# Patient Record
Sex: FEMALE | Race: WHITE | NOT HISPANIC OR LATINO | Employment: OTHER | URBAN - METROPOLITAN AREA
[De-identification: names, ages, dates, MRNs, and addresses within clinical notes are randomized per-mention and may not be internally consistent; named-entity substitution may affect disease eponyms.]

---

## 2019-05-31 ENCOUNTER — LAB VISIT (OUTPATIENT)
Dept: LAB | Facility: OTHER | Age: 34
End: 2019-05-31
Payer: MEDICARE

## 2019-05-31 ENCOUNTER — OFFICE VISIT (OUTPATIENT)
Dept: NEUROLOGY | Facility: CLINIC | Age: 34
End: 2019-05-31
Payer: MEDICARE

## 2019-05-31 VITALS
HEIGHT: 65 IN | SYSTOLIC BLOOD PRESSURE: 99 MMHG | BODY MASS INDEX: 20.39 KG/M2 | WEIGHT: 122.38 LBS | DIASTOLIC BLOOD PRESSURE: 52 MMHG | HEART RATE: 52 BPM

## 2019-05-31 DIAGNOSIS — G40.109 TEMPORAL LOBE EPILEPSY: ICD-10-CM

## 2019-05-31 DIAGNOSIS — G40.109 TEMPORAL LOBE EPILEPSY: Primary | ICD-10-CM

## 2019-05-31 LAB
ALBUMIN SERPL BCP-MCNC: 4.4 G/DL (ref 3.5–5.2)
ALP SERPL-CCNC: 64 U/L (ref 55–135)
ALT SERPL W/O P-5'-P-CCNC: 14 U/L (ref 10–44)
ANION GAP SERPL CALC-SCNC: 7 MMOL/L (ref 8–16)
AST SERPL-CCNC: 13 U/L (ref 10–40)
BASOPHILS # BLD AUTO: 0.01 K/UL (ref 0–0.2)
BASOPHILS NFR BLD: 0.2 % (ref 0–1.9)
BILIRUB SERPL-MCNC: 0.5 MG/DL (ref 0.1–1)
BUN SERPL-MCNC: 6 MG/DL (ref 6–20)
CALCIUM SERPL-MCNC: 9 MG/DL (ref 8.7–10.5)
CHLORIDE SERPL-SCNC: 106 MMOL/L (ref 95–110)
CO2 SERPL-SCNC: 28 MMOL/L (ref 23–29)
CREAT SERPL-MCNC: 0.8 MG/DL (ref 0.5–1.4)
DIFFERENTIAL METHOD: ABNORMAL
EOSINOPHIL # BLD AUTO: 0 K/UL (ref 0–0.5)
EOSINOPHIL NFR BLD: 0.7 % (ref 0–8)
ERYTHROCYTE [DISTWIDTH] IN BLOOD BY AUTOMATED COUNT: 12.8 % (ref 11.5–14.5)
EST. GFR  (AFRICAN AMERICAN): >60 ML/MIN/1.73 M^2
EST. GFR  (NON AFRICAN AMERICAN): >60 ML/MIN/1.73 M^2
GLUCOSE SERPL-MCNC: 92 MG/DL (ref 70–110)
HCT VFR BLD AUTO: 39.7 % (ref 37–48.5)
HGB BLD-MCNC: 13.6 G/DL (ref 12–16)
LYMPHOCYTES # BLD AUTO: 1.6 K/UL (ref 1–4.8)
LYMPHOCYTES NFR BLD: 27.9 % (ref 18–48)
MCH RBC QN AUTO: 32.2 PG (ref 27–31)
MCHC RBC AUTO-ENTMCNC: 34.3 G/DL (ref 32–36)
MCV RBC AUTO: 94 FL (ref 82–98)
MONOCYTES # BLD AUTO: 0.3 K/UL (ref 0.3–1)
MONOCYTES NFR BLD: 5.4 % (ref 4–15)
NEUTROPHILS # BLD AUTO: 3.8 K/UL (ref 1.8–7.7)
NEUTROPHILS NFR BLD: 65.6 % (ref 38–73)
PLATELET # BLD AUTO: 227 K/UL (ref 150–350)
PMV BLD AUTO: 11.3 FL (ref 9.2–12.9)
POTASSIUM SERPL-SCNC: 3 MMOL/L (ref 3.5–5.1)
PROT SERPL-MCNC: 6.7 G/DL (ref 6–8.4)
RBC # BLD AUTO: 4.23 M/UL (ref 4–5.4)
SODIUM SERPL-SCNC: 141 MMOL/L (ref 136–145)
WBC # BLD AUTO: 5.78 K/UL (ref 3.9–12.7)

## 2019-05-31 PROCEDURE — 99999 PR PBB SHADOW E&M-NEW PATIENT-LVL III: ICD-10-PCS | Mod: PBBFAC,,, | Performed by: PSYCHIATRY & NEUROLOGY

## 2019-05-31 PROCEDURE — 36415 COLL VENOUS BLD VENIPUNCTURE: CPT

## 2019-05-31 PROCEDURE — 3008F BODY MASS INDEX DOCD: CPT | Mod: CPTII,S$GLB,, | Performed by: PSYCHIATRY & NEUROLOGY

## 2019-05-31 PROCEDURE — 85025 COMPLETE CBC W/AUTO DIFF WBC: CPT

## 2019-05-31 PROCEDURE — 3008F PR BODY MASS INDEX (BMI) DOCUMENTED: ICD-10-PCS | Mod: CPTII,S$GLB,, | Performed by: PSYCHIATRY & NEUROLOGY

## 2019-05-31 PROCEDURE — 99205 PR OFFICE/OUTPT VISIT, NEW, LEVL V, 60-74 MIN: ICD-10-PCS | Mod: S$GLB,,, | Performed by: PSYCHIATRY & NEUROLOGY

## 2019-05-31 PROCEDURE — 99999 PR PBB SHADOW E&M-NEW PATIENT-LVL III: CPT | Mod: PBBFAC,,, | Performed by: PSYCHIATRY & NEUROLOGY

## 2019-05-31 PROCEDURE — 99205 OFFICE O/P NEW HI 60 MIN: CPT | Mod: S$GLB,,, | Performed by: PSYCHIATRY & NEUROLOGY

## 2019-05-31 PROCEDURE — 80053 COMPREHEN METABOLIC PANEL: CPT

## 2019-05-31 PROCEDURE — 80177 DRUG SCRN QUAN LEVETIRACETAM: CPT

## 2019-05-31 RX ORDER — LEVETIRACETAM 250 MG/1
500 TABLET ORAL 2 TIMES DAILY
COMMUNITY
End: 2019-05-31 | Stop reason: DRUGHIGH

## 2019-05-31 RX ORDER — LEVETIRACETAM 750 MG/1
1500 TABLET ORAL 2 TIMES DAILY
Qty: 360 TABLET | Refills: 3 | Status: ON HOLD | OUTPATIENT
Start: 2019-05-31 | End: 2019-07-06 | Stop reason: HOSPADM

## 2019-05-31 NOTE — PATIENT INSTRUCTIONS
You came to epilepsy clinic because you are interested in options to treat your seizures including surgery. You have already had an MRI. Please help us get the disk with images so we can look at the images ourselves. You have had an ambultory EEG done recently. The next step is admission into our Epilepsy Monitoring Unit to capture seizures. This will involved stopping your medications. There risks including injury from seizures, seizures that are hard to control, and injury from falls etc. For the mean time, I would like you to take Keppra 1500mg BID to protect from breakthrough seizures while we wait for this admission. No standing water by yourself, no heights, no power tools, bike helmets. Multiple seizures in a row and do not return back to baseline, 911 needs to be called. We will call you to set the EMU admission.       - Copy of the MRI disk   - AMbultory already done

## 2019-05-31 NOTE — LETTER
Roya 3, 2019      Yrn Davey MD  [address] [facility] [place] [address]           [facility] [address]  [place] [address]  Phone: [phone]  Fax: [phone]          Patient: Mary Palma   MR Number: [id number]   YOB: 1985   Date of Visit: 5/31/2019       Dear Dr. Yrn Davey:    Thank you for referring Mary Palma to me for evaluation. Attached you will find relevant portions of my assessment and plan of care.    If you have questions, please do not hesitate to call me. I look forward to following Mary Palma along with you.    Sincerely,    Charmaine Hennessy MD PhD    Enclosure  CC:  No Recipients    If you would like to receive this communication electronically, please contact [email or web address] or [phone] to request more information on [email or web address] Link access.    For providers and/or their staff who would like to refer a patient to [facility], please contact us through our one-stop-shop provider referral line, [facility], at [phone].    If you feel you have received this communication in error or would no longer like to receive these types of communications, please e-mail [email or web address]
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
## 2019-06-03 LAB — LEVETIRACETAM SERPL-MCNC: 24.1 UG/ML (ref 3–60)

## 2019-06-06 ENCOUNTER — TELEPHONE (OUTPATIENT)
Dept: NEUROLOGY | Facility: CLINIC | Age: 34
End: 2019-06-06

## 2019-06-11 ENCOUNTER — TELEPHONE (OUTPATIENT)
Dept: NEUROLOGY | Facility: CLINIC | Age: 34
End: 2019-06-11

## 2019-06-26 ENCOUNTER — TELEPHONE (OUTPATIENT)
Dept: NEUROLOGY | Facility: CLINIC | Age: 34
End: 2019-06-26

## 2019-07-02 ENCOUNTER — PATIENT MESSAGE (OUTPATIENT)
Dept: NEUROLOGY | Facility: CLINIC | Age: 34
End: 2019-07-02

## 2019-07-03 ENCOUNTER — HOSPITAL ENCOUNTER (INPATIENT)
Facility: HOSPITAL | Age: 34
LOS: 3 days | Discharge: HOME | DRG: 101 | End: 2019-07-06
Attending: PSYCHIATRY & NEUROLOGY | Admitting: PSYCHIATRY & NEUROLOGY
Payer: MEDICARE

## 2019-07-03 DIAGNOSIS — G40.219 COMPLEX PARTIAL EPILEPSY WITH GENERALIZATION AND WITH INTRACTABLE EPILEPSY: ICD-10-CM

## 2019-07-03 DIAGNOSIS — G40.109 TEMPORAL LOBE EPILEPSY: ICD-10-CM

## 2019-07-03 DIAGNOSIS — R56.9 SEIZURES: Primary | ICD-10-CM

## 2019-07-03 LAB
ALBUMIN SERPL BCP-MCNC: 4.1 G/DL (ref 3.5–5.2)
ALP SERPL-CCNC: 65 U/L (ref 55–135)
ALT SERPL W/O P-5'-P-CCNC: 11 U/L (ref 10–44)
ANION GAP SERPL CALC-SCNC: 7 MMOL/L (ref 8–16)
AST SERPL-CCNC: 12 U/L (ref 10–40)
BASOPHILS # BLD AUTO: 0.03 K/UL (ref 0–0.2)
BASOPHILS NFR BLD: 0.5 % (ref 0–1.9)
BILIRUB SERPL-MCNC: 0.6 MG/DL (ref 0.1–1)
BUN SERPL-MCNC: 6 MG/DL (ref 6–20)
CALCIUM SERPL-MCNC: 9 MG/DL (ref 8.7–10.5)
CHLORIDE SERPL-SCNC: 105 MMOL/L (ref 95–110)
CO2 SERPL-SCNC: 27 MMOL/L (ref 23–29)
CREAT SERPL-MCNC: 0.8 MG/DL (ref 0.5–1.4)
DIFFERENTIAL METHOD: ABNORMAL
EOSINOPHIL # BLD AUTO: 0.1 K/UL (ref 0–0.5)
EOSINOPHIL NFR BLD: 1.7 % (ref 0–8)
ERYTHROCYTE [DISTWIDTH] IN BLOOD BY AUTOMATED COUNT: 12.2 % (ref 11.5–14.5)
EST. GFR  (AFRICAN AMERICAN): >60 ML/MIN/1.73 M^2
EST. GFR  (NON AFRICAN AMERICAN): >60 ML/MIN/1.73 M^2
GLUCOSE SERPL-MCNC: 142 MG/DL (ref 70–110)
HCT VFR BLD AUTO: 38.8 % (ref 37–48.5)
HGB BLD-MCNC: 13.5 G/DL (ref 12–16)
IMM GRANULOCYTES # BLD AUTO: 0.02 K/UL (ref 0–0.04)
IMM GRANULOCYTES NFR BLD AUTO: 0.3 % (ref 0–0.5)
LYMPHOCYTES # BLD AUTO: 2.7 K/UL (ref 1–4.8)
LYMPHOCYTES NFR BLD: 42.8 % (ref 18–48)
MCH RBC QN AUTO: 32.6 PG (ref 27–31)
MCHC RBC AUTO-ENTMCNC: 34.8 G/DL (ref 32–36)
MCV RBC AUTO: 94 FL (ref 82–98)
MONOCYTES # BLD AUTO: 0.4 K/UL (ref 0.3–1)
MONOCYTES NFR BLD: 6.6 % (ref 4–15)
NEUTROPHILS # BLD AUTO: 3.1 K/UL (ref 1.8–7.7)
NEUTROPHILS NFR BLD: 48.1 % (ref 38–73)
NRBC BLD-RTO: 0 /100 WBC
PLATELET # BLD AUTO: 171 K/UL (ref 150–350)
PMV BLD AUTO: 10.9 FL (ref 9.2–12.9)
POTASSIUM SERPL-SCNC: 3.1 MMOL/L (ref 3.5–5.1)
PROT SERPL-MCNC: 6.4 G/DL (ref 6–8.4)
RBC # BLD AUTO: 4.14 M/UL (ref 4–5.4)
SODIUM SERPL-SCNC: 139 MMOL/L (ref 136–145)
WBC # BLD AUTO: 6.36 K/UL (ref 3.9–12.7)

## 2019-07-03 PROCEDURE — 80177 DRUG SCRN QUAN LEVETIRACETAM: CPT

## 2019-07-03 PROCEDURE — 95951 HC EEG MONITORING/VIDEO RECORD: CPT

## 2019-07-03 PROCEDURE — 80299 QUANTITATIVE ASSAY DRUG: CPT

## 2019-07-03 PROCEDURE — 99223 1ST HOSP IP/OBS HIGH 75: CPT | Mod: AI,,, | Performed by: PSYCHIATRY & NEUROLOGY

## 2019-07-03 PROCEDURE — 95951 PR EEG MONITORING/VIDEORECORD: ICD-10-PCS | Mod: 26,,, | Performed by: PSYCHIATRY & NEUROLOGY

## 2019-07-03 PROCEDURE — 80053 COMPREHEN METABOLIC PANEL: CPT

## 2019-07-03 PROCEDURE — 95951 PR EEG MONITORING/VIDEORECORD: CPT | Mod: 26,,, | Performed by: PSYCHIATRY & NEUROLOGY

## 2019-07-03 PROCEDURE — 63600175 PHARM REV CODE 636 W HCPCS: Performed by: PSYCHIATRY & NEUROLOGY

## 2019-07-03 PROCEDURE — 99223 PR INITIAL HOSPITAL CARE,LEVL III: ICD-10-PCS | Mod: AI,,, | Performed by: PSYCHIATRY & NEUROLOGY

## 2019-07-03 PROCEDURE — 85025 COMPLETE CBC W/AUTO DIFF WBC: CPT

## 2019-07-03 PROCEDURE — 25000003 PHARM REV CODE 250: Performed by: PSYCHIATRY & NEUROLOGY

## 2019-07-03 PROCEDURE — 20600001 HC STEP DOWN PRIVATE ROOM

## 2019-07-03 RX ORDER — LEVETIRACETAM 750 MG/1
1500 TABLET ORAL 2 TIMES DAILY
Status: DISCONTINUED | OUTPATIENT
Start: 2019-07-03 | End: 2019-07-05

## 2019-07-03 RX ORDER — DOCUSATE SODIUM 100 MG/1
100 CAPSULE, LIQUID FILLED ORAL 2 TIMES DAILY
Status: DISCONTINUED | OUTPATIENT
Start: 2019-07-03 | End: 2019-07-06 | Stop reason: HOSPADM

## 2019-07-03 RX ORDER — ACETAMINOPHEN 325 MG/1
650 TABLET ORAL EVERY 4 HOURS PRN
Status: DISCONTINUED | OUTPATIENT
Start: 2019-07-03 | End: 2019-07-06 | Stop reason: HOSPADM

## 2019-07-03 RX ORDER — ONDANSETRON 2 MG/ML
4 INJECTION INTRAMUSCULAR; INTRAVENOUS EVERY 8 HOURS PRN
Status: DISCONTINUED | OUTPATIENT
Start: 2019-07-03 | End: 2019-07-06 | Stop reason: HOSPADM

## 2019-07-03 RX ORDER — LEVETIRACETAM 750 MG/1
1500 TABLET ORAL
Status: ON HOLD | COMMUNITY
End: 2019-07-03

## 2019-07-03 RX ORDER — SODIUM CHLORIDE 0.9 % (FLUSH) 0.9 %
10 SYRINGE (ML) INJECTION
Status: DISCONTINUED | OUTPATIENT
Start: 2019-07-03 | End: 2019-07-06 | Stop reason: HOSPADM

## 2019-07-03 RX ORDER — LORAZEPAM 2 MG/ML
2 INJECTION INTRAMUSCULAR DAILY PRN
Status: DISCONTINUED | OUTPATIENT
Start: 2019-07-03 | End: 2019-07-06 | Stop reason: HOSPADM

## 2019-07-03 RX ADMIN — LORAZEPAM 2 MG: 2 INJECTION, SOLUTION INTRAMUSCULAR; INTRAVENOUS at 06:07

## 2019-07-03 RX ADMIN — LEVETIRACETAM 1500 MG: 750 TABLET ORAL at 10:07
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
## 2019-07-03 NOTE — ASSESSMENT & PLAN NOTE
34F w/ PMH reported focal onset epilepsy with multiple event types in house for event characterization    The patient's AED regimen is being held/reduced/changed and/or the patient is undergoing provocative interventions in an effort to capture clinical events, making inpatient admission medically necessary for patient safety.  This patient is felt to be high risk due to the likelihood of seizures during this admission.       - holding home keppra  - ativan PRN prolonged seizure or seizure cluster  -- start vEEG  -- seizure precautions  -- may require neuropsych involvement
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
## 2019-07-03 NOTE — PROCEDURES
EMU Monitoring  Date/Time: 7/3/2019 2:14 PM  Performed by: Power Collier MD  Authorized by: Power Collier MD         VIDEO ELECTROENCEPHALOGRAM  REPORT    DATE OF SERVICE: 7/3/19-7/6/19  EEG NUMBER: -1,2,3,4  REQUESTED BY:  Gerhard  LOCATION OF SERVICE:  [facility] [place]    METHODOLOGY   Electroencephalographic (EEG) recording is with electrodes placed according to the International 10-20 placement system.  Thirty two (32) channels of digital signal (sampling rate of 512/sec) including T1 and T2 was simultaneously recorded from the scalp and may include  EKG, EMG, and/or eye monitors.  Recording band pass was 0.1 to 512 hz.  Digital video recording of the patient is simultaneously recorded with the EEG.  The patient is instructed report clinical symptoms which may occur during the recording session.  EEG and video recording is stored and archived in digital format.  Activation procedures which include photic stimulation, hyperventilation and instructing patients to perform simple task are done in selected patients.   The EEG is displayed on a monitor screen and can be reviewed using different montages.  Computer assisted analysis is employed to detect spike and electrographic seizure activity.   The entire record is submitted for computer analysis.  The entire recording is visually reviewed and the times identified by computer analysis as being spikes or seizures are reviewed again.  Compresses spectral analysis (CSA) is also performed on the activity recorded from each individual channel.  This is displayed as a power display of frequencies from 0 to 30 Hz over time.   The CSA is reviewed looking for asymmetries in power between homologous areas of the scalp and then compared with the original EEG recording.     Vimodi software was also utilized in the review of this study.  This software suite analyzes the EEG recording in multiple domains.  Coherence and rhythmicity is computed to identify EEG sections which may  contain organized seizures.  Each channel undergoes analysis to detect presence of spike and sharp waves which have special and morphological characteristic of epileptic activity.  The routine EEG recording is converted from spacial into frequency domain.  This is then displayed comparing homologous areas to identify areas of significant asymmetry.  Algorithm to identify non-cortically generated artifact is used to separate eye movement, EMG and other artifact from the EEG.      RECORDING TIMES  Start on 7/3/19 at 15:01:01   Stop on 7/6/19 at 11:41:29  A total of 68 hrs and 40 min of EEG recording was obtained.    ELECTROENCEPHALOGRAM:    INTERICTAL:   The record shows a good  organization at rest, consisting of a  10.5 Hz posterior dominant rhythm with good  reactivity. There is mild bilateral beta activity. There are abundant ultimately becoming frequent, bilateral, independent sharp wave and spike and wave interictal epileptiform discharges seen maximally at F8/T2, F7/T1 and T3. There is intermittent theta/delta slowing seen     Drowsiness is characterized by attenuation of the background, vertex waves, and bilateral theta slowing. Stage II sleep is characterized by slowing, vertex waves, and symmetric sleep spindles. Slow wave and REM sleep are recorded.    Provocative maneuvers including hyperventilation and photic stimulation were performed.     EKG recording shows a sinus rhythm.      ICTAL:   Seizure 1: At 15:15:19 until 15:16:42 on 7/3/19 . Clinically, the patient exhibits hypermotor activity rapidly changing position in bed. She does not follow commands and pulls away from the EEG tech and pulls on the wires before ultimately attending to her father. Electrographically, there is onset of rhythmic, 5 Hz theta activity over the left frontotemporal region with rapid spread bilaterally to the right before rapidly being obscured by electrode and myogenic artifact.    Seizure 2: At 16:28:00 until 16:29:19 on  7/3/19. Clinically, the patient is lying in bed when she opens her eyes and fumbles her right hand before exhibiting hypermotor activity changing position in bed and then fumbling her left hand prior to electrographic end of the seizure. Electrographically, seizure is similar to seizure 1 with onset of 5 Hz sharply contoured rhythmic theta activity over the left frontotemproal region maximal at T1/T3 again with spread to the right and again obscured by electrode and myogenic artifact.     Seizure 3: At 17:34:37 until 17:36:00 on 7/3/19 Clinically, the seizure is similar to seizures 1 and 2 with hypermotor activity, unresponsiveness, fumbling of the right hand, and the patient pulling on the bedsheets. This time, the patient exhibits subtle side to side head shaking later followed by subtle up and down head shaking. Electrographically, seizure is similar to Seizures 1 and 2.     Seizure 4: At 20:14:42 until 20:51:31 on 7/3/19, the patient is asleep in bed. Clinically, the patient exibits no behavior change until 20:15:06, when she shifts position in bed before briefly exhibiting hypermotor activity and moving her head side to side. There is very clear onset of high amplitude, sharply contoured, rhythmic, 5 Hz activity over the left frontotemporal region maximal at T1/T3 with spread to the right hemisphere. At 20:14:49, there is onset of independent high amplitude, rhythmic, sharply contoured, 6 Hz activity maximal over the right hemisphere that is rapidly obscured by artifact.     Seizure 5: At 21:11:50 until 21:12:38 on 7/3/19, the patient is asleep on her left side in bed before raising her right leg and exhibiting hypermotor activity at 21:12:18 until clinical end of the seizure. Electrographically, seizure is similar to seizure 5 with onset of rhythmic 5 Hz activity over the left hemisphere followed by rhythmic 6  Hz activity over the right hemisphere and evolution similar to previous seizures.      Seizure 6: At  21:38:55 until 21:39:51 on 7/3/19, the patient ss asleep in bed lying on her left side. She exhibits a slight positional change at 21:39:19 followed by hypermotor activity at 21:39:29 with subsequent head nodding and nonsensical speech at the end of the seizure. The seizure begins with sharply contoured, rhythmic 6 Hz activity, this time clearly beginning over the right hemisphere maximal at T2/T4. This is followed within 1 second by 4-5 Hz, sharply contoured rhythmic activity over the left temporal region with seizure rapidly obscured by myogenic and electrode artifact.    Seizure 7: At 22:46:55 until 22:47:23 on 7/3/19. Seizure is electrographically and clinically similar to seizure 4.     Seizure 8: At 00:13:28 until 00:14:19 on 7/4/19. Seizure is electrographically and clinically similar to seizure 4.     Seizure 9: At 01:47:41 until 01:48:56 on 7/4/19. Seizure is electrographically and clinically similar to seizure 4.     Seizure 10: At 02:19:21 until 02:21:10 on 7/4/19. Seizure is electrographically and clinically similar to seizure 4.     Seizure 11: At 03:41:05 until 03:42:05 on 7/4/19. Seizure is electrographically and clinically similar to seizure 4.     Seizure 12: At 08:52:52 until 09:10:06 on 7/4/19. Seizure is electrographically and clinically similar to seizure 4.     Seizure 13: At 06:39:06 until 06:39:31 on 7/6/19. Seizure is electrographically and clinically similar to seizure 4.    IMPRESSION:  Markedly abnormal study consistent with bitemporal, independent focal onset epilepsy. Patient has abundant bilateral, independent interictal epileptiform discharges. A total of 13 electroclinical seizures are captured, 12 with left hemispheric onset and 1 with right hemispheric onset.     Power Collier MD

## 2019-07-03 NOTE — SUBJECTIVE & OBJECTIVE
Past Medical History:   Diagnosis Date    Anxiety     Depression     PTSD (post-traumatic stress disorder)        Past Surgical History:   Procedure Laterality Date    HYSTERECTOMY         Review of patient's allergies indicates:  No Known Allergies    No current facility-administered medications on file prior to encounter.      Current Outpatient Medications on File Prior to Encounter   Medication Sig    [DISCONTINUED] eslicarbazepine (APTIOM) 800 mg Tab Take 1,600 mg by mouth.    levETIRAcetam (KEPPRA) 750 MG Tab Take 2 tablets (1,500 mg total) by mouth 2 (two) times daily.    [DISCONTINUED] levETIRAcetam (KEPPRA) 750 MG Tab Take 1,500 mg by mouth.     Continuous Infusions:    Family History     None        Tobacco Use    Smoking status: Current Every Day Smoker   Substance and Sexual Activity    Alcohol use: Yes     Frequency: Monthly or less    Drug use: Yes     Types: Marijuana    Sexual activity: Not on file     Review of Systems  Objective:     Vital Signs (Most Recent):  Temp: 96.9 °F (36.1 °C) (07/03/19 1335)  Pulse: (!) 54 (07/03/19 1335)  Resp: 18 (07/03/19 1335)  BP: (!) 95/50 (07/03/19 1335)  SpO2: 98 % (07/03/19 1335) Vital Signs (24h Range):  Temp:  [96.9 °F (36.1 °C)] 96.9 °F (36.1 °C)  Pulse:  [54] 54  Resp:  [18] 18  SpO2:  [98 %] 98 %  BP: (95)/(50) 95/50        There is no height or weight on file to calculate BMI.    Physical Exam  Vitals:    07/03/19 1335   BP: (!) 95/50   Patient Position: Sitting   Pulse: (!) 54   Resp: 18   Temp: 96.9 °F (36.1 °C)   TempSrc: Oral   SpO2: 98%       General: NAD, well nourished   Eyes: no tearing, discharge, no erythema   ENT: moist mucous membranes of the oral cavity, nares patent    Neck: Supple, full range of motion  Cardiovascular: Warm and well perfused, pulses equal and symmetrical  Lungs: Normal work of breathing, normal chest wall excursions  Skin: No rash, lesions, or breakdown on exposed skin  Psychiatry: Mood and affect are appropriate    Abdomen: soft, non tender, non distended  Extremeties: No cyanosis, clubbing or edema.    Neurological   MENTAL STATUS: Alert and oriented to person, place, and time. Attention and concentration within normal limits. Speech without dysarthria. Recent and remote memory within normal limits   CRANIAL NERVES: Visual fields intact. PERRL. EOMI. Facial sensation intact. Face symmetrical. Hearing grossly intact. Full shoulder shrug bilaterally. Tongue protrudes midline   SENSORY: Sensation is intact to light touch throughout.    MOTOR: Normal bulk and tone.  5/5 deltoid, biceps, triceps, interosseous, hand  bilaterally. 5/5 iliopsoas, knee extension/flexion, foot dorsi/plantarflexion bilaterally.    REFLEXES: Symmetric and 2+ throughout. CEREBELLAR/COORDINATION/GAIT: Gait steady with normal arm swing and stride length. Finger to nose intact. Normal rapid alternating movements.     Significant Labs: All pertinent lab results from the past 24 hours have been reviewed.    Significant Studies: CT: I have reviewed all pertinent results/findings within the past 24 hours

## 2019-07-04 PROCEDURE — 95951 PR EEG MONITORING/VIDEORECORD: ICD-10-PCS | Mod: 26,,, | Performed by: PSYCHIATRY & NEUROLOGY

## 2019-07-04 PROCEDURE — 95951 PR EEG MONITORING/VIDEORECORD: CPT | Mod: 26,,, | Performed by: PSYCHIATRY & NEUROLOGY

## 2019-07-04 PROCEDURE — 95951 HC EEG MONITORING/VIDEO RECORD: CPT

## 2019-07-04 PROCEDURE — 25000003 PHARM REV CODE 250: Performed by: PSYCHIATRY & NEUROLOGY

## 2019-07-04 PROCEDURE — 99233 PR SUBSEQUENT HOSPITAL CARE,LEVL III: ICD-10-PCS | Mod: ,,, | Performed by: PSYCHIATRY & NEUROLOGY

## 2019-07-04 PROCEDURE — 20600001 HC STEP DOWN PRIVATE ROOM

## 2019-07-04 PROCEDURE — 99233 SBSQ HOSP IP/OBS HIGH 50: CPT | Mod: ,,, | Performed by: PSYCHIATRY & NEUROLOGY

## 2019-07-04 RX ORDER — LAMOTRIGINE 25 MG/1
25 TABLET ORAL DAILY
Status: DISCONTINUED | OUTPATIENT
Start: 2019-07-04 | End: 2019-07-06 | Stop reason: HOSPADM

## 2019-07-04 RX ORDER — VALPROIC ACID 250 MG/1
250 CAPSULE, LIQUID FILLED ORAL 2 TIMES DAILY
Status: DISCONTINUED | OUTPATIENT
Start: 2019-07-04 | End: 2019-07-06 | Stop reason: HOSPADM

## 2019-07-04 RX ADMIN — LEVETIRACETAM 1500 MG: 750 TABLET ORAL at 10:07

## 2019-07-04 RX ADMIN — LAMOTRIGINE 25 MG: 25 TABLET ORAL at 01:07

## 2019-07-04 RX ADMIN — DEXTROSE 1000 MG: 50 INJECTION, SOLUTION INTRAVENOUS at 01:07

## 2019-07-04 RX ADMIN — VALPROIC ACID 250 MG: 250 CAPSULE, LIQUID FILLED ORAL at 10:07

## 2019-07-04 NOTE — NURSING
Patient with one event early am.  Physician present at bedside.  VSSm patient returned to baseline approx 30 minutes past.   Medication reviewed and new orders received.   Will cont   POC and monitor seizures.

## 2019-07-04 NOTE — HOSPITAL COURSE
7/3-7/4: 10 sz w/ L hemipheric onset, 1 with R hemispheric onset, bitemporal independent interictals  7/4 -7/5: 2 sz w/ L hemispheric onset  7/5-7/6: 1 sz 1/w/ L hemispheric onset     Patient with significant reduction in seizure burden and interictals discharged home to follow up with Dr. Hennessy. Tolerating new regimen of depakote transitioning to lamictal with onfi well. Will provide ativan taper following discharge.

## 2019-07-04 NOTE — ASSESSMENT & PLAN NOTE
34F w/ PMH reported focal onset epilepsy with multiple event types in house for event characterization    The patient's AED regimen is being held/reduced/changed and/or the patient is undergoing provocative interventions in an effort to capture clinical events, making inpatient admission medically necessary for patient safety.  This patient is felt to be high risk due to the likelihood of seizures during this admission.       -- dc'd keppra, not providing adequate seizure control at max doses  -- depakote 250 mg BID with lamictal 25 mg daily, will move forward with combination therapy while lamictal being increased  -- onfi 10 mg daily as adjuvant  -- ativan taper at discharge  - ativan PRN prolonged seizure or seizure cluster  -- discontinue vEEG  -- seizure precautions  -- may require neuropsych involvement as outpatient

## 2019-07-04 NOTE — PROGRESS NOTES
[facility]  Neurology-Epilepsy  Progress Note    Patient Name: Mary Haywood  MRN: [id number]  Admission Date: 7/3/2019  Hospital Length of Stay: 1 days  Code Status: Full Code   Attending Provider: Power Collier MD  Primary Care Physician: Primary Doctor No   Principal Problem:Complex partial epilepsy with generalization and with intractable epilepsy    Subjective:     Interval History: Multiple electroclinical seizures overnight. Many with hypermotor activity, one with postictal psychosis. One seizure this AM while in room with examiner. Able to visually attend to father but unable to follow commands during hypermotor event.     Hospital Course: 7/3-7/4: Held home keppra on admit. 10 sz w/ L hemipheric onset, 1 with R hemispheric onset, bitemporal independent interictals      Current Facility-Administered Medications   Medication Dose Route Frequency Provider Last Rate Last Dose    acetaminophen tablet 650 mg  650 mg Oral Q4H PRN Power Collier MD        docusate sodium capsule 100 mg  100 mg Oral BID Power Collier MD        lamoTRIgine tablet 25 mg  25 mg Oral Daily Power Collier MD        levETIRAcetam tablet 1,500 mg  1,500 mg Oral BID Power Collier MD   1,500 mg at 07/04/19 1003    lorazepam injection 2 mg  2 mg Intravenous Daily PRN Power Collier MD   2 mg at 07/03/19 1803    ondansetron injection 4 mg  4 mg Intravenous Q8H PRN Power Collier MD        sodium chloride 0.9% flush 10 mL  10 mL Intravenous PRN Power Collier MD        valproate (DEPACON) 1,000 mg in dextrose 5 % 100 mL IVPB  1,000 mg Intravenous Once Power Collier MD        Followed by    valproic acid capsule 250 mg  250 mg Oral BID Power Collier MD         Continuous Infusions:    Review of Systems  Objective:     Vital Signs (Most Recent):  Temp: 97.4 °F (36.3 °C) (07/04/19 0807)  Pulse: 99 (07/04/19 0807)  Resp: 18 (07/04/19 0807)  BP: 102/68 (07/04/19 0807)  SpO2: 98 % (07/04/19 0807) Vital  Signs (24h Range):  Temp:  [96.9 °F (36.1 °C)-99.4 °F (37.4 °C)] 97.4 °F (36.3 °C)  Pulse:  [48-99] 99  Resp:  [17-18] 18  SpO2:  [96 %-98 %] 98 %  BP: ()/(50-68) 102/68     Weight: 56.7 kg (125 lb)  Body mass index is 20.8 kg/m².    Physical Exam  Vitals:    07/04/19 0300 07/04/19 0412 07/04/19 0723 07/04/19 0807   BP:  101/63  102/68   BP Location:  Right arm     Patient Position:  Lying     Pulse: (!) 51 (!) 51 (!) 51 99   Resp:  18  18   Temp:  99.4 °F (37.4 °C)  97.4 °F (36.3 °C)   TempSrc:  Oral     SpO2:  97%  98%   Weight:       Height:           General: NAD, well nourished   Eyes: no tearing, discharge, no erythema   ENT: moist mucous membranes of the oral cavity, nares patent    Neck: Supple, full range of motion  Cardiovascular: Warm and well perfused, pulses equal and symmetrical  Lungs: Normal work of breathing, normal chest wall excursions  Skin: No rash, lesions, or breakdown on exposed skin  Psychiatry: Mood and affect are appropriate   Abdomen: soft, non tender, non distended  Extremeties: No cyanosis, clubbing or edema.    Neurological   MENTAL STATUS: Alert and oriented to person, place, and time. Attention and concentration within normal limits. Speech without dysarthria, able to name and repeat without difficulty. Recent and remote memory within normal limits   CRANIAL NERVES: Visual fields intact. PERRL. EOMI. Facial sensation intact. Face symmetrical. Hearing grossly intact. Full shoulder shrug bilaterally. Tongue protrudes midline   SENSORY: Sensation is intact to light touch throughout.    MOTOR: Normal bulk and tone.  5/5 deltoid, biceps, triceps, interosseous, hand  bilaterally. 5/5 iliopsoas, knee extension/flexion, foot dorsi/plantarflexion bilaterally.    REFLEXES: Symmetric and 2+ throughout.  CEREBELLAR/COORDINATION/GAIT: Gait steady with normal arm swing and stride length.  Heel to shin intact.. Normal rapid alternating movements.        Significant Labs: All pertinent lab  results from the past 24 hours have been reviewed.    Significant Studies: I have reviewed all pertinent imaging results/findings within the past 24 hours.    Assessment and Plan:   * Complex partial epilepsy with generalization and with intractable epilepsy  34F w/ PMH reported focal onset epilepsy with multiple event types in house for event characterization    The patient's AED regimen is being held/reduced/changed and/or the patient is undergoing provocative interventions in an effort to capture clinical events, making inpatient admission medically necessary for patient safety.  This patient is felt to be high risk due to the likelihood of seizures during this admission.       - resume home keppra, likely dc once therapeutic on new AEDs  -- loaded depakote, start depakote 250 mg BID with lamictal 25 mg daily, will move forward with combination therapy while lamictal being increased  - ativan PRN prolonged seizure or seizure cluster  -- continue vEEG  -- seizure precautions  -- may require neuropsych involvement      Active Diagnoses:    Diagnosis Date Noted POA    PRINCIPAL PROBLEM:  Complex partial epilepsy with generalization and with intractable epilepsy [G40.219] 07/03/2019 Yes    Seizures [R56.9]  Unknown      Problems Resolved During this Admission:       VTE Risk Mitigation (From admission, onward)        Ordered     IP VTE LOW RISK PATIENT  Once      07/03/19 1247     Place sequential compression device  Until discontinued      07/03/19 1247          Power Collier MD  Neurology-Epilepsy  [facility]

## 2019-07-05 ENCOUNTER — PATIENT MESSAGE (OUTPATIENT)
Dept: NEUROLOGY | Facility: CLINIC | Age: 34
End: 2019-07-05

## 2019-07-05 ENCOUNTER — TELEPHONE (OUTPATIENT)
Dept: NEUROLOGY | Facility: CLINIC | Age: 34
End: 2019-07-05

## 2019-07-05 ENCOUNTER — TELEPHONE (OUTPATIENT)
Dept: PHARMACY | Facility: CLINIC | Age: 34
End: 2019-07-05

## 2019-07-05 PROBLEM — F41.9 ANXIETY AND DEPRESSION: Status: ACTIVE | Noted: 2019-07-05

## 2019-07-05 PROBLEM — F43.10 PTSD (POST-TRAUMATIC STRESS DISORDER): Status: ACTIVE | Noted: 2019-07-05

## 2019-07-05 PROBLEM — F32.A ANXIETY AND DEPRESSION: Status: ACTIVE | Noted: 2019-07-05

## 2019-07-05 PROCEDURE — 99233 PR SUBSEQUENT HOSPITAL CARE,LEVL III: ICD-10-PCS | Mod: ,,, | Performed by: PSYCHIATRY & NEUROLOGY

## 2019-07-05 PROCEDURE — 95951 PR EEG MONITORING/VIDEORECORD: ICD-10-PCS | Mod: 26,,, | Performed by: PSYCHIATRY & NEUROLOGY

## 2019-07-05 PROCEDURE — 99233 SBSQ HOSP IP/OBS HIGH 50: CPT | Mod: ,,, | Performed by: PSYCHIATRY & NEUROLOGY

## 2019-07-05 PROCEDURE — 20600001 HC STEP DOWN PRIVATE ROOM

## 2019-07-05 PROCEDURE — 95951 PR EEG MONITORING/VIDEORECORD: CPT | Mod: 26,,, | Performed by: PSYCHIATRY & NEUROLOGY

## 2019-07-05 PROCEDURE — 95951 HC EEG MONITORING/VIDEO RECORD: CPT

## 2019-07-05 PROCEDURE — 25000003 PHARM REV CODE 250: Performed by: PSYCHIATRY & NEUROLOGY

## 2019-07-05 RX ORDER — CLOBAZAM 10 MG/1
10 TABLET ORAL DAILY
Status: DISCONTINUED | OUTPATIENT
Start: 2019-07-05 | End: 2019-07-06 | Stop reason: HOSPADM

## 2019-07-05 RX ADMIN — VALPROIC ACID 250 MG: 250 CAPSULE, LIQUID FILLED ORAL at 10:07

## 2019-07-05 RX ADMIN — CLOBAZAM 10 MG: 10 TABLET ORAL at 10:07

## 2019-07-05 RX ADMIN — LEVETIRACETAM 1500 MG: 750 TABLET ORAL at 08:07

## 2019-07-05 RX ADMIN — LAMOTRIGINE 25 MG: 25 TABLET ORAL at 08:07

## 2019-07-05 RX ADMIN — VALPROIC ACID 250 MG: 250 CAPSULE, LIQUID FILLED ORAL at 08:07

## 2019-07-05 NOTE — SUBJECTIVE & OBJECTIVE
Past Medical History:   Diagnosis Date    Anxiety     Depression     PTSD (post-traumatic stress disorder)        Past Surgical History:   Procedure Laterality Date    HYSTERECTOMY         Review of patient's allergies indicates:  No Known Allergies    No current facility-administered medications on file prior to encounter.      Current Outpatient Medications on File Prior to Encounter   Medication Sig    levETIRAcetam (KEPPRA) 750 MG Tab Take 2 tablets (1,500 mg total) by mouth 2 (two) times daily.     Continuous Infusions:    Family History     None        Tobacco Use    Smoking status: Current Every Day Smoker   Substance and Sexual Activity    Alcohol use: Yes     Frequency: Monthly or less    Drug use: Yes     Types: Marijuana    Sexual activity: Not on file     Review of Systems  Objective:     Vital Signs (Most Recent):  Temp: 97.8 °F (36.6 °C) (07/05/19 1122)  Pulse: 61 (07/05/19 1122)  Resp: 14 (07/05/19 1122)  BP: (!) 95/56 (07/05/19 1122)  SpO2: 98 % (07/05/19 1122) Vital Signs (24h Range):  Temp:  [97.7 °F (36.5 °C)-98.5 °F (36.9 °C)] 97.8 °F (36.6 °C)  Pulse:  [49-68] 61  Resp:  [1-18] 14  SpO2:  [96 %-99 %] 98 %  BP: ()/(46-66) 95/56     Weight: 56.7 kg (125 lb)  Body mass index is 20.8 kg/m².    Physical Exam       Vitals:    07/05/19 0455 07/05/19 0501 07/05/19 0723 07/05/19 1122   BP: (!) 91/54 92/61 (!) 93/51 (!) 95/56   BP Location:   Right arm Right arm   Patient Position:   Lying Lying   Pulse:   (!) 58 61   Resp:   14 14   Temp:   97.8 °F (36.6 °C) 97.8 °F (36.6 °C)   TempSrc:   Oral Oral   SpO2:   97% 98%   Weight:       Height:           General: NAD, well nourished   Eyes: no tearing, discharge, no erythema   ENT: moist mucous membranes of the oral cavity, nares patent    Neck: Supple, full range of motion  Cardiovascular: Warm and well perfused, pulses equal and symmetrical  Lungs: Normal work of breathing, normal chest wall excursions  Skin: No rash, lesions, or breakdown on  exposed skin  Psychiatry: Mood and affect are appropriate   Abdomen: soft, non tender, non distended  Extremeties: No cyanosis, clubbing or edema.    Neurological   MENTAL STATUS: Alert and oriented to person, place, and time. Attention and concentration within normal limits. Speech without dysarthria Recent and remote memory within normal limits   CRANIAL NERVES: Visual fields intact. PERRL. EOMI. Facial sensation intact. Face symmetrical. Hearing grossly intact. Full shoulder shrug bilaterally. Tongue protrudes midline   SENSORY: Sensation is intact to light touch throughout.    MOTOR: Normal bulk and tone.   5/5 deltoid, biceps, triceps, interosseous, hand  bilaterally. 5/5 iliopsoas, knee extension/flexion, foot dorsi/plantarflexion bilaterally.    REFLEXES: Symmetric and 2+ throughout.   CEREBELLAR/COORDINATION/GAIT:Finger to nose intact. Normal rapid alternating movements.     Significant Labs: All pertinent lab results from the past 24 hours have been reviewed.    Significant Studies: I have reviewed all pertinent imaging results/findings within the past 24 hours.

## 2019-07-05 NOTE — PROGRESS NOTES
[facility]  Neurology-Epilepsy  Progress Note    Patient Name: Mary Haywood  MRN: [id number]  Admission Date: 7/3/2019  Hospital Length of Stay: 2 days  Code Status: Full Code   Attending Provider: Power Collier MD  Primary Care Physician: Primary Doctor No   Principal Problem:Complex partial epilepsy with generalization and with intractable epilepsy    Subjective:     Interval History: NAEON. Patient tolerated depakote load without event.     Hospital Course: 7/3-7/4: 10 sz w/ L hemipheric onset, 1 with R hemispheric onset, bitemporal independent interictals  7/4 -7/5: 2 sz w/ L hemispheric onset, loaded depakote with lamictal and onfi, with plan to transition off depakote once lamictal therapeutic         Current Facility-Administered Medications   Medication Dose Route Frequency Provider Last Rate Last Dose    acetaminophen tablet 650 mg  650 mg Oral Q4H PRN Power Collier MD        cloBAZam Tab 10 mg  10 mg Oral Daily Power Collier MD   10 mg at 07/05/19 1059    docusate sodium capsule 100 mg  100 mg Oral BID Power Collier MD        lamoTRIgine tablet 25 mg  25 mg Oral Daily Power Collier MD   25 mg at 07/05/19 0835    lorazepam injection 2 mg  2 mg Intravenous Daily PRN Power Collier MD   2 mg at 07/03/19 1803    ondansetron injection 4 mg  4 mg Intravenous Q8H PRN Power Collier MD        sodium chloride 0.9% flush 10 mL  10 mL Intravenous PRN Power Collier MD        valproic acid capsule 250 mg  250 mg Oral BID Power Collier MD   250 mg at 07/05/19 0835     Continuous Infusions:    Review of Systems  Objective:     Vital Signs (Most Recent):  Temp: 97.8 °F (36.6 °C) (07/05/19 1122)  Pulse: 61 (07/05/19 1122)  Resp: 14 (07/05/19 1122)  BP: (!) 95/56 (07/05/19 1122)  SpO2: 98 % (07/05/19 1122) Vital Signs (24h Range):  Temp:  [97.7 °F (36.5 °C)-98.5 °F (36.9 °C)] 97.8 °F (36.6 °C)  Pulse:  [49-68] 61  Resp:  [1-18] 14  SpO2:  [96 %-99 %] 98 %  BP:  ()/(46-66) 95/56     Weight: 56.7 kg (125 lb)  Body mass index is 20.8 kg/m².    Physical Exam  Vitals:    07/05/19 0455 07/05/19 0501 07/05/19 0723 07/05/19 1122   BP: (!) 91/54 92/61 (!) 93/51 (!) 95/56   BP Location:   Right arm Right arm   Patient Position:   Lying Lying   Pulse:   (!) 58 61   Resp:   14 14   Temp:   97.8 °F (36.6 °C) 97.8 °F (36.6 °C)   TempSrc:   Oral Oral   SpO2:   97% 98%   Weight:       Height:           General: NAD, well nourished   Eyes: no tearing, discharge, no erythema   ENT: moist mucous membranes of the oral cavity, nares patent    Neck: Supple, full range of motion  Cardiovascular: Warm and well perfused, pulses equal and symmetrical  Lungs: Normal work of breathing, normal chest wall excursions  Skin: No rash, lesions, or breakdown on exposed skin  Psychiatry: Mood and affect are appropriate   Abdomen: soft, non tender, non distended  Extremeties: No cyanosis, clubbing or edema.    Neurological   MENTAL STATUS: Alert and oriented to person, place, and time. Attention and concentration within normal limits. Speech without dysarthria Recent and remote memory within normal limits   CRANIAL NERVES: Visual fields intact. PERRL. EOMI. Facial sensation intact. Face symmetrical. Hearing grossly intact. Full shoulder shrug bilaterally. Tongue protrudes midline   SENSORY: Sensation is intact to light touch throughout.    MOTOR: Normal bulk and tone.  5/5 deltoid, biceps, triceps, interosseous, hand  bilaterally. 5/5 iliopsoas, knee extension/flexion, foot dorsi/plantarflexion bilaterally.    REFLEXES: Symmetric and 2+ throughout.  CEREBELLAR/COORDINATION/GAIT: Gait steady with normal arm swing and stride length. Normal rapid alternating movements.        Significant Labs: All pertinent lab results from the past 24 hours have been reviewed.    Significant Studies: I have reviewed all pertinent imaging results/findings within the past 24 hours.    Assessment and Plan:   * Complex  partial epilepsy with generalization and with intractable epilepsy  34F w/ PMH reported focal onset epilepsy with multiple event types in house for event characterization    The patient's AED regimen is being held/reduced/changed and/or the patient is undergoing provocative interventions in an effort to capture clinical events, making inpatient admission medically necessary for patient safety.  This patient is felt to be high risk due to the likelihood of seizures during this admission.       -- dc'd keppra, not providing adequate seizure control at max doses  -- loaded depakote, start depakote 250 mg BID with lamictal 25 mg daily, will move forward with combination therapy while lamictal being increased  -- onfi 10 mg daily as adjuvant  - ativan PRN prolonged seizure or seizure cluster  -- continue vEEG  -- seizure precautions  -- may require neuropsych involvement    PTSD (post-traumatic stress disorder)  -- will need neuropsych as an outpatient    Anxiety and depression  Will need neuropsych as an outpatient      Active Diagnoses:    Diagnosis Date Noted POA    PRINCIPAL PROBLEM:  Complex partial epilepsy with generalization and with intractable epilepsy [G40.219] 07/03/2019 Yes    Anxiety and depression [F41.9, F32.9] 07/05/2019 Yes    PTSD (post-traumatic stress disorder) [F43.10] 07/05/2019 Yes      Problems Resolved During this Admission:       VTE Risk Mitigation (From admission, onward)        Ordered     IP VTE LOW RISK PATIENT  Once      07/03/19 1247     Place sequential compression device  Until discontinued      07/03/19 1247          Power Collier MD  Neurology-Epilepsy  [facility]

## 2019-07-05 NOTE — PLAN OF CARE
PCP: Primary Doctor No  Pharmacy:   [id number] - [place], [place] - [address] [facility]  [address]  [place] [address]  Phone: [phone] Fax: [phone]         07/05/19 1010   Discharge Assessment   Assessment Type Discharge Planning Assessment   Confirmed/corrected address and phone number on facesheet? Yes   Assessment information obtained from? Patient   Expected Length of Stay (days)   (TBD)   Communicated expected length of stay with patient/caregiver yes   Prior to hospitilization cognitive status: Alert/Oriented;No Deficits   Prior to hospitalization functional status: Independent   Current cognitive status: Alert/Oriented;No Deficits   Current Functional Status: Independent   Lives With parent(s)   Able to Return to Prior Arrangements yes   Is patient able to care for self after discharge? Yes   Who are your caregiver(s) and their phone number(s)? ariel collins Father     [phone]    Readmission Within the Last 30 Days no previous admission in last 30 days   Patient currently being followed by outpatient case management? No   Patient currently receives any other outside agency services? No   Equipment Currently Used at Home none   Do you have any problems affording any of your prescribed medications? No   Is the patient taking medications as prescribed? yes   Does the patient have transportation home? Yes   Transportation Anticipated family or friend will provide   Does the patient receive services at the [facility]? No   Discharge Plan A Home   Discharge Plan B Home;Home with family   DME Needed Upon Discharge  none   Patient/Family in Agreement with Plan yes

## 2019-07-05 NOTE — PLAN OF CARE
Problem: Adult Inpatient Plan of Care  Goal: Absence of Hospital-Acquired Illness or Injury    Intervention: Identify and Manage Fall Risk  NAEO.  POC and meds reviewed with patient and father at bedside. Questions encouraged and answered. Patient and father both verbalized understanding.   V/S stable throughout shift - BP trends low. Pt states it has always been low and asymptomatic; please see flowsheets for full assessment data.   Fall/safety/seizure precautions implemented and maintained.   EEG monitoring in place.  Pt ambulates to toilet independently with standby assist.  Padded siderails up x3, bed locked in lowest position, call bell in reach, O2 & suction at bedside, [facility].

## 2019-07-05 NOTE — TELEPHONE ENCOUNTER
Called in lamictal 25 mg qd for 2 weeks                               50 mg qd for 2 weeks                               75 mg qd for 2 weeks                               100 mg qd for 2 weeks.                   Onfi 10 mg qd #30 with 5 refills.                   Depakote 250 mg bid #60 with 5 refills. Per order of  for bedside delivery. F/U appt with  in 4-6 weeks.

## 2019-07-06 VITALS
SYSTOLIC BLOOD PRESSURE: 91 MMHG | HEIGHT: 65 IN | TEMPERATURE: 98 F | BODY MASS INDEX: 20.83 KG/M2 | OXYGEN SATURATION: 98 % | DIASTOLIC BLOOD PRESSURE: 53 MMHG | HEART RATE: 66 BPM | WEIGHT: 125 LBS | RESPIRATION RATE: 16 BRPM

## 2019-07-06 PROCEDURE — 99239 HOSP IP/OBS DSCHRG MGMT >30: CPT | Mod: ,,, | Performed by: PSYCHIATRY & NEUROLOGY

## 2019-07-06 PROCEDURE — 25000003 PHARM REV CODE 250: Performed by: PSYCHIATRY & NEUROLOGY

## 2019-07-06 PROCEDURE — 99239 PR HOSPITAL DISCHARGE DAY,>30 MIN: ICD-10-PCS | Mod: ,,, | Performed by: PSYCHIATRY & NEUROLOGY

## 2019-07-06 RX ORDER — LORAZEPAM 1 MG/1
TABLET ORAL
Qty: 18 TABLET | Refills: 0 | Status: SHIPPED | OUTPATIENT
Start: 2019-07-06 | End: 2019-08-11

## 2019-07-06 RX ADMIN — LAMOTRIGINE 25 MG: 25 TABLET ORAL at 08:07

## 2019-07-06 RX ADMIN — CLOBAZAM 10 MG: 10 TABLET ORAL at 08:07

## 2019-07-06 RX ADMIN — VALPROIC ACID 250 MG: 250 CAPSULE, LIQUID FILLED ORAL at 08:07

## 2019-07-06 NOTE — DISCHARGE INSTRUCTIONS
First Aid: Seizures  A seizure results from a sudden rush of abnormal electrical signals in the brain. Symptoms may range from a minor daze to uncontrollable muscle spasms (convulsions). In many cases, the victim will lose consciousness. A seizure can be caused by a high fever, head injury, drug reaction, or condition such as epilepsy.  1. Protect the head  · Help the victim to the floor if he or she begins losing muscle control. Turn the person on his or her side to prevent choking.  · Protect the victim's head from injury by placing something soft, such as folded clothes, beneath it, and by moving objects away from the victim.  · Don't cause injury by restraining the person or by placing anything in his or her mouth.  · Remove eyeglasses.  2.  Preserve dignity  · Clear away bystanders.  · Reassure the victim, who may be confused, drowsy, or hostile when coming out of the seizure.  · Cover the person or provide dry clothes if muscle spasms have caused a loss of bladder control.  3. Check for injury  · Make sure the victim's mental state has returned to normal. One way to do this is to ask the person his or her name, the year, and your location.  · Injuries can occur to the head, mouth, tongue, or body.   4. Call 911  · If the seizure lasts longer than five minutes (Note: Timing the seizure and recovery time is helpful in many cases.)  · If a second seizure occurs  · If the victim doesnt regain consciousness  · If the victim is pregnant  · If the victim has no history of seizures  · If the person has sustained an injury during the seizure. (Note: If the injury is not severe or life threatening, it may be more appropriate to seek treatment with the primary care provider.)  Date Last Reviewed: 10/19/2015  © 2006-0557 [employer]. [address] [place] [address], [place] [address]. All rights reserved. This information is not intended as a substitute for professional medical care. Always follow your healthcare  professional's instructions.

## 2019-07-06 NOTE — NURSING
Patient received discharge orders. Discharge instructions reviewed with family and patient. Patent demonstrated understanding of discharge instructions. IV and tele removed. Waiting for prescriptions to be delivered to bedside. Waiting for transportation to be discharged home with family. Martha.

## 2019-07-06 NOTE — PROGRESS NOTES
[facility]  Neurology-Epilepsy  Progress Note    Patient Name: Mary Haywood  MRN: [id number]  Admission Date: 7/3/2019  Hospital Length of Stay: 3 days  Code Status: Full Code   Attending Provider: Power Collier MD  Primary Care Physician: Primary Doctor No   Principal Problem:Complex partial epilepsy with generalization and with intractable epilepsy    Subjective:     Interval History: NAEON. Single seizure (patient unaware) early this morning. Tolerated Onfi without event.      Hospital Course: 7/3-7/4: 10 sz w/ L hemipheric onset, 1 with R hemispheric onset, bitemporal independent interictals  7/4 -7/5: 2 sz w/ L hemispheric onset  7/5-7/6: 1 sz 1/w/ L hemispheric onset       Current Facility-Administered Medications   Medication Dose Route Frequency Provider Last Rate Last Dose    acetaminophen tablet 650 mg  650 mg Oral Q4H PRN Power Collier MD        cloBAZam Tab 10 mg  10 mg Oral Daily Power Collier MD   10 mg at 07/06/19 0841    docusate sodium capsule 100 mg  100 mg Oral BID Power Collier MD        lamoTRIgine tablet 25 mg  25 mg Oral Daily Power Collier MD   25 mg at 07/06/19 0842    lorazepam injection 2 mg  2 mg Intravenous Daily PRN Power Collier MD   2 mg at 07/03/19 1803    ondansetron injection 4 mg  4 mg Intravenous Q8H PRN Power Collier MD        sodium chloride 0.9% flush 10 mL  10 mL Intravenous PRN Power Collier MD        valproic acid capsule 250 mg  250 mg Oral BID Power Collier MD   250 mg at 07/06/19 0842     Continuous Infusions:    Review of Systems  Objective:     Vital Signs (Most Recent):  Temp: 98.1 °F (36.7 °C) (07/06/19 0742)  Pulse: 66 (07/06/19 0757)  Resp: 16 (07/06/19 0742)  BP: (!) 91/53 (07/06/19 0742)  SpO2: 98 % (07/06/19 0742) Vital Signs (24h Range):  Temp:  [97.5 °F (36.4 °C)-98.1 °F (36.7 °C)] 98.1 °F (36.7 °C)  Pulse:  [41-71] 66  Resp:  [14-16] 16  SpO2:  [94 %-99 %] 98 %  BP: ()/(43-62) 91/53     Weight: 56.7 kg  (125 lb)  Body mass index is 20.8 kg/m².    Physical Exam  Vitals:    07/06/19 0332 07/06/19 0408 07/06/19 0742 07/06/19 0757   BP:  (!) 87/43 (!) 91/53    BP Location:  Right arm Right arm    Patient Position:  Lying Lying    Pulse: (!) 41 (!) 59 60 66   Resp:  16 16    Temp:  97.6 °F (36.4 °C) 98.1 °F (36.7 °C)    TempSrc:  Oral Oral    SpO2:  95% 98%    Weight:       Height:           General: NAD, well nourished   Eyes: no tearing, discharge, no erythema   ENT: moist mucous membranes of the oral cavity, nares patent    Neck: Supple, full range of motion  Cardiovascular: Warm and well perfused, pulses equal and symmetrical  Lungs: Normal work of breathing, normal chest wall excursions  Skin: No rash, lesions, or breakdown on exposed skin  Psychiatry: Mood and affect are appropriate   Abdomen: soft, non tender, non distended  Extremeties: No cyanosis, clubbing or edema.    Neurological   MENTAL STATUS: Alert and oriented to person, place, and time. Attention and concentration within normal limits. Speech without dysarthria Recent and remote memory within normal limits   CRANIAL NERVES: Visual fields intact. PERRL. EOMI. Facial sensation intact. Face symmetrical. Hearing grossly intact. Full shoulder shrug bilaterally. Tongue protrudes midline   SENSORY: Sensation is intact to light touch throughout.    MOTOR: Normal bulk and tone.  5/5 deltoid, biceps, triceps, interosseous, hand  bilaterally. 5/5 iliopsoas, knee extension/flexion, foot dorsi/plantarflexion bilaterally.    REFLEXES: Symmetric and 2+ throughout.  CEREBELLAR/COORDINATION/GAIT: Gait steady with normal arm swing and stride length. Normal rapid alternating movements.        Significant Labs: All pertinent lab results from the past 24 hours have been reviewed.    Significant Studies: I have reviewed all pertinent imaging results/findings within the past 24 hours.    Assessment and Plan:   * Complex partial epilepsy with generalization and with  intractable epilepsy  34F w/ PMH reported focal onset epilepsy with multiple event types in house for event characterization    The patient's AED regimen is being held/reduced/changed and/or the patient is undergoing provocative interventions in an effort to capture clinical events, making inpatient admission medically necessary for patient safety.  This patient is felt to be high risk due to the likelihood of seizures during this admission.       -- dc'd keppra, not providing adequate seizure control at max doses  -- depakote 250 mg BID with lamictal 25 mg daily, will move forward with combination therapy while lamictal being increased  -- onfi 10 mg daily as adjuvant  -- ativan taper at discharge  - ativan PRN prolonged seizure or seizure cluster  -- discontinue vEEG  -- seizure precautions  -- may require neuropsych involvement as outpatient    PTSD (post-traumatic stress disorder)  -- will need neuropsych as an outpatient    Anxiety and depression  Will need neuropsych as an outpatient      Active Diagnoses:    Diagnosis Date Noted POA    PRINCIPAL PROBLEM:  Complex partial epilepsy with generalization and with intractable epilepsy [G40.219] 07/03/2019 Yes    Anxiety and depression [F41.9, F32.9] 07/05/2019 Yes    PTSD (post-traumatic stress disorder) [F43.10] 07/05/2019 Yes      Problems Resolved During this Admission:       VTE Risk Mitigation (From admission, onward)        Ordered     IP VTE LOW RISK PATIENT  Once      07/03/19 1247     Place sequential compression device  Until discontinued      07/03/19 1247          Power Collier MD  Neurology-Epilepsy  [facility]
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
## 2019-07-06 NOTE — PLAN OF CARE
Problem: Adult Inpatient Plan of Care  Goal: Plan of Care Review  NAEO.  POC and meds reviewed with patient, questions encouraged and answered. Patient verbalized understanding.  V/S stable throughout shift; please see flowsheets for full assessment data.    - BP trends low and bradycardia present - asymptomatic, pt states both have been present since childhood.   EEG monitoring in place.  Fall/safety/seizure precautions implemented and maintained.  Padded siderails up x3, bed locked in lowest position, call bell in reach, O2 & suction at bedside, WCTM.

## 2019-07-08 LAB
ESLICARBAZEPINE: <0.4 MCG/ML
LEVETIRACETAM SERPL-MCNC: 51.4 UG/ML (ref 3–60)

## 2019-07-08 NOTE — PLAN OF CARE
07/08/19 0756   Final Note   Assessment Type Final Discharge Note   Anticipated Discharge Disposition Home   Right Care Referral Info   Post Acute Recommendation No Care

## 2019-07-11 ENCOUNTER — PATIENT MESSAGE (OUTPATIENT)
Dept: NEUROLOGY | Facility: CLINIC | Age: 34
End: 2019-07-11

## 2019-07-14 ENCOUNTER — PATIENT MESSAGE (OUTPATIENT)
Dept: NEUROLOGY | Facility: CLINIC | Age: 34
End: 2019-07-14

## 2019-07-15 NOTE — TELEPHONE ENCOUNTER
Called patient about medication plan. Left the hospital with the impression that she would be on lamictal for extended time, depakote was supposed to be short time but there was some confusion about prescriptions.     Instructions on discharge:   Ativan 1mg TID x3d, 1mg BID x3d, 1mg Qday x3d  Clobazam 10mg daily   Lamictal 25 mg daily x2w, 2tab x2w, 3tab x2w, 4tab x2w  Depakote 250 twice daily    Seizures since leaving the hospital: none since she has been awake. She has had one nocturnal seizure.     Plan moving forward:   7/15/2019   **Stop Ativan   **Increase Clobazam to 10mg twice daily   Lamictal 25mg twice daily   Depakote 250 twice daily    7/22/2019   Clobazam 10mg twice daily   Lamictal 25mg twice daily   **Decrease Depakote to 250 daily    7/29/2019   Clobazam 10mg twice daily   **Increase Lamictal 50mg twice daily   Depakote 250 daily    8/5/2019   Clobazam 10mg twice daily   Lamictal 50mg twice daily   **Stop Depakote    8/12/2019   Clobazam 10mg twice daily   **Increase Lamictal 100mg twice daily     If there are breakthrough seizures, (more than 1 per week) while we make these changes, we will:  1) increase clobazam to 20mg twice daily   Or   2) discuss if we need to keep depakote on a little longer  Or   3) see if we can start vimpat.     You told me that during the EMU in [place], Vimpat was effective and you would be able to get this now.  This is wonderful news. We will use it if we need it.     Patient portal me with breakthrough seizures > 1 per week or if you need refills.      Charmaine Hennessy

## 2019-07-21 ENCOUNTER — PATIENT MESSAGE (OUTPATIENT)
Dept: NEUROLOGY | Facility: CLINIC | Age: 34
End: 2019-07-21

## 2019-07-23 ENCOUNTER — PATIENT MESSAGE (OUTPATIENT)
Dept: NEUROLOGY | Facility: CLINIC | Age: 34
End: 2019-07-23

## 2019-07-25 ENCOUNTER — PATIENT MESSAGE (OUTPATIENT)
Dept: NEUROLOGY | Facility: CLINIC | Age: 34
End: 2019-07-25

## 2019-07-26 ENCOUNTER — TELEPHONE (OUTPATIENT)
Dept: NEUROLOGY | Facility: CLINIC | Age: 34
End: 2019-07-26

## 2019-07-26 ENCOUNTER — PATIENT MESSAGE (OUTPATIENT)
Dept: NEUROLOGY | Facility: CLINIC | Age: 34
End: 2019-07-26

## 2019-07-26 DIAGNOSIS — F32.A DEPRESSION, UNSPECIFIED DEPRESSION TYPE: Primary | ICD-10-CM

## 2019-07-26 DIAGNOSIS — F41.9 ANXIETY: ICD-10-CM

## 2019-07-26 DIAGNOSIS — G40.109 TEMPORAL LOBE EPILEPSY: ICD-10-CM

## 2019-07-26 RX ORDER — CLOBAZAM 10 MG/1
10 TABLET ORAL 2 TIMES DAILY
Qty: 60 EACH | Refills: 5 | Status: SHIPPED | OUTPATIENT
Start: 2019-07-26 | End: 2019-08-11

## 2019-07-26 NOTE — TELEPHONE ENCOUNTER
Asked her father to print the plan.    Currently taking:   Clobazam to 10mg twice daily   Lamictal 25mg twice daily   Depakote 250 twice daily    Asked her to take:   Clobazam to 10mg twice daily (sent the Rx to the pharmacy)  Lamictal 25mg twice daily   Depakote 250 once daily    Not on anything for depression and anxiety. First seizure was triggered Wellbutrin. ?PTSD. She is ready for a psychiatry referral and to try medication for mood.     She is not interested in any kind of benzodiazepines.     No seizures since they last contacted me.     Charmaine Hennessy MD PhD  Neurology-Epilepsy  [facility].  [facility]

## 2019-07-29 ENCOUNTER — PATIENT MESSAGE (OUTPATIENT)
Dept: NEUROLOGY | Facility: CLINIC | Age: 34
End: 2019-07-29

## 2019-07-29 ENCOUNTER — TELEPHONE (OUTPATIENT)
Dept: NEUROLOGY | Facility: CLINIC | Age: 34
End: 2019-07-29

## 2019-07-29 NOTE — TELEPHONE ENCOUNTER
Pa for clobazam 20mg BID was approved until 7/5/2021. Ref.#: [id number]. I [facility] as ordered per Dr. Hennessy. Family will be notified when order is ready

## 2019-08-09 ENCOUNTER — OFFICE VISIT (OUTPATIENT)
Dept: NEUROLOGY | Facility: CLINIC | Age: 34
End: 2019-08-09
Payer: MEDICARE

## 2019-08-09 VITALS
HEART RATE: 53 BPM | WEIGHT: 122.38 LBS | HEIGHT: 65 IN | BODY MASS INDEX: 20.39 KG/M2 | SYSTOLIC BLOOD PRESSURE: 100 MMHG | DIASTOLIC BLOOD PRESSURE: 47 MMHG

## 2019-08-09 DIAGNOSIS — F43.10 PTSD (POST-TRAUMATIC STRESS DISORDER): ICD-10-CM

## 2019-08-09 DIAGNOSIS — F32.A ANXIETY AND DEPRESSION: ICD-10-CM

## 2019-08-09 DIAGNOSIS — G40.109 TEMPORAL LOBE EPILEPSY: ICD-10-CM

## 2019-08-09 DIAGNOSIS — F41.9 ANXIETY AND DEPRESSION: ICD-10-CM

## 2019-08-09 DIAGNOSIS — G40.219 COMPLEX PARTIAL EPILEPSY WITH GENERALIZATION AND WITH INTRACTABLE EPILEPSY: Primary | ICD-10-CM

## 2019-08-09 PROCEDURE — 3008F PR BODY MASS INDEX (BMI) DOCUMENTED: ICD-10-PCS | Mod: CPTII,S$GLB,, | Performed by: PSYCHIATRY & NEUROLOGY

## 2019-08-09 PROCEDURE — 99999 PR PBB SHADOW E&M-EST. PATIENT-LVL III: CPT | Mod: PBBFAC,,, | Performed by: PSYCHIATRY & NEUROLOGY

## 2019-08-09 PROCEDURE — 99215 OFFICE O/P EST HI 40 MIN: CPT | Mod: S$GLB,,, | Performed by: PSYCHIATRY & NEUROLOGY

## 2019-08-09 PROCEDURE — 99215 PR OFFICE/OUTPT VISIT, EST, LEVL V, 40-54 MIN: ICD-10-PCS | Mod: S$GLB,,, | Performed by: PSYCHIATRY & NEUROLOGY

## 2019-08-09 PROCEDURE — 99999 PR PBB SHADOW E&M-EST. PATIENT-LVL III: ICD-10-PCS | Mod: PBBFAC,,, | Performed by: PSYCHIATRY & NEUROLOGY

## 2019-08-09 PROCEDURE — 3008F BODY MASS INDEX DOCD: CPT | Mod: CPTII,S$GLB,, | Performed by: PSYCHIATRY & NEUROLOGY

## 2019-08-09 RX ORDER — LAMOTRIGINE 150 MG/1
150 TABLET ORAL 2 TIMES DAILY
Qty: 60 TABLET | Refills: 11 | Status: SHIPPED | OUTPATIENT
Start: 2019-08-09 | End: 2020-06-03 | Stop reason: DRUGHIGH

## 2019-08-09 NOTE — PATIENT INSTRUCTIONS
You came to neurology clinic because of refractory seizures. I would like you to continue to keep a seizure diary. I need to know how many behavior arrests you are having. Continue the medication taper as we wrote out before with a single change.     8/12/2019   Clobazam 10mg twice daily   **Increase Lamictal 100mg twice daily (four pills twice daily)     8/26/2019   Clobazam 10mg twice daily   **Increase Lamictal to 150mg twice daily (six pills twice daily) - I also sent in the prescriptions for this dose.     Please follow up with psychiatry for treatment of your anxiety and depression.     If you have many breakthrough seizures, the next step is to increase Onfi to 20mg twice daily. Please patient portal me with any issues. Come back to clinic in 3 months.     Charmaine Hennessy MD PhD  Neurology-Epilepsy  [facility].  [facility]
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
## 2019-08-11 RX ORDER — CLOBAZAM 20 MG/1
10 TABLET ORAL 2 TIMES DAILY
Refills: 5 | COMMUNITY
Start: 2019-07-29 | End: 2019-08-28 | Stop reason: SDUPTHER

## 2019-08-16 ENCOUNTER — PATIENT MESSAGE (OUTPATIENT)
Dept: NEUROLOGY | Facility: CLINIC | Age: 34
End: 2019-08-16

## 2019-08-26 ENCOUNTER — PATIENT MESSAGE (OUTPATIENT)
Dept: PSYCHIATRY | Facility: CLINIC | Age: 34
End: 2019-08-26

## 2019-08-28 ENCOUNTER — PATIENT MESSAGE (OUTPATIENT)
Dept: NEUROLOGY | Facility: HOSPITAL | Age: 34
End: 2019-08-28

## 2019-08-28 RX ORDER — CLOBAZAM 20 MG/1
10 TABLET ORAL 2 TIMES DAILY
Qty: 30 TABLET | Refills: 5 | Status: SHIPPED | OUTPATIENT
Start: 2019-08-28 | End: 2020-02-26 | Stop reason: SDUPTHER

## 2019-09-04 ENCOUNTER — PATIENT MESSAGE (OUTPATIENT)
Dept: NEUROLOGY | Facility: CLINIC | Age: 34
End: 2019-09-04
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
## 2019-09-06 ENCOUNTER — PATIENT MESSAGE (OUTPATIENT)
Dept: NEUROLOGY | Facility: CLINIC | Age: 34
End: 2019-09-06

## 2019-09-06 ENCOUNTER — TELEPHONE (OUTPATIENT)
Dept: NEUROLOGY | Facility: CLINIC | Age: 34
End: 2019-09-06

## 2019-09-06 DIAGNOSIS — F32.A DEPRESSION, UNSPECIFIED DEPRESSION TYPE: Primary | ICD-10-CM

## 2019-09-06 RX ORDER — SERTRALINE HYDROCHLORIDE 25 MG/1
25 TABLET, FILM COATED ORAL DAILY
Qty: 30 TABLET | Refills: 11 | Status: SHIPPED | OUTPATIENT
Start: 2019-09-06 | End: 2020-03-19 | Stop reason: DRUGHIGH
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
## 2019-10-04 ENCOUNTER — OFFICE VISIT (OUTPATIENT)
Dept: PSYCHIATRY | Facility: CLINIC | Age: 34
End: 2019-10-04
Payer: MEDICARE

## 2019-10-04 DIAGNOSIS — F43.10 PTSD (POST-TRAUMATIC STRESS DISORDER): Primary | ICD-10-CM

## 2019-10-04 PROCEDURE — 90791 PSYCH DIAGNOSTIC EVALUATION: CPT | Mod: S$GLB,,, | Performed by: SOCIAL WORKER

## 2019-10-04 PROCEDURE — 90791 PR PSYCHIATRIC DIAGNOSTIC EVALUATION: ICD-10-PCS | Mod: S$GLB,,, | Performed by: SOCIAL WORKER
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
## 2019-11-21 ENCOUNTER — PATIENT MESSAGE (OUTPATIENT)
Dept: NEUROLOGY | Facility: CLINIC | Age: 34
End: 2019-11-21

## 2019-11-21 DIAGNOSIS — Z00.00 ROUTINE ADULT HEALTH MAINTENANCE: Primary | ICD-10-CM

## 2019-11-21 DIAGNOSIS — Z72.0 CURRENTLY ATTEMPTING TO QUIT SMOKING: ICD-10-CM

## 2019-11-22 NOTE — TELEPHONE ENCOUNTER
PCP referral  Smoking cessation program    Charmaine Hennessy MD PhD  Neurology-Epilepsy  [facility].  [facility]

## 2019-12-03 ENCOUNTER — OFFICE VISIT (OUTPATIENT)
Dept: FAMILY MEDICINE | Facility: CLINIC | Age: 34
End: 2019-12-03
Payer: MEDICARE

## 2019-12-03 VITALS
TEMPERATURE: 98 F | HEART RATE: 71 BPM | WEIGHT: 134 LBS | BODY MASS INDEX: 22.33 KG/M2 | SYSTOLIC BLOOD PRESSURE: 82 MMHG | HEIGHT: 65 IN | DIASTOLIC BLOOD PRESSURE: 56 MMHG | OXYGEN SATURATION: 99 %

## 2019-12-03 DIAGNOSIS — F32.A ANXIETY AND DEPRESSION: ICD-10-CM

## 2019-12-03 DIAGNOSIS — G40.219 COMPLEX PARTIAL EPILEPSY WITH GENERALIZATION AND WITH INTRACTABLE EPILEPSY: Primary | ICD-10-CM

## 2019-12-03 DIAGNOSIS — Z71.89 ENCOUNTER FOR BREAST SELF EXAMINATION EDUCATION: ICD-10-CM

## 2019-12-03 DIAGNOSIS — F41.9 ANXIETY AND DEPRESSION: ICD-10-CM

## 2019-12-03 DIAGNOSIS — F17.210 TOBACCO DEPENDENCE DUE TO CIGARETTES: ICD-10-CM

## 2019-12-03 PROBLEM — B00.9 HSV-1 INFECTION: Status: ACTIVE | Noted: 2019-12-03

## 2019-12-03 PROBLEM — B00.9 HSV-2 INFECTION: Status: ACTIVE | Noted: 2019-12-03

## 2019-12-03 PROCEDURE — 3008F PR BODY MASS INDEX (BMI) DOCUMENTED: ICD-10-PCS | Mod: S$GLB,,, | Performed by: INTERNAL MEDICINE

## 2019-12-03 PROCEDURE — 99202 OFFICE O/P NEW SF 15 MIN: CPT | Mod: S$GLB,,, | Performed by: INTERNAL MEDICINE

## 2019-12-03 PROCEDURE — 99202 PR OFFICE/OUTPT VISIT, NEW, LEVL II, 15-29 MIN: ICD-10-PCS | Mod: S$GLB,,, | Performed by: INTERNAL MEDICINE

## 2019-12-03 PROCEDURE — 3008F BODY MASS INDEX DOCD: CPT | Mod: S$GLB,,, | Performed by: INTERNAL MEDICINE

## 2019-12-03 RX ORDER — VALACYCLOVIR HYDROCHLORIDE 1 G/1
1000 TABLET, FILM COATED ORAL 2 TIMES DAILY
Refills: 0 | Status: ON HOLD | COMMUNITY
Start: 2019-11-24 | End: 2020-11-09

## 2019-12-03 NOTE — PROGRESS NOTES
Subjective:       Patient ID: Mary Haywood is a 34 y.o. female.    Chief Complaint: Establish Care (new patient establishment) and Annual Exam    Here to establish care.  She has a h/o epilepsy, PTSD and is followed by Neuro and Psych.  Hasn't really had a PCP recently; states she has had issues finding someone who would see her d/t her preexisting conditions.   It has been about 5 months since her last seizure; that's the longest she has been seizure free since 2012.  She reports a recent diagnosis of HSV; went to an urgent care after noticing a sore.   Told she was also tested for GC/chlamydia, syphilis, HIV which were negative.       Review of Systems   Constitutional: Negative for chills, fatigue, fever and unexpected weight change.   HENT: Positive for congestion and postnasal drip. Negative for hearing loss, rhinorrhea, trouble swallowing and voice change.    Eyes: Positive for visual disturbance. Negative for photophobia.   Respiratory: Positive for cough. Negative for apnea, choking, chest tightness and wheezing. Shortness of breath: on exertion.    Cardiovascular: Negative for chest pain, palpitations and leg swelling.   Gastrointestinal: Positive for constipation. Negative for abdominal pain, blood in stool, diarrhea, nausea, rectal pain and vomiting.   Endocrine: Positive for cold intolerance. Negative for heat intolerance, polydipsia and polyuria.   Genitourinary: Positive for decreased urine volume and urgency. Negative for difficulty urinating, dysuria, frequency, genital sores, hematuria, menstrual problem, pelvic pain, vaginal bleeding and vaginal discharge.   Musculoskeletal: Positive for back pain. Negative for arthralgias, gait problem, joint swelling, myalgias, neck pain and neck stiffness.   Skin: Negative for color change, rash and wound.   Allergic/Immunologic: Negative for environmental allergies and food allergies.   Neurological: Positive for seizures (epilepsy) and speech  difficulty. Negative for dizziness, tremors, syncope, facial asymmetry, weakness, light-headedness, numbness and headaches.   Hematological: Negative for adenopathy. Does not bruise/bleed easily.   Psychiatric/Behavioral: Negative for confusion, hallucinations, sleep disturbance and suicidal ideas. The patient is nervous/anxious.        Past Medical History:   Diagnosis Date    Anxiety and depression 7/5/2019    Complex partial epilepsy with generalization and with intractable epilepsy 7/3/2019         HSV-1 infection     HSV-2 infection     PTSD (post-traumatic stress disorder)       Past Surgical History:   Procedure Laterality Date    HYSTERECTOMY  2012    endometriosis    OOPHORECTOMY  2012    ovarian cyst       Family History   Adopted: Yes   Family history unknown: Yes       Social History     Socioeconomic History    Marital status: Single     Spouse name: Not on file    Number of children: Not on file    Years of education: Not on file    Highest education level: Not on file   Occupational History    Occupation: disability   Social Needs    Financial resource strain: Not on file    Food insecurity:     Worry: Not on file     Inability: Not on file    Transportation needs:     Medical: Not on file     Non-medical: Not on file   Tobacco Use    Smoking status: Current Every Day Smoker     Packs/day: 1.00    Smokeless tobacco: Never Used   Substance and Sexual Activity    Alcohol use: Yes     Frequency: Monthly or less     Drinks per session: 3 or 4    Drug use: Yes     Types: Marijuana    Sexual activity: Yes     Partners: Male     Birth control/protection: See Surgical Hx, Surgical   Lifestyle    Physical activity:     Days per week: Not on file     Minutes per session: Not on file    Stress: To some extent   Relationships    Social connections:     Talks on phone: Not on file     Gets together: Not on file     Attends Gnosticist service: Not on file     Active member of club or  "organization: Not on file     Attends meetings of clubs or organizations: Not on file     Relationship status: Not on file   Other Topics Concern    Not on file   Social History Narrative    Live alone       Current Outpatient Medications   Medication Sig Dispense Refill    cloBAZam (ONFI) 20 mg Tab Take 0.5 tablets (10 mg total) by mouth 2 (two) times daily. 30 tablet 5    lamoTRIgine (LAMICTAL) 150 MG Tab Take 1 tablet (150 mg total) by mouth 2 (two) times daily. 60 tablet 11    sertraline (ZOLOFT) 25 MG tablet Take 1 tablet (25 mg total) by mouth once daily. 30 tablet 11    valACYclovir (VALTREX) 1000 MG tablet Take 1,000 mg by mouth 2 (two) times daily.  0     No current facility-administered medications for this visit.        Review of patient's allergies indicates:  No Known Allergies  Objective:    HPI     Establish Care      Additional comments: new patient establishment          Last edited by Arjun Shah MA on 12/3/2019  1:30 PM. (History)      Blood pressure (!) 82/56, pulse 71, temperature 97.7 °F (36.5 °C), temperature source Temporal, height 5' 5" (1.651 m), weight 60.8 kg (134 lb), last menstrual period 01/01/2011, SpO2 99 %. Body mass index is 22.3 kg/m².   Physical Exam   Constitutional: She appears well-developed.   HENT:   Head: Normocephalic and atraumatic.   Right Ear: Hearing, tympanic membrane, external ear and ear canal normal.   Left Ear: Hearing, tympanic membrane, external ear and ear canal normal.   Nose: Nose normal.   Mouth/Throat: Uvula is midline and oropharynx is clear and moist.   Eyes: Pupils are equal, round, and reactive to light. Conjunctivae, EOM and lids are normal. Right eye exhibits no discharge. Left eye exhibits no discharge. Right conjunctiva is not injected. Right conjunctiva has no hemorrhage. Left conjunctiva is not injected. Left conjunctiva has no hemorrhage. No scleral icterus.   Neck: Carotid bruit is not present. No thyromegaly present.   Cardiovascular: " Normal rate, regular rhythm and normal heart sounds. Exam reveals no gallop and no friction rub.   No murmur heard.  Pulses:       Dorsalis pedis pulses are 2+ on the right side, and 2+ on the left side.   Pulmonary/Chest: Effort normal and breath sounds normal. No respiratory distress. She has no wheezes. She has no rhonchi. She has no rales.   Abdominal: Soft. Bowel sounds are normal. She exhibits no distension, no abdominal bruit, no pulsatile midline mass and no mass. There is no hepatosplenomegaly. There is no tenderness. There is no rebound and no guarding. No hernia.   Musculoskeletal: She exhibits no edema.   Lymphadenopathy:     She has no cervical adenopathy.   Neurological: She is alert. She has normal reflexes. She displays no tremor. No cranial nerve deficit.   Skin: Skin is warm and dry.   Psychiatric: She has a normal mood and affect. Her speech is normal and behavior is normal.   Nursing note and vitals reviewed.        Admission on 07/03/2019, Discharged on 07/06/2019   Component Date Value Ref Range Status    Sodium 07/03/2019 139  136 - 145 mmol/L Final    Potassium 07/03/2019 3.1* 3.5 - 5.1 mmol/L Final    Chloride 07/03/2019 105  95 - 110 mmol/L Final    CO2 07/03/2019 27  23 - 29 mmol/L Final    Glucose 07/03/2019 142* 70 - 110 mg/dL Final    BUN, Bld 07/03/2019 6  6 - 20 mg/dL Final    Creatinine 07/03/2019 0.8  0.5 - 1.4 mg/dL Final    Calcium 07/03/2019 9.0  8.7 - 10.5 mg/dL Final    Total Protein 07/03/2019 6.4  6.0 - 8.4 g/dL Final    Albumin 07/03/2019 4.1  3.5 - 5.2 g/dL Final    Total Bilirubin 07/03/2019 0.6  0.1 - 1.0 mg/dL Final    Comment: For infants and newborns, interpretation of results should be based  on gestational age, weight and in agreement with clinical  observations.  Premature Infant recommended reference ranges:  Up to 24 hours.............<8.0 mg/dL  Up to 48 hours............<12.0 mg/dL  3-5 days..................<15.0 mg/dL  6-29  days.................<15.0 mg/dL      Alkaline Phosphatase 07/03/2019 65  55 - 135 U/L Final    AST 07/03/2019 12  10 - 40 U/L Final    ALT 07/03/2019 11  10 - 44 U/L Final    Anion Gap 07/03/2019 7* 8 - 16 mmol/L Final    eGFR if African American 07/03/2019 >60.0  >60 mL/min/1.73 m^2 Final    eGFR if non African American 07/03/2019 >60.0  >60 mL/min/1.73 m^2 Final    Comment: Calculation used to obtain the estimated glomerular filtration  rate (eGFR) is the CKD-EPI equation.       WBC 07/03/2019 6.36  3.90 - 12.70 K/uL Final    RBC 07/03/2019 4.14  4.00 - 5.40 M/uL Final    Hemoglobin 07/03/2019 13.5  12.0 - 16.0 g/dL Final    Hematocrit 07/03/2019 38.8  37.0 - 48.5 % Final    Mean Corpuscular Volume 07/03/2019 94  82 - 98 fL Final    Mean Corpuscular Hemoglobin 07/03/2019 32.6* 27.0 - 31.0 pg Final    Mean Corpuscular Hemoglobin Conc 07/03/2019 34.8  32.0 - 36.0 g/dL Final    RDW 07/03/2019 12.2  11.5 - 14.5 % Final    Platelets 07/03/2019 171  150 - 350 K/uL Final    MPV 07/03/2019 10.9  9.2 - 12.9 fL Final    Immature Granulocytes 07/03/2019 0.3  0.0 - 0.5 % Final    Gran # (ANC) 07/03/2019 3.1  1.8 - 7.7 K/uL Final    Immature Grans (Abs) 07/03/2019 0.02  0.00 - 0.04 K/uL Final    Comment: Mild elevation in immature granulocytes is non specific and   can be seen in a variety of conditions including stress response,   acute inflammation, trauma and pregnancy. Correlation with other   laboratory and clinical findings is essential.      Lymph # 07/03/2019 2.7  1.0 - 4.8 K/uL Final    Mono # 07/03/2019 0.4  0.3 - 1.0 K/uL Final    Eos # 07/03/2019 0.1  0.0 - 0.5 K/uL Final    Baso # 07/03/2019 0.03  0.00 - 0.20 K/uL Final    nRBC 07/03/2019 0  0 /100 WBC Final    Gran% 07/03/2019 48.1  38.0 - 73.0 % Final    Lymph% 07/03/2019 42.8  18.0 - 48.0 % Final    Mono% 07/03/2019 6.6  4.0 - 15.0 % Final    Eosinophil% 07/03/2019 1.7  0.0 - 8.0 % Final    Basophil% 07/03/2019 0.5  0.0 - 1.9 %  Final    Differential Method 07/03/2019 Automated   Final    Eslicarbazepine 07/03/2019 <0.4  mcg/mL Final    Comment: No established therapeutic range;individual patient  concentrations should be evaluated in context of  patient's clinical condition or prior concentration.  Trough plasma levels following does of 400-2400 mg/day,   averaged 2-28 mcg/mL.  Maximum plasma concentrations  following 2400 mg/day dosages averaged 55 mcg/mL.  This test was developed and its analytical performance characteristics  have been determined by [facility].  It has not been cleared or approved by the [place] FDA.  This assay has   been validated pursuant to the CLIA regulations and is used for   clinical purposes.      Levetiracetam Lvl 07/03/2019 51.4  3.0 - 60.0 ug/mL Final    Comment: Steady state trough serum or plasma levels following  doses of 1000 to 3000 mg/Day:  3 to 37 ug/mL. The same  dosage regimen will typically result in peak levels of   10 to 60 ug/mL, at approximately 1.5 hours post dose.  If applicable, any drug confirmation testing reported  here was developed and the performance characteristics  determined by [facility]. This   confirmation testing has not been cleared or approved  by the FDA. The laboratory is regulated under CLIA as  qualified to perform high-complexity testing. This test  is used for patient testing purposes. It should not be  regarded as investigational or for research.  Test performed at [facility],  [address] , [place]  [address]     [phone]  Nils Case MD  - Medical Director     ]  Assessment:       1. Complex partial epilepsy with generalization and with intractable epilepsy    2. Anxiety and depression    3. Tobacco dependence due to cigarettes    4. Encounter for breast self examination education        Plan:       Mary was seen today for establish care and annual exam.    Diagnoses and all orders for this  visit:    Complex partial epilepsy with generalization and with intractable epilepsy  Comments:  Continue follow up with Neuro    Anxiety and depression  Comments:  Followed by Psych    Tobacco dependence due to cigarettes  Comments:  Discussed weaning, nicotine replacement options.  Not really a candidate for chantix or wellbutrin    Encounter for breast self examination education  Comments:  Discussed SBE.  She wishes to defer CBE

## 2019-12-03 NOTE — LETTER
December 3, 2019      Charmaine Hennessy MD PhD  [address]  [address]  [facility] [address]           [facility]  [address] [place] [address] [address]  [facility] [address]  Phone: [phone]  Fax: [phone]          Patient: Mary Haywood   MR Number: [id number]   YOB: 1985   Date of Visit: 12/3/2019       Dear Dr. Charmaine Hennessy:    Thank you for referring Mary Haywood to me for evaluation. Attached you will find relevant portions of my assessment and plan of care.    If you have questions, please do not hesitate to call me. I look forward to following Mary Haywood along with you.    Sincerely,    Matt De La Cruz Jr., MD    Enclosure  CC:  No Recipients    If you would like to receive this communication electronically, please contact [email or web address] or [phone] to request more information on [employer] access.    For providers and/or their staff who would like to refer a patient to [facility], please contact us through our one-stop-shop provider referral line, [facility], at [phone].    If you feel you have received this communication in error or would no longer like to receive these types of communications, please e-mail [email or web address]

## 2020-01-01 ENCOUNTER — PATIENT MESSAGE (OUTPATIENT)
Dept: NEUROLOGY | Facility: CLINIC | Age: 35
End: 2020-01-01

## 2020-01-02 NOTE — TELEPHONE ENCOUNTER
Morning:   Lamictal 150mg b.i.d. SE: none recently    Clobazam 20mg Daily SE: none     June 6th last seizure. But then recent seizure. In car with dad. Started rubbing hands together. She got out of truck. Her father asked her to get back into the car.  A few minutes later, she only remembered part of the event.  Came back really fast. Only lasted about a minute. Then she was able to pick right back up. Not perfect medication compliance. Maybe missed 1-2 doses in the last two weeks. On the day of the seizure, not eating very much. Father thinks seizure caused by her not eating very much and exhausting herself cleaning her parent's house. Sleeping is regular and she is able to get up in the morning around 7:30am.  No illness at the time.    For now, strict medication compliance.  If another breakthrough seizure, the plan will be to increase clobazam to 40 mg daily.  She agrees with this plan. All her questions were answered.    Charmaine Hennessy MD PhD  Neurology-Epilepsy  [facility].  [facility]

## 2020-02-26 ENCOUNTER — PATIENT MESSAGE (OUTPATIENT)
Dept: NEUROLOGY | Facility: CLINIC | Age: 35
End: 2020-02-26

## 2020-02-26 DIAGNOSIS — G40.109 TEMPORAL LOBE EPILEPSY: Primary | ICD-10-CM

## 2020-02-26 RX ORDER — CLOBAZAM 20 MG/1
20 TABLET ORAL DAILY
Qty: 30 TABLET | Refills: 5 | Status: SHIPPED | OUTPATIENT
Start: 2020-02-26 | End: 2020-03-19 | Stop reason: DRUGHIGH

## 2020-02-26 NOTE — TELEPHONE ENCOUNTER
Onfi 20mg daily, 30 tabs, 5 refills.     Charmaine Hennessy MD PhD  Neurology-Epilepsy  [facility].  [facility]

## 2020-03-19 ENCOUNTER — PATIENT MESSAGE (OUTPATIENT)
Dept: NEUROLOGY | Facility: CLINIC | Age: 35
End: 2020-03-19

## 2020-03-19 DIAGNOSIS — G40.219 COMPLEX PARTIAL EPILEPSY WITH GENERALIZATION AND WITH INTRACTABLE EPILEPSY: ICD-10-CM

## 2020-03-19 DIAGNOSIS — F43.10 PTSD (POST-TRAUMATIC STRESS DISORDER): Primary | ICD-10-CM

## 2020-03-19 RX ORDER — CLOBAZAM 20 MG/1
40 TABLET ORAL DAILY
Qty: 180 TABLET | Refills: 1 | Status: SHIPPED | OUTPATIENT
Start: 2020-03-19 | End: 2020-07-16 | Stop reason: SDUPTHER

## 2020-03-19 RX ORDER — SERTRALINE HYDROCHLORIDE 50 MG/1
50 TABLET, FILM COATED ORAL DAILY
Qty: 90 TABLET | Refills: 3 | Status: SHIPPED | OUTPATIENT
Start: 2020-03-19 | End: 2020-06-03 | Stop reason: DRUGHIGH

## 2020-03-19 NOTE — TELEPHONE ENCOUNTER
Currently taking:   Lamictal 150mg b.i.d.    Clobazam 20mg Daily   Sertraline 25mg at night    Seizure this morning when her dad was visiting. Yesterday was home by herself, got really tired, slept 4 to 4.5 hours and was up most of the night. Wonders if she had a seizure yesterday.  Usually gets very tired with seizures. She does not have any memory of this. The seizures and her coming back lasted under 10 minutes. Usually takes 1-2 hours for her to get back to baseline so this was a relatively small event. Missed morning dose yesterday. She took both doses last night. Feels fine. Has energy. Really wants to get back into the workforce. No infection, no fever.     Discussed per [place] law, no episodes of loss of consciousness for 6 months before driving again (clocked pushed back to 9/19/2020).      Will increase to:   Lamictal 150mg b.i.d.    Clobazam 40mg daily   Sertraline 50mg at night    Medications ordered.     Charmaine Hennessy MD PhD  Neurology-Epilepsy  [facility].  [facility]

## 2020-03-20 ENCOUNTER — PATIENT MESSAGE (OUTPATIENT)
Dept: NEUROLOGY | Facility: CLINIC | Age: 35
End: 2020-03-20

## 2020-03-20 NOTE — TELEPHONE ENCOUNTER
Letter declaring disability for 2019 taxes emailed to [email or web address].    Charmaine Hennessy MD PhD  Neurology-Epilepsy  [facility].  [facility]

## 2020-03-20 NOTE — LETTER
March 20, 2020      [facility] [address]  [facility] [address]  Phone: [phone]  Fax: [phone]       Patient: Mary Haywood   YOB: 1985    To Whom It May Concern:    Irma Haywood  is under my care for refractory epilepsy.  She was totally disabled for the entire year of 2019 and unable to partake in gainful employment.  Although we are actively working together to manage this challenging condition, we have not achieved the seizure control necessary for her to be able to safely work at this time.  As a result, she is, and was for the entire year of 2019, completely dependent on her parents support.      Sincerely,        Charmaine Hennessy MD PhD

## 2020-06-02 ENCOUNTER — PATIENT MESSAGE (OUTPATIENT)
Dept: NEUROLOGY | Facility: CLINIC | Age: 35
End: 2020-06-02

## 2020-06-02 DIAGNOSIS — G40.219 COMPLEX PARTIAL EPILEPSY WITH GENERALIZATION AND WITH INTRACTABLE EPILEPSY: Primary | ICD-10-CM

## 2020-06-03 ENCOUNTER — PATIENT MESSAGE (OUTPATIENT)
Dept: NEUROLOGY | Facility: CLINIC | Age: 35
End: 2020-06-03

## 2020-06-03 DIAGNOSIS — F43.10 PTSD (POST-TRAUMATIC STRESS DISORDER): ICD-10-CM

## 2020-06-03 DIAGNOSIS — G40.219 COMPLEX PARTIAL EPILEPSY WITH GENERALIZATION AND WITH INTRACTABLE EPILEPSY: Primary | ICD-10-CM

## 2020-06-03 RX ORDER — SERTRALINE HYDROCHLORIDE 100 MG/1
100 TABLET, FILM COATED ORAL DAILY
Qty: 90 TABLET | Refills: 3 | Status: SHIPPED | OUTPATIENT
Start: 2020-06-03 | End: 2020-12-28

## 2020-06-03 RX ORDER — LAMOTRIGINE 150 MG/1
450 TABLET ORAL DAILY
Qty: 270 TABLET | Refills: 3 | Status: SHIPPED | OUTPATIENT
Start: 2020-06-03 | End: 2021-04-05 | Stop reason: SDUPTHER

## 2020-06-03 NOTE — TELEPHONE ENCOUNTER
Several breakthrough seizures.  Orders for CMP and lamotrigine/clobazam level placed.    Currently taking:   Lamictal 150mg b.i.d.    Clobazam 20mg Daily   Sertraline 25mg at night

## 2020-06-03 NOTE — TELEPHONE ENCOUNTER
Couple of partial seizures yesterday. Happened in the morning. On the phone for two of them. With her dad for the third. Bounced right back after all of them. 5-7 minutes to be back to normal for all three.     Current meds:  Lamotrigine tablets 150 mg twice a day. SE none   Clobazam 40 mg in the morning. SE none   Sertraline HCL 50 mg at night only    More depressed than normal.  Sitting around the house with no motivation.  Not enjoying normal activities.  Definitely feels as if she is in a slump.    Plan:   Get AED levels and CMP drawn as soon as possible  Increase lamotrigine to 300 mg/150 mg  Increase sertraline to 100 mg q.h.s.    She asked me to send these recommendations to her father through the portal as well.  She explicitly gave her permission for that communication.     Charmaine Hennessy MD PhD  Neurology-Epilepsy  [facility].  [facility]

## 2020-06-10 ENCOUNTER — LAB VISIT (OUTPATIENT)
Dept: LAB | Facility: HOSPITAL | Age: 35
End: 2020-06-10
Attending: PSYCHIATRY & NEUROLOGY
Payer: MEDICARE

## 2020-06-10 DIAGNOSIS — G40.219 COMPLEX PARTIAL EPILEPSY WITH GENERALIZATION AND WITH INTRACTABLE EPILEPSY: ICD-10-CM

## 2020-06-10 LAB
ALBUMIN SERPL BCP-MCNC: 4.2 G/DL (ref 3.5–5.2)
ALP SERPL-CCNC: 78 U/L (ref 55–135)
ALT SERPL W/O P-5'-P-CCNC: 15 U/L (ref 10–44)
ANION GAP SERPL CALC-SCNC: 6 MMOL/L (ref 8–16)
AST SERPL-CCNC: 14 U/L (ref 10–40)
BILIRUB SERPL-MCNC: 0.5 MG/DL (ref 0.1–1)
BUN SERPL-MCNC: 7 MG/DL (ref 6–20)
CALCIUM SERPL-MCNC: 8.4 MG/DL (ref 8.7–10.5)
CHLORIDE SERPL-SCNC: 100 MMOL/L (ref 95–110)
CO2 SERPL-SCNC: 25 MMOL/L (ref 23–29)
CREAT SERPL-MCNC: 0.7 MG/DL (ref 0.5–1.4)
EST. GFR  (AFRICAN AMERICAN): >60 ML/MIN/1.73 M^2
EST. GFR  (NON AFRICAN AMERICAN): >60 ML/MIN/1.73 M^2
GLUCOSE SERPL-MCNC: 93 MG/DL (ref 70–110)
POTASSIUM SERPL-SCNC: 3.6 MMOL/L (ref 3.5–5.1)
PROT SERPL-MCNC: 7.1 G/DL (ref 6–8.4)
SODIUM SERPL-SCNC: 131 MMOL/L (ref 136–145)

## 2020-06-10 PROCEDURE — 36415 COLL VENOUS BLD VENIPUNCTURE: CPT

## 2020-06-10 PROCEDURE — 80339 ANTIEPILEPTICS NOS 1-3: CPT

## 2020-06-10 PROCEDURE — 80053 COMPREHEN METABOLIC PANEL: CPT

## 2020-06-10 PROCEDURE — 80175 DRUG SCREEN QUAN LAMOTRIGINE: CPT

## 2020-06-13 LAB
CLOBAZAM SERPL-MCNC: 454 NG/ML (ref 30–300)
NORCLOBAZAM SERPL-MCNC: ABNORMAL NG/ML (ref 300–3000)

## 2020-06-15 LAB — LAMOTRIGINE SERPL-MCNC: 5.9 UG/ML (ref 2–15)

## 2020-07-16 ENCOUNTER — OFFICE VISIT (OUTPATIENT)
Dept: NEUROLOGY | Facility: CLINIC | Age: 35
End: 2020-07-16
Payer: MEDICARE

## 2020-07-16 VITALS
HEIGHT: 65 IN | SYSTOLIC BLOOD PRESSURE: 91 MMHG | HEART RATE: 73 BPM | DIASTOLIC BLOOD PRESSURE: 57 MMHG | WEIGHT: 143.31 LBS | BODY MASS INDEX: 23.88 KG/M2

## 2020-07-16 DIAGNOSIS — E34.9 HORMONAL DISORDER: ICD-10-CM

## 2020-07-16 DIAGNOSIS — G40.219 COMPLEX PARTIAL EPILEPSY WITH GENERALIZATION AND WITH INTRACTABLE EPILEPSY: Primary | ICD-10-CM

## 2020-07-16 PROBLEM — Z90.710 H/O VAGINAL HYSTERECTOMY: Status: ACTIVE | Noted: 2020-07-16

## 2020-07-16 PROCEDURE — 3008F BODY MASS INDEX DOCD: CPT | Mod: CPTII,S$GLB,, | Performed by: PSYCHIATRY & NEUROLOGY

## 2020-07-16 PROCEDURE — 3008F PR BODY MASS INDEX (BMI) DOCUMENTED: ICD-10-PCS | Mod: CPTII,S$GLB,, | Performed by: PSYCHIATRY & NEUROLOGY

## 2020-07-16 PROCEDURE — 99999 PR PBB SHADOW E&M-EST. PATIENT-LVL IV: ICD-10-PCS | Mod: PBBFAC,,, | Performed by: PSYCHIATRY & NEUROLOGY

## 2020-07-16 PROCEDURE — 99215 OFFICE O/P EST HI 40 MIN: CPT | Mod: S$GLB,,, | Performed by: PSYCHIATRY & NEUROLOGY

## 2020-07-16 PROCEDURE — 99999 PR PBB SHADOW E&M-EST. PATIENT-LVL IV: CPT | Mod: PBBFAC,,, | Performed by: PSYCHIATRY & NEUROLOGY

## 2020-07-16 PROCEDURE — 99215 PR OFFICE/OUTPT VISIT, EST, LEVL V, 40-54 MIN: ICD-10-PCS | Mod: S$GLB,,, | Performed by: PSYCHIATRY & NEUROLOGY

## 2020-07-16 RX ORDER — CLOBAZAM 20 MG/1
40 TABLET ORAL DAILY
Qty: 180 TABLET | Refills: 1 | Status: SHIPPED | OUTPATIENT
Start: 2020-07-16 | End: 2020-08-03 | Stop reason: DRUGHIGH

## 2020-07-16 NOTE — PATIENT INSTRUCTIONS
You came to neurology clinic because of your refractory epilepsy. You have drowsiness and dry mouth with your medications. However, you have had a decrease in the number of your seizures.     Please make an appointment with OBGYN. They can test your hormones to see if you need any additional medications to regulate things. Please avoid estrogens as they are pro-convulsant.     For now, continue:  Lamotrigine 150 in the morning, 300 at night   Clobazam 40mg in the morning   Sertraline 100mg     Get a pill box and use it. At the end of the month, all pills need be gone. Do whatever you need to do to accomplish this task.     If you have a seizure, and you have been taking your meds as prescribed, we will need to increase them or add a third medication. Your options would be:   Increase Lamotrigine to 300/300  Increase Clobazam to 40/40  Or start Zonisamide 400 at night     Talk about these options and we will decide when the time comes. Let's get an MRI with and without contrast to see if we can see the reason for your seizures. Also, I would like you to be evaluated by our neuropsychiatry colleagues to test your cognitive function.  After these steps, I will discuss you in Surgical Conference to see if you are a candidate for epilepsy surgery.    Per [place] law, no episodes of loss of consciousness for 6 months before you may drive again.  Please avoid dangerous situations.  For example, no baths/pools by yourself, no heights, no power tools.  Please wear a bike helmet.  If you have a single breakthrough seizure, please call the office and we will decide the next steps. If you have multiple seizures in a row and do not return back to baseline, 911 needs to be called.     Please return to clinic in 3 months after the MRI and neuropsych.     Charmaine Hennessy MD PhD  Neurology-Epilepsy  [facility].  [facility]
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
## 2020-07-29 ENCOUNTER — HOSPITAL ENCOUNTER (OUTPATIENT)
Dept: RADIOLOGY | Facility: OTHER | Age: 35
Discharge: HOME | End: 2020-07-29
Attending: PSYCHIATRY & NEUROLOGY
Payer: MEDICARE

## 2020-07-29 ENCOUNTER — OFFICE VISIT (OUTPATIENT)
Dept: OBSTETRICS AND GYNECOLOGY | Facility: CLINIC | Age: 35
End: 2020-07-29
Payer: MEDICARE

## 2020-07-29 VITALS
DIASTOLIC BLOOD PRESSURE: 58 MMHG | WEIGHT: 142.63 LBS | BODY MASS INDEX: 23.76 KG/M2 | SYSTOLIC BLOOD PRESSURE: 94 MMHG | HEIGHT: 65 IN

## 2020-07-29 DIAGNOSIS — G40.219 COMPLEX PARTIAL EPILEPSY WITH GENERALIZATION AND WITH INTRACTABLE EPILEPSY: ICD-10-CM

## 2020-07-29 DIAGNOSIS — E34.9 HORMONAL DISORDER: ICD-10-CM

## 2020-07-29 PROCEDURE — 3008F BODY MASS INDEX DOCD: CPT | Mod: CPTII,S$GLB,, | Performed by: OBSTETRICS & GYNECOLOGY

## 2020-07-29 PROCEDURE — 99999 PR PBB SHADOW E&M-EST. PATIENT-LVL III: CPT | Mod: PBBFAC,,, | Performed by: OBSTETRICS & GYNECOLOGY

## 2020-07-29 PROCEDURE — 99204 OFFICE O/P NEW MOD 45 MIN: CPT | Mod: S$GLB,,, | Performed by: OBSTETRICS & GYNECOLOGY

## 2020-07-29 PROCEDURE — 99204 PR OFFICE/OUTPT VISIT, NEW, LEVL IV, 45-59 MIN: ICD-10-PCS | Mod: S$GLB,,, | Performed by: OBSTETRICS & GYNECOLOGY

## 2020-07-29 PROCEDURE — 70553 MRI BRAIN EPILEPSY W W/O CONTRAST: ICD-10-PCS | Mod: 26,,, | Performed by: RADIOLOGY

## 2020-07-29 PROCEDURE — 99999 PR PBB SHADOW E&M-EST. PATIENT-LVL III: ICD-10-PCS | Mod: PBBFAC,,, | Performed by: OBSTETRICS & GYNECOLOGY

## 2020-07-29 PROCEDURE — 70553 MRI BRAIN STEM W/O & W/DYE: CPT | Mod: TC

## 2020-07-29 PROCEDURE — A9585 GADOBUTROL INJECTION: HCPCS | Performed by: PSYCHIATRY & NEUROLOGY

## 2020-07-29 PROCEDURE — 3008F PR BODY MASS INDEX (BMI) DOCUMENTED: ICD-10-PCS | Mod: CPTII,S$GLB,, | Performed by: OBSTETRICS & GYNECOLOGY

## 2020-07-29 PROCEDURE — 25500020 PHARM REV CODE 255: Performed by: PSYCHIATRY & NEUROLOGY

## 2020-07-29 PROCEDURE — 70553 MRI BRAIN STEM W/O & W/DYE: CPT | Mod: 26,,, | Performed by: RADIOLOGY

## 2020-07-29 RX ORDER — GADOBUTROL 604.72 MG/ML
6 INJECTION INTRAVENOUS
Status: COMPLETED | OUTPATIENT
Start: 2020-07-29 | End: 2020-07-29

## 2020-07-29 RX ADMIN — GADOBUTROL 6 ML: 604.72 INJECTION INTRAVENOUS at 01:07
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
## 2020-07-29 NOTE — LETTER
July 29, 2020      Charmaine Hennessy MD PhD  [address]  [address]  [facility] [address]           [facility] - OB/GYN 5th Floor  [address]  [place] [address]  Phone: [phone]          Patient: Mary Haywood   MR Number: [id number]   YOB: 1985   Date of Visit: 7/29/2020       Dear Dr. Charmaine Hennessy:    Thank you for referring Mary Haywood to me for evaluation. Attached you will find relevant portions of my assessment and plan of care.    If you have questions, please do not hesitate to call me. I look forward to following Mary Haywood along with you.    Sincerely,    Shima Soriano MD    Enclosure  CC:  No Recipients    If you would like to receive this communication electronically, please contact [email or web address] or [phone] to request more information on [facility] Link access.    For providers and/or their staff who would like to refer a patient to [facility], please contact us through our one-stop-shop provider referral line, [facility], at [phone].    If you feel you have received this communication in error or would no longer like to receive these types of communications, please e-mail [email or web address]

## 2020-08-03 ENCOUNTER — PATIENT MESSAGE (OUTPATIENT)
Dept: NEUROLOGY | Facility: CLINIC | Age: 35
End: 2020-08-03

## 2020-08-03 DIAGNOSIS — G40.219 COMPLEX PARTIAL EPILEPSY WITH GENERALIZATION AND WITH INTRACTABLE EPILEPSY: ICD-10-CM

## 2020-08-03 RX ORDER — CLOBAZAM 20 MG/1
40 TABLET ORAL 2 TIMES DAILY
Qty: 120 TABLET | Refills: 5 | Status: SHIPPED | OUTPATIENT
Start: 2020-08-03 | End: 2021-03-15

## 2020-08-03 NOTE — TELEPHONE ENCOUNTER
By herself at the house. Sitting on back porch. Walked back out on the porch, and all her stuff is gone. It was in the garage.  She has no memory of moving it. No loss of time. No loss of memory. Not 100% sure that she had a seizure but there is no other explanation    Currently taking:  Lamotrigine 150/300  Clobazam 40mg in the morning   Sertraline 100mg    Plan:    08/03/2020   Lamotrigine 150/300  Clobazam 40mg in the morning; 20mg at night   Sertraline 100mg    08/10/2020   Lamotrigine 150/300  Clobazam 40mg in the morning; 40mg at night   Sertraline 100mg    We discussed possible side effects.  She will call me with any issues.    Charmaine Hennessy MD PhD  Neurology-Epilepsy  [facility].  [facility]

## 2020-08-05 ENCOUNTER — LAB VISIT (OUTPATIENT)
Dept: LAB | Facility: HOSPITAL | Age: 35
End: 2020-08-05
Attending: OBSTETRICS & GYNECOLOGY
Payer: MEDICARE

## 2020-08-05 DIAGNOSIS — E34.9 HORMONAL DISORDER: ICD-10-CM

## 2020-08-05 PROCEDURE — 83520 IMMUNOASSAY QUANT NOS NONAB: CPT

## 2020-08-05 PROCEDURE — 82670 ASSAY OF TOTAL ESTRADIOL: CPT

## 2020-08-05 PROCEDURE — 83001 ASSAY OF GONADOTROPIN (FSH): CPT

## 2020-08-05 PROCEDURE — 84402 ASSAY OF FREE TESTOSTERONE: CPT

## 2020-08-05 PROCEDURE — 36415 COLL VENOUS BLD VENIPUNCTURE: CPT

## 2020-08-06 LAB
ESTRADIOL SERPL-MCNC: 58 PG/ML
FSH SERPL-ACNC: 8.3 MIU/ML

## 2020-08-09 LAB — TESTOST FREE SERPL-MCNC: 0.5 PG/ML

## 2020-08-10 LAB — MIS SERPL-MCNC: 1.1 NG/ML (ref 0.9–9.5)

## 2020-08-29 ENCOUNTER — PATIENT MESSAGE (OUTPATIENT)
Dept: NEUROLOGY | Facility: CLINIC | Age: 35
End: 2020-08-29

## 2020-08-29 DIAGNOSIS — G40.219 COMPLEX PARTIAL EPILEPSY WITH GENERALIZATION AND WITH INTRACTABLE EPILEPSY: Primary | ICD-10-CM

## 2020-08-31 NOTE — TELEPHONE ENCOUNTER
"Called Angelina. Adjusting to the new dosage of medicine but it is very tough. "At the bottom." No energy. Does nothing all day. Has not done any choirs in days. Bed by 8pm. Fatigued. Her father thinks that is depression. Does not want to hurt herself or others. Not hallucinating. No seizures that she is aware of. COVID lazy. Just doesn't care.    Levels Ordered.     Sertraline 100 at night   Clobazam 40/40   Lamotrigine 150/300    Not having seizures.     Would like to move forward with surgical eval. Neuropsych eval reordered.     Charmaine Hennessy MD PhD  Neurology-Epilepsy  [facility].  [facility]    "

## 2020-09-04 ENCOUNTER — LAB VISIT (OUTPATIENT)
Dept: FAMILY MEDICINE | Facility: CLINIC | Age: 35
End: 2020-09-04
Payer: MEDICARE

## 2020-09-04 ENCOUNTER — APPOINTMENT (OUTPATIENT)
Dept: LAB | Facility: HOSPITAL | Age: 35
End: 2020-09-04
Attending: PSYCHIATRY & NEUROLOGY
Payer: MEDICARE

## 2020-09-04 DIAGNOSIS — G40.219 COMPLEX PARTIAL EPILEPSY WITH GENERALIZATION AND WITH INTRACTABLE EPILEPSY: ICD-10-CM

## 2020-09-04 LAB
ALBUMIN SERPL BCP-MCNC: 4.2 G/DL (ref 3.5–5.2)
ALP SERPL-CCNC: 83 U/L (ref 55–135)
ALT SERPL W/O P-5'-P-CCNC: 11 U/L (ref 10–44)
ANION GAP SERPL CALC-SCNC: 9 MMOL/L (ref 8–16)
AST SERPL-CCNC: 15 U/L (ref 10–40)
BILIRUB SERPL-MCNC: 0.3 MG/DL (ref 0.1–1)
BUN SERPL-MCNC: <5 MG/DL (ref 6–20)
CALCIUM SERPL-MCNC: 8.4 MG/DL (ref 8.7–10.5)
CHLORIDE SERPL-SCNC: 101 MMOL/L (ref 95–110)
CO2 SERPL-SCNC: 25 MMOL/L (ref 23–29)
CREAT SERPL-MCNC: 0.8 MG/DL (ref 0.5–1.4)
EST. GFR  (AFRICAN AMERICAN): >60 ML/MIN/1.73 M^2
EST. GFR  (NON AFRICAN AMERICAN): >60 ML/MIN/1.73 M^2
GLUCOSE SERPL-MCNC: 113 MG/DL (ref 70–110)
POTASSIUM SERPL-SCNC: 3.8 MMOL/L (ref 3.5–5.1)
PROT SERPL-MCNC: 7 G/DL (ref 6–8.4)
SODIUM SERPL-SCNC: 135 MMOL/L (ref 136–145)

## 2020-09-04 PROCEDURE — 80053 COMPREHEN METABOLIC PANEL: CPT

## 2020-09-04 PROCEDURE — 80339 ANTIEPILEPTICS NOS 1-3: CPT

## 2020-09-04 PROCEDURE — 80175 DRUG SCREEN QUAN LAMOTRIGINE: CPT

## 2020-09-08 LAB
CLOBAZAM SERPL-MCNC: 714 NG/ML (ref 30–300)
LAMOTRIGINE SERPL-MCNC: 4.1 UG/ML (ref 2–15)
NORCLOBAZAM SERPL-MCNC: ABNORMAL NG/ML (ref 300–3000)

## 2020-09-09 ENCOUNTER — OFFICE VISIT (OUTPATIENT)
Dept: OBSTETRICS AND GYNECOLOGY | Facility: CLINIC | Age: 35
End: 2020-09-09
Payer: MEDICARE

## 2020-09-09 VITALS
HEIGHT: 65 IN | DIASTOLIC BLOOD PRESSURE: 56 MMHG | SYSTOLIC BLOOD PRESSURE: 98 MMHG | WEIGHT: 141.31 LBS | BODY MASS INDEX: 23.54 KG/M2

## 2020-09-09 DIAGNOSIS — N94.89 ADNEXAL MASS: ICD-10-CM

## 2020-09-09 DIAGNOSIS — Z01.419 ENCOUNTER FOR GYNECOLOGICAL EXAMINATION WITHOUT ABNORMAL FINDING: Primary | ICD-10-CM

## 2020-09-09 DIAGNOSIS — R33.9 INCOMPLETE EMPTYING OF BLADDER: ICD-10-CM

## 2020-09-09 LAB
BACTERIA #/AREA URNS AUTO: ABNORMAL /HPF
BILIRUB UR QL STRIP: NEGATIVE
CLARITY UR REFRACT.AUTO: ABNORMAL
COLOR UR AUTO: YELLOW
GLUCOSE UR QL STRIP: NEGATIVE
HGB UR QL STRIP: NEGATIVE
HYALINE CASTS UR QL AUTO: 0 /LPF
KETONES UR QL STRIP: NEGATIVE
LEUKOCYTE ESTERASE UR QL STRIP: NEGATIVE
MICROSCOPIC COMMENT: ABNORMAL
NITRITE UR QL STRIP: NEGATIVE
PH UR STRIP: 6 [PH] (ref 5–8)
PROT UR QL STRIP: ABNORMAL
RBC #/AREA URNS AUTO: 4 /HPF (ref 0–4)
SP GR UR STRIP: 1.02 (ref 1–1.03)
SQUAMOUS #/AREA URNS AUTO: 12 /HPF
URN SPEC COLLECT METH UR: ABNORMAL
WBC #/AREA URNS AUTO: 3 /HPF (ref 0–5)

## 2020-09-09 PROCEDURE — 87086 URINE CULTURE/COLONY COUNT: CPT

## 2020-09-09 PROCEDURE — G0101 PR CA SCREEN;PELVIC/BREAST EXAM: ICD-10-PCS | Mod: S$GLB,,, | Performed by: OBSTETRICS & GYNECOLOGY

## 2020-09-09 PROCEDURE — 99999 PR PBB SHADOW E&M-EST. PATIENT-LVL III: CPT | Mod: PBBFAC,,, | Performed by: OBSTETRICS & GYNECOLOGY

## 2020-09-09 PROCEDURE — G0101 CA SCREEN;PELVIC/BREAST EXAM: HCPCS | Mod: S$GLB,,, | Performed by: OBSTETRICS & GYNECOLOGY

## 2020-09-09 PROCEDURE — 99999 PR PBB SHADOW E&M-EST. PATIENT-LVL III: ICD-10-PCS | Mod: PBBFAC,,, | Performed by: OBSTETRICS & GYNECOLOGY

## 2020-09-09 PROCEDURE — 81001 URINALYSIS AUTO W/SCOPE: CPT

## 2020-09-09 NOTE — Clinical Note
Currently patient does not appear to be perimenopausal, still with normal hormonal function. Please advise for any further recommendations.
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
## 2020-09-10 LAB — BACTERIA UR CULT: NO GROWTH

## 2020-09-17 ENCOUNTER — PATIENT MESSAGE (OUTPATIENT)
Dept: NEUROLOGY | Facility: CLINIC | Age: 35
End: 2020-09-17

## 2020-09-18 ENCOUNTER — DOCUMENTATION ONLY (OUTPATIENT)
Dept: NEUROLOGY | Facility: HOSPITAL | Age: 35
End: 2020-09-18

## 2020-09-18 ENCOUNTER — DOCUMENTATION ONLY (OUTPATIENT)
Dept: NEUROLOGY | Facility: HOSPITAL | Age: 35
End: 2020-09-18
Payer: MEDICARE

## 2020-09-18 ENCOUNTER — TELEPHONE (OUTPATIENT)
Dept: NEUROLOGY | Facility: HOSPITAL | Age: 35
End: 2020-09-18

## 2020-09-18 DIAGNOSIS — G40.211 PARTIAL SYMPTOMATIC EPILEPSY WITH COMPLEX PARTIAL SEIZURES, INTRACTABLE, WITH STATUS EPILEPTICUS: ICD-10-CM

## 2020-09-18 DIAGNOSIS — R56.9 SEIZURES: Primary | ICD-10-CM

## 2020-09-18 PROCEDURE — 95957 PR EEG DIGITAL ANALYSIS: ICD-10-PCS | Mod: 26,GC,, | Performed by: PSYCHIATRY & NEUROLOGY

## 2020-09-18 PROCEDURE — 95957 EEG DIGITAL ANALYSIS: CPT | Mod: 26,GC,, | Performed by: PSYCHIATRY & NEUROLOGY
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
## 2020-09-18 NOTE — PROGRESS NOTES
EEG Source Imaging Report (CPT 53374)  ___________________________________________________________________________________________________________________    Patient: Mary Haywood    MR No.: [id number]    Date: 2020-09-16  ___________________________________________________________________________________________________________________    DIGITAL ANALYSIS OF EEG    Analysis software: [employer] ([place]    EEG Recording: EEG was recorded using the [employer] EEG software with a sampling frequency of 256 Hz.    EEG Review: Electrodes with high impedance or prominent contamination were not used for review and subsequent analysis. EEG data were reviewed with a filter band 1-70 Hz and a 60 Hz notch filter. EEG was displayed in a common average reference montage. Segments of EEG containing typical spike and seizure patterns were identified and visually checked for artifacts.      Imaging: 3D BEM head model and pial brain surface were reconstructed from a T1-weighted volumetric MRI of the patients head. EEG data were spatially co-registered with the image data.     Seizure Onset Analysis: Visual identification of the initial focal ictal rhythmic burst associated with clinical event. Filters were applied to narrow the band containing the frequency of the rhythmic burst such that the signal-to-noise ratio (SNR) was increased without altering the morphology of the rhythm. In order to increase the SNR, peaks of the rhythmic activity were checked for artifact contamination and averaged, or Independent Component Analysis was used to extract the defining voltage topography, either of which was then subjected to dipole source analysis.    Dion Analysis: A filter band of 1-30 Hz was used. If necessary, spikes of the same type were checked for artifact contamination and averaged in order to increase the SNR. Source localization was performed at the earliest point where the SNR was at least 10 and  the confidence ellipsoid of a fitted dipole was not larger than five cm3 within a sublobar region. Cortical current density analysis using orientational and source extension constraints was performed at the exact time of the calculated dipole source and values larger than 80 % of the maximum were displayed.  ___________________________________________________________________________________________________________________    RESULTS    EEG source of focal rhythmic burst at seizure onset: Not performed    EEG source of interictal spikes: See below    Date of EEG study utilized in this analysis: 2019-07-03  # of spikes reviewed: 42    Description of dipoles: Generated based on signal data from both electrodes distributions: (a) standard scalp 10/20 (b) subtemporal chain F10/FT10/T10/TP10/P10 on right and F9/FT9/T9/TP9/P9 on left.    The dipoles were projected on the patients brain MRI.     Heat map(s) were generated from the same signal data.    Sample images:  Individual spikes in blue and green color  Average of spikes in purple and orange color      ___________________________________________________________________________________________________________________    INTERPRETATION    Independent bilateral temporal diploles suggesting bilateral pathology.     Charmaine Hennessy MD PhD  Neurology-Epilepsy  [facility].  [facility]

## 2020-09-22 ENCOUNTER — PATIENT MESSAGE (OUTPATIENT)
Dept: NEUROLOGY | Facility: CLINIC | Age: 35
End: 2020-09-22

## 2020-09-22 DIAGNOSIS — G40.219 COMPLEX PARTIAL EPILEPSY WITH GENERALIZATION AND WITH INTRACTABLE EPILEPSY: Primary | ICD-10-CM

## 2020-09-23 ENCOUNTER — PATIENT MESSAGE (OUTPATIENT)
Dept: NEUROLOGY | Facility: CLINIC | Age: 35
End: 2020-09-23

## 2020-09-23 ENCOUNTER — TELEPHONE (OUTPATIENT)
Dept: NEUROLOGY | Facility: CLINIC | Age: 35
End: 2020-09-23

## 2020-09-23 NOTE — TELEPHONE ENCOUNTER
Message sent to get NP scheduled asap. Also message left for nuclear med to schedule PET scan-interictal. Message sent to patient via portal to keep her informed.

## 2020-09-23 NOTE — TELEPHONE ENCOUNTER
PET to guide surgical planning     Charmaine Hennessy MD PhD  Neurology-Epilepsy  [facility].  [facility]

## 2020-09-24 ENCOUNTER — TELEPHONE (OUTPATIENT)
Dept: NEUROLOGY | Facility: CLINIC | Age: 35
End: 2020-09-24

## 2020-10-05 ENCOUNTER — HOSPITAL ENCOUNTER (OUTPATIENT)
Dept: RADIOLOGY | Facility: HOSPITAL | Age: 35
Discharge: HOME | End: 2020-10-05
Attending: OBSTETRICS & GYNECOLOGY
Payer: MEDICARE

## 2020-10-05 DIAGNOSIS — N94.89 ADNEXAL MASS: ICD-10-CM

## 2020-10-05 PROCEDURE — 76856 US PELVIS COMPLETE NON OB: ICD-10-PCS | Mod: 26,,, | Performed by: RADIOLOGY

## 2020-10-05 PROCEDURE — 76856 US EXAM PELVIC COMPLETE: CPT | Mod: TC

## 2020-10-05 PROCEDURE — 76856 US EXAM PELVIC COMPLETE: CPT | Mod: 26,,, | Performed by: RADIOLOGY

## 2020-10-06 ENCOUNTER — OFFICE VISIT (OUTPATIENT)
Dept: NEUROSURGERY | Facility: CLINIC | Age: 35
End: 2020-10-06
Payer: MEDICARE

## 2020-10-06 ENCOUNTER — TELEPHONE (OUTPATIENT)
Dept: NEUROSURGERY | Facility: CLINIC | Age: 35
End: 2020-10-06

## 2020-10-06 VITALS
HEIGHT: 65 IN | WEIGHT: 140.13 LBS | HEART RATE: 69 BPM | DIASTOLIC BLOOD PRESSURE: 47 MMHG | TEMPERATURE: 97 F | BODY MASS INDEX: 23.35 KG/M2 | SYSTOLIC BLOOD PRESSURE: 101 MMHG

## 2020-10-06 DIAGNOSIS — G40.109 TEMPORAL LOBE EPILEPSY: Primary | ICD-10-CM

## 2020-10-06 DIAGNOSIS — G40.219 COMPLEX PARTIAL EPILEPSY WITH GENERALIZATION AND WITH INTRACTABLE EPILEPSY: Primary | ICD-10-CM

## 2020-10-06 PROCEDURE — 3008F PR BODY MASS INDEX (BMI) DOCUMENTED: ICD-10-PCS | Mod: CPTII,S$GLB,, | Performed by: NEUROLOGICAL SURGERY

## 2020-10-06 PROCEDURE — 99999 PR PBB SHADOW E&M-EST. PATIENT-LVL III: CPT | Mod: PBBFAC,,, | Performed by: NEUROLOGICAL SURGERY

## 2020-10-06 PROCEDURE — 99999 PR PBB SHADOW E&M-EST. PATIENT-LVL III: ICD-10-PCS | Mod: PBBFAC,,, | Performed by: NEUROLOGICAL SURGERY

## 2020-10-06 PROCEDURE — 99215 OFFICE O/P EST HI 40 MIN: CPT | Mod: S$GLB,,, | Performed by: NEUROLOGICAL SURGERY

## 2020-10-06 PROCEDURE — 99215 PR OFFICE/OUTPT VISIT, EST, LEVL V, 40-54 MIN: ICD-10-PCS | Mod: S$GLB,,, | Performed by: NEUROLOGICAL SURGERY

## 2020-10-06 PROCEDURE — 3008F BODY MASS INDEX DOCD: CPT | Mod: CPTII,S$GLB,, | Performed by: NEUROLOGICAL SURGERY
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
## 2020-10-06 NOTE — LETTER
October 6, 2020      Charmaine Hennessy MD PhD  [address]  [address]  [facility] [address]           [facility] - Neurosurgery 7th Fl  [address]  [facility] [address]  Phone: [phone]  Fax: [phone]          Patient: Mary Haywood   MR Number: [id number]   YOB: 1985   Date of Visit: 10/6/2020       Dear Dr. Charmaine Hennessy:    Thank you for referring Mary Haywood to me for evaluation. Attached you will find relevant portions of my assessment and plan of care.    If you have questions, please do not hesitate to call me. I look forward to following Mary Haywood along with you.    Sincerely,    Nasra Wetzel MD    Enclosure  CC:  No Recipients    If you would like to receive this communication electronically, please contact [email or web address] or [phone] to request more information on [employer] access.    For providers and/or their staff who would like to refer a patient to [facility], please contact us through our one-stop-shop provider referral line, [facility], at [phone].    If you feel you have received this communication in error or would no longer like to receive these types of communications, please e-mail [email or web address]

## 2020-10-09 ENCOUNTER — PATIENT MESSAGE (OUTPATIENT)
Dept: NEUROSURGERY | Facility: CLINIC | Age: 35
End: 2020-10-09

## 2020-10-13 ENCOUNTER — TELEPHONE (OUTPATIENT)
Dept: NEUROLOGY | Facility: CLINIC | Age: 35
End: 2020-10-13

## 2020-10-13 ENCOUNTER — HOSPITAL ENCOUNTER (OUTPATIENT)
Dept: RADIOLOGY | Facility: HOSPITAL | Age: 35
Discharge: HOME | End: 2020-10-13
Attending: PSYCHIATRY & NEUROLOGY
Payer: MEDICARE

## 2020-10-13 DIAGNOSIS — G40.219 COMPLEX PARTIAL EPILEPSY WITH GENERALIZATION AND WITH INTRACTABLE EPILEPSY: ICD-10-CM

## 2020-10-13 LAB — POCT GLUCOSE: 78 MG/DL (ref 70–110)

## 2020-10-13 PROCEDURE — 78608 NM PET BRAIN: ICD-10-PCS | Mod: 26,PS,, | Performed by: RADIOLOGY

## 2020-10-13 PROCEDURE — 78608 BRAIN IMAGING (PET): CPT | Mod: TC

## 2020-10-13 PROCEDURE — 78608 BRAIN IMAGING (PET): CPT | Mod: 26,PS,, | Performed by: RADIOLOGY

## 2020-10-14 ENCOUNTER — OFFICE VISIT (OUTPATIENT)
Dept: NEUROLOGY | Facility: CLINIC | Age: 35
End: 2020-10-14
Payer: MEDICARE

## 2020-10-14 ENCOUNTER — PATIENT MESSAGE (OUTPATIENT)
Dept: NEUROLOGY | Facility: CLINIC | Age: 35
End: 2020-10-14

## 2020-10-14 ENCOUNTER — INITIAL CONSULT (OUTPATIENT)
Dept: NEUROLOGY | Facility: CLINIC | Age: 35
End: 2020-10-14
Payer: MEDICARE

## 2020-10-14 DIAGNOSIS — G40.109 TEMPORAL LOBE EPILEPSY: Primary | ICD-10-CM

## 2020-10-14 DIAGNOSIS — F06.70 MILD NEUROCOGNITIVE DISORDER DUE TO ANOTHER MEDICAL CONDITION: ICD-10-CM

## 2020-10-14 DIAGNOSIS — F43.10 PTSD (POST-TRAUMATIC STRESS DISORDER): ICD-10-CM

## 2020-10-14 DIAGNOSIS — R56.9 SEIZURES: ICD-10-CM

## 2020-10-14 PROCEDURE — 96132 PR NEUROPSYCHOLOGIC TEST EVAL SVCS, 1ST HR: ICD-10-PCS | Mod: S$GLB,,, | Performed by: CLINICAL NEUROPSYCHOLOGIST

## 2020-10-14 PROCEDURE — 96138 PSYCL/NRPSYC TECH 1ST: CPT | Mod: S$GLB,,, | Performed by: CLINICAL NEUROPSYCHOLOGIST

## 2020-10-14 PROCEDURE — 99999 PR PBB SHADOW E&M-EST. PATIENT-LVL I: CPT | Mod: PBBFAC,,, | Performed by: CLINICAL NEUROPSYCHOLOGIST

## 2020-10-14 PROCEDURE — 90791 PR PSYCHIATRIC DIAGNOSTIC EVALUATION: ICD-10-PCS | Mod: S$GLB,,, | Performed by: CLINICAL NEUROPSYCHOLOGIST

## 2020-10-14 PROCEDURE — 99499 NO LOS: ICD-10-PCS | Mod: S$GLB,,, | Performed by: CLINICAL NEUROPSYCHOLOGIST

## 2020-10-14 PROCEDURE — 96132 NRPSYC TST EVAL PHYS/QHP 1ST: CPT | Mod: S$GLB,,, | Performed by: CLINICAL NEUROPSYCHOLOGIST

## 2020-10-14 PROCEDURE — 96138 PR PSYCH/NEUROPSYCH TEST ADMIN/SCORING, BY TECH, 2+ TESTS, 1ST 30 MIN: ICD-10-PCS | Mod: S$GLB,,, | Performed by: CLINICAL NEUROPSYCHOLOGIST

## 2020-10-14 PROCEDURE — 99999 PR PBB SHADOW E&M-EST. PATIENT-LVL I: ICD-10-PCS | Mod: PBBFAC,,, | Performed by: CLINICAL NEUROPSYCHOLOGIST

## 2020-10-14 PROCEDURE — 96139 PSYCL/NRPSYC TST TECH EA: CPT | Mod: S$GLB,,, | Performed by: CLINICAL NEUROPSYCHOLOGIST

## 2020-10-14 PROCEDURE — 99499 UNLISTED E&M SERVICE: CPT | Mod: S$GLB,,, | Performed by: CLINICAL NEUROPSYCHOLOGIST

## 2020-10-14 PROCEDURE — 90791 PSYCH DIAGNOSTIC EVALUATION: CPT | Mod: S$GLB,,, | Performed by: CLINICAL NEUROPSYCHOLOGIST

## 2020-10-14 PROCEDURE — 96139 PR PSYCH/NEUROPSYCH TEST ADMIN/SCORING, BY TECH, 2+ TESTS, EA ADDTL 30 MIN: ICD-10-PCS | Mod: S$GLB,,, | Performed by: CLINICAL NEUROPSYCHOLOGIST

## 2020-10-14 NOTE — PROGRESS NOTES
"Outpatient Neuropsychological Evaluation    Referral Information  Name: Mary Haywood  MRN: [id number]  : 1985  Age: 35 y.o.  Handedness: Right  Race: White  Gender: female  Referring Provider: Charmaine Hennessy Md Phd  [address] [address] [address]  [address]  [place] [address]  Referral Reason/Medical Necessity: Cognitive difficulties with neuropsychological evaluation ordered by Neurology to characterize cognitive status, differential diagnosis, and updated treatment recommendations.   Billing: See at the end of the report as billing and coding has changed in 2019.  Consent: The patient expressed an understanding of the purpose of the evaluation and consented to all procedures.    DIAGNOSTIC IMPRESSIONS/TREATMENT PLAN:   Review below for onset/course of cognitive symptoms along with functional status and pertinent medical history. Results, unfortunately, are pending. Patient could not complete testing today. Follow-up testing scheduled for 10/28/20. Review that encounter for full report, testing, and plan.    Problem List Items Addressed This Visit        Neuro    Temporal lobe epilepsy - Primary    Mild 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       Psychiatric    PTSD (post-traumatic stress disorder)      Other Visit Diagnoses     Seizures               ELA Cates II, Ph.D., ABPP-CN  Board Certified Clinical Neuropsychologist  Co-Director, Cognitive Disorders and Brain Health Program  Department of Neurology and Neurosciences  [facility]    HPI/CURRENT SYMPTOMS   Active problems noted below.  [Onset/Course of Cognitive Symptoms]:  Ms. Haywood is being assessed as a part of her pre-surgical workup for potential epilepsy surgery. In , seizures started and cognitive sxs started at same. Short-term memory is her primary issue (forgetting conversations, activities she's previously participated in) and has worsened over the past 8-years.     She notices that some "information " "will stick in her memory" but "has no idea why it sticks when other information doesn't." For compensatory strategies, she tries to use lists/notebooks but loses the paper. She doesn't use a calendar and will remember she has an appointment but never when the appointment is. Cues do trigger her recall.      Cognitive Sxs:  · Attention: Longstanding attention trouble since childhood and is worse now   · Mental Speed: None  · Memory: Primary symptom is short-term memory  · Language: No identified trouble  · Visuospatial/Perceptual: No identified trouble  · Executive Functioning: No identified trouble    Neuropsychiatric Sxs:  Mood:   Depression Anxiety Other   · "80% of the time, I'm okay" and "20% of the time I cocoon myself and don't want to be messed with for no reason"  · Zoloft is helpful (taking above from 50% to 20%) · Some mild normative sxs related to her children · NA     Neurovegetative:   Sleep Appetite Energy    "Perfectly fine"  "Zero" for the past year but "I know I have to eat"   Variable     Behavioral Concerns: Does have behavioral issues when she has seizures  Delusions/Hallucinations: None  SI/HI: None    Physical Sxs:  Motor Sensory Pain    NA  NA  NA       Current Functioning (I/ADLs):  ADLs  Self-Care Eating Safety Other   Independent Independent Independent NA     Instrumental IADLs:   Driving Medications/Health Household Finances   -Does not drive -Independent -Independent -On disability and father provides support       PERTINENT BACKGROUND INFORMATION     Family History   Adopted: Yes   Family history unknown: Yes     Family Neurologic History: Negative for heritable risk factors  Family Psychiatric History: Negative for heritable risk factors    Developmental/Academic Hx:  Developmental: ?Maternal drug abuse No gestational or later developmental concerns.  Academic:  · Learning Difficulties: None  · Attention/Behavioral Difficulties:   · Diagnosed with ADHD and placed on Ritalin and " "Wellbutrin. She had a seizure at 12yo and stopped Wellbutrin. She is uncertain if Ritalin helped. With high motivation and pressure, her attention/focus improved.   · Diagnosed with ODD and had various episodes of treatment.   · Educational Attainment:  · 2002: Senior Year was pulled out from boarding school due to financial issues. She decided to get her GED instead of finish the public school.   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      MEDICAL HISTORY     Patient Active Problem List   Diagnosis    Temporal lobe epilepsy    Complex partial epilepsy with generalization and with intractable epilepsy    Anxiety and depression    PTSD (post-traumatic stress disorder)    HSV-1 infection    HSV-2 infection    H/O vaginal hysterectomy    Hormonal disorder    Partial symptomatic epilepsy with complex partial seizures, intractable, with status epilepticus    Mild 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       Past Medical History:   Diagnosis Date    Anxiety and depression 7/5/2019    Complex partial epilepsy with generalization and with intractable epilepsy 2012         HSV-1 infection     HSV-2 infection     PTSD (post-traumatic stress disorder)        Past Surgical History:   Procedure Laterality Date    HYSTERECTOMY  2011    endometriosis    OOPHORECTOMY  2011    ovarian cyst       Pertinent Neurologic History  TBIs  None   Seizures  Temporal Lobe Epilepsy (review current Epilepsy " notes)   Pre-surgical Knowledge: Good description of Dr. Hennessy's recommendation and plan. Unsure about success rate. Knew she'd have to take meds forever.     CVAs  None   Movement Dis.  None   Other  None         Pertinent Labs Impacting Cognition  No results found for: [id number]  No results found for: RPR  No results found for: FOLATE  No results found for: TSH, N1YFWQT, Z4CEVGP, THYROIDAB  No results found for: LABA1C, HGBA1C  No results found for: HIV1X2, IHP75HPUV    Neurodiagnostics  Year Diagnostic Results[Copied from Report]   2020   MRI FINDINGS:  Intracranial Compartment:     Ventricles and sulci are normal in size for age without evidence of hydrocephalus. No extra-axial blood or fluid collections.     Within the anterolateral aspect of the right temporal lobe there is a 1 cm complex signal lesion that demonstrates areas of both T1 and T2 signal hyperintensity centrally and hypointense signal peripherally and is associated with blooming artifact on the gradient echo sequence.  After contrast administration, there is some enhancement which communicates with the leptomeninges along the periphery of the lesion.  Findings are suggestive of a benign cavernous malformation.  The brain parenchyma and CSF spaces are otherwise unremarkable.     Hippocampi have normal and symmetric architecture and signal.     Normal vascular flow voids are preserved.     Skull/Extracranial Contents (limited evaluation): Bone marrow signal intensity is normal.     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  Remaining brain parenchyma demonstrates symmetric metabolic activity.     [facility], which also identifies a more focal region of decreased metabolic activity corresponding with cavernous malformation described on recent MRI brain.     Impression:     Decreased uptake in the right temporal lobe by visual inspection and more focally decreased uptake at the right temporal cavernous malformation by quantitative analysis compatible with interictal epileptogenic focus.     I, Ashish Luna MD, attest that I reviewed and interpreted the images.     Electronically signed by resident: Robert Cantrell  Date:                                            10/13/2020  Time:                                           13:25     Electronically signed by: Ashish Luna  Date:                                            10/14/2020  Time:                                           11:19 2019   Video EEG IMPRESSION:  Markedly abnormal study consistent with bitemporal, independent focal onset epilepsy. Patient has abundant bilateral, independent interictal epileptiform discharges. A total of 13 electroclinical seizures are captured, 12 with left hemispheric onset and 1 with right hemispheric onset.      Power Collier MD       Current Outpatient Medications:     cloBAZam (ONFI) 20 mg Tab, Take 2 tablets (40 mg total) by mouth 2 (two) times daily., Disp: 120 tablet, Rfl: 5    lamoTRIgine (LAMICTAL) 150 MG Tab, Take 3 tablets (450 mg total) by mouth once daily. (300mg in the morning, 150mg at night), Disp: 270 tablet, Rfl: 3    sertraline (ZOLOFT) 100 MG tablet, Take 1 tablet (100 mg total) by mouth once daily., Disp: 90 tablet, Rfl: 3    valACYclovir (VALTREX) 1000 MG tablet, Take 1,000 mg by mouth 2 (two) times daily. Taking prn, Disp: , Rfl: 0    Relevant Psychiatric History:  · Childhood: Review 10/24/19 psychiatry notes for extensive history  · Adult:  · Current: No treatment  · Previous: Aside from 2019 evaluation at [facility] for depression, no treatment.  "  · Substance Use:   · Cigarettes: 1ppd daily for 20-years  · Marijuana: Nightly (3-hits) for appetite  · Alcohol: Rarely    Social History     Tobacco Use    Smoking status: Current Every Day Smoker     Packs/day: 1.00     Types: Cigarettes     Start date: 7/29/1998    Smokeless tobacco: Never Used   Substance and Sexual Activity    Alcohol use: Not Currently     Frequency: Monthly or less     Drinks per session: 3 or 4    Drug use: Yes     Types: Marijuana    Sexual activity: Not Currently     Partners: Male     Birth control/protection: See Surgical Hx, Surgical     Comment:        MENTAL STATUS AND OBSERVATIONS:     APPEARANCE: Casually dressed and adequate grooming/hygiene.   ALERTNESS/ORIENTATION: Attentive and alert. Fully oriented (x5) to time and place  GAIT: Unremarkable  MOTOR MOVEMENTS/MANNERISMS: Unremarkable  SPEECH/LANGUAGE: Normal in rhythm, tone, and volume. Slow rate of speech. Mild significant word finding difficulty noted. Expressive and receptive language was normal.  STATED MOOD/AFFECT: The patients stated mood was "anxious." Affect was congruent with stated mood.   INTERPERSONAL BEHAVIOR: Rapport was quickly and easily established   SUICIDALITY/HOMICIDALITY: Denied  HALLUCINATIONS/DELUSIONS: None evidenced or endorsed  THOUGHT PROCESSES: Thoughts seemed logical and goal-directed.    TEST TAKING BEHAVIOR and VALIDITY: Freestanding and embedded performance validity measures and observation of effort were suggestive of adequate engagement. Unfortunately, the patient had a lot of trouble remaining for the whole test session. She became fatigued after about 120-minutes. We broke for lunch but she could not continue. Results are pending the remainder of tests.     PROCEDURES/TESTS ADMINISTERED   In addition to performing a review of pertinent medical records, reviewing limits to confidentiality, conducting a clinical interview, and explaining procedures, the following measures were " administered: [employer] (ACS) Test of Pre-Morbid Functioning (TOPF), Green's MSVT, [place] Verbal Learning Test-Second Edition (CVLT-2), Wechsler Memory Scale-Fourth Edition (WMS-IV) Logical Memory and Visual Reproduction subtests, Grooved Pegboard (GPT; Kristin et al., 2004),     Rescheduled for 10/28/20: Wechsler Adult Intelligence Scale-Fourth Edition (WAIS-IV Core 9), Trail Making Test (TMT-A&B; Kristin et al., 2004), Verbal Fluency Test(FAS/Animals; Kristin et al., 2004), NAB Naming Test, Saul Complex Figure Copy Trial(Saul CFT),  Personality Assessment Inventory (RIZWAN) [place] Card Sorting Test (WCST-128)    BILLING     Service Description CPT Code Minutes Units   Psychiatric diagnostic evaluation by physician [id number] [phone]   Neurobehavioral status exam by physician [id number]  0   Each additional hour by physician [id number]  0   Test Evaluation Services --  --   Neuropsychological testing evaluation services by physician [id number] [phone]   Each additional hour by physician [id number]     Test Administration and Scoring --  --   Psychological or neuropsychological test administration and scoring by physician [id number]  0   Each additional 30 minutes by physician [id number]  0   Psychological or neuropsychological test administration and scoring by technician [id number] [phone]   Each additional 30 minutes by technician [phone]

## 2020-10-16 ENCOUNTER — TELEPHONE (OUTPATIENT)
Dept: NEUROSURGERY | Facility: CLINIC | Age: 35
End: 2020-10-16

## 2020-10-16 DIAGNOSIS — G40.919 INTRACTABLE SEIZURES: ICD-10-CM

## 2020-10-16 DIAGNOSIS — Z78.9 KNOWN HEALTH PROBLEMS: NONE: ICD-10-CM

## 2020-10-16 DIAGNOSIS — Z01.818 PRE-OP TESTING: Primary | ICD-10-CM

## 2020-10-20 ENCOUNTER — PATIENT MESSAGE (OUTPATIENT)
Dept: NEUROLOGY | Facility: CLINIC | Age: 35
End: 2020-10-20

## 2020-10-20 DIAGNOSIS — M79.609 PAIN IN EXTREMITY, UNSPECIFIED EXTREMITY: ICD-10-CM

## 2020-10-20 DIAGNOSIS — Z01.818 PREOPERATIVE TESTING: Primary | ICD-10-CM

## 2020-10-20 DIAGNOSIS — R56.9 SEIZURES: ICD-10-CM

## 2020-10-20 NOTE — PRE-PROCEDURE INSTRUCTIONS
Patient stated has not had any problem with anesthesia in the past. Will need medical optimization with Dr. Rodríguez per .Will also need poc appt and labs. Our  will call tos et up these appts. Patient stated she is a smoker, discussed trying to stop smoking one week prior to surgery. She also said she uses marijuana.   Preop instructions given. Hold aspirin, aspirin containing products, nsaids(aleve, advil, motrin, ibuprofen, naprosyn, naproxen, voltaren, diclofenac), vitamins and supplements one week prior to surgery.    (sent to my chart)

## 2020-10-20 NOTE — ANESTHESIA PAT ROS NOTE
10/20/2020  Mary Haywood is a 35 y.o., female.      Pre-op Assessment          Review of Systems  Anesthesia Hx:  No problems with previous Anesthesia  Family Hx of Anesthesia complications: Family Anesthesia Complications are Pt adopted-unsure of family history  Denies Personal Hx of Anesthesia complications.   Social:  Smoker, No Alcohol Use  Tobacco Use:  of cigarette, 1 pack per day Alcohol Use:  Illicit Drug Use: Types of drugs include Marijuana,   Hematology/Oncology:  Hematology Normal   Oncology Normal     EENT/Dental:   Jaw Problems:  Clicking (TMJ) and Limited mouth opening   Pulmonary:  Pulmonary Normal  Denies Shortness of breath.  Denies Recent URI.    Renal/:  Renal/ Normal     Hepatic/GI:  Hepatic/GI Normal    Neurological:  Seizure Disorder , Most recent seizure occurred 1-6  months ago , frequency is occasional , anticonvulsant levels are within therapeutic range. Lamotrigine level 9/4/20   Endocrine:  Endocrine Normal    Psych:  Anxiety Disorder and Past Hx of Anxiety.  Attention Deficit Disorder. Psychotic Disorder and Post-traumatic Stress Disorder.          Physical Exam  General:  Well nourished    Airway/Jaw/Neck:  Airway Findings: Mouth Opening: Normal Tongue: Normal  Jaw/Neck Findings:  Neck ROM: Normal ROM      Dental:  Dental Findings: (broken tooth bottom right ) In tact   Chest/Lungs:  Chest/Lungs Findings: Clear to auscultation, Normal Respiratory Rate     Heart/Vascular:  Heart Findings: Rate: Normal        Mental Status:  Mental Status Findings:  Cooperative, Alert and Oriented       10/23/20 labs pending. Dr Rodríguez clearance noted:  Preoperative cardiac risk assessment-  The patient does not have any active cardiac conditions . Revised cardiac risk index predictors- 0---.Functional capacity is more than 4 Mets. She will be undergoing a Neuro  procedure that  carries a Moderate Risk risk      Risk of a major Cardiac event ( Defined as death, myocardial infarction, or cardiac arrest at 30 days after noncardiac surgery), based on RCRI score      -3.9%            No further cardiac work up is indicated prior to proceeding with the surgery          Orders Placed This Encounter    Hemoglobin A1C    Ambulatory referral/consult to Smoking Cessation Program         American Society of Anesthesiologists Physical status classification ( ASA ) class: 3     Postoperative pulmonary complication risk assessment:      ARISCAT ( Canet) risk index- risk class -  Low, if duration of surgery is under 3 hours, intermediate, if duration of surgery is over 3 hours      [facility] Respiratory failure index- percentage risk of respiratory failure: 1.8 %     10/26/20 Lab results noted. Inbasket sent to surgeon to remind to schedule Covid test, pt aware.    Anesthesia Assessment: Preoperative EQUATION    Planned Procedure: Procedure(s) (LRB):  CRANIOTOMY, USING FRAMELESS STEREOTAXY FOR  RIGHT SIDED RESECTION OF CAVERNOMA (Right)  Requested Anesthesia Type:General  Surgeon: Nasra Wetzel MD  Service: Neurosurgery  Known or anticipated Date of Surgery:11/9/2020    Surgeon notes: reviewed    Electronic QUestionnaire Assessment completed via nurse interview with patient.        Triage considerations:       Previous anesthesia records:No problems and Not available    Last PCP note: NO PCP  Subspecialty notes: Neurology, Neurosurgery, Psychiatry, OB/GYN    Other important co-morbidities:PER Epic:  Smoker and INTRACTABLE SEIZURES      Tests already available:  Available tests,  within 3 months , 3-6 months ago , [facility] .9/9/2020 UA, MICRO UA, 9/4/2020CMP, 10/13/2020 NM PET BRAIN FDG, 7/29/2020 MRI BRAIN EPILEPSY W W/O CONTRAST             Instructions given. (See in Nurse's note)    Optimization:  Anesthesia Preop Clinic Assessment  Indicated    Medical Opinion Indicated           Plan:     Testing:  CBC, CMP, PT/INR, T&C and T&S   Pre-anesthesia  visit       Visit focus: concerns in complex and/or prolonged anesthesia     Consultation:IM Perioperative Hospitalist     Patient  has previously scheduled Medical Appointment:10/21 MRI BRAIN STEALTH    Navigation: Tests Scheduled. TBD             Consults scheduled.TBD             Results will be tracked by Preop Clinic.  Thao Martinez RN BSN

## 2020-10-22 ENCOUNTER — TELEPHONE (OUTPATIENT)
Dept: NEUROLOGY | Facility: CLINIC | Age: 35
End: 2020-10-22

## 2020-10-22 PROBLEM — F06.70 MILD NEUROCOGNITIVE DISORDER DUE TO ANOTHER MEDICAL CONDITION: Status: ACTIVE | Noted: 2020-10-22

## 2020-10-23 ENCOUNTER — ANESTHESIA EVENT (OUTPATIENT)
Dept: SURGERY | Facility: HOSPITAL | Age: 35
DRG: 026 | End: 2020-10-23
Payer: MEDICARE

## 2020-10-23 ENCOUNTER — INITIAL CONSULT (OUTPATIENT)
Dept: INTERNAL MEDICINE | Facility: CLINIC | Age: 35
End: 2020-10-23
Payer: MEDICARE

## 2020-10-23 ENCOUNTER — HOSPITAL ENCOUNTER (OUTPATIENT)
Dept: PREADMISSION TESTING | Facility: HOSPITAL | Age: 35
Discharge: HOME | End: 2020-10-23
Attending: NEUROLOGICAL SURGERY
Payer: MEDICARE

## 2020-10-23 ENCOUNTER — HOSPITAL ENCOUNTER (OUTPATIENT)
Dept: RADIOLOGY | Facility: HOSPITAL | Age: 35
Discharge: HOME | End: 2020-10-23
Attending: NEUROLOGICAL SURGERY
Payer: MEDICARE

## 2020-10-23 VITALS
BODY MASS INDEX: 23.49 KG/M2 | WEIGHT: 141 LBS | SYSTOLIC BLOOD PRESSURE: 89 MMHG | OXYGEN SATURATION: 97 % | HEART RATE: 58 BPM | DIASTOLIC BLOOD PRESSURE: 62 MMHG | HEIGHT: 65 IN | TEMPERATURE: 98 F

## 2020-10-23 DIAGNOSIS — R73.9 HYPERGLYCEMIA: ICD-10-CM

## 2020-10-23 DIAGNOSIS — K59.00 CONSTIPATION, UNSPECIFIED CONSTIPATION TYPE: ICD-10-CM

## 2020-10-23 DIAGNOSIS — D75.89 MACROCYTOSIS WITHOUT ANEMIA: ICD-10-CM

## 2020-10-23 DIAGNOSIS — G40.219 COMPLEX PARTIAL EPILEPSY WITH GENERALIZATION AND WITH INTRACTABLE EPILEPSY: ICD-10-CM

## 2020-10-23 DIAGNOSIS — E87.1 HYPONATREMIA: ICD-10-CM

## 2020-10-23 DIAGNOSIS — Z01.818 PREOP EXAMINATION: Primary | ICD-10-CM

## 2020-10-23 DIAGNOSIS — G40.919 INTRACTABLE EPILEPSY WITHOUT STATUS EPILEPTICUS, UNSPECIFIED EPILEPSY TYPE: ICD-10-CM

## 2020-10-23 DIAGNOSIS — F41.9 ANXIETY AND DEPRESSION: ICD-10-CM

## 2020-10-23 DIAGNOSIS — R42 POSTURAL DIZZINESS: ICD-10-CM

## 2020-10-23 DIAGNOSIS — F17.200 CURRENTLY SMOKES TOBACCO: ICD-10-CM

## 2020-10-23 DIAGNOSIS — F32.A ANXIETY AND DEPRESSION: ICD-10-CM

## 2020-10-23 DIAGNOSIS — Z90.710 H/O VAGINAL HYSTERECTOMY: ICD-10-CM

## 2020-10-23 DIAGNOSIS — R09.89 PALPABLE ABDOMINAL AORTA: ICD-10-CM

## 2020-10-23 DIAGNOSIS — F06.70 MILD NEUROCOGNITIVE DISORDER DUE TO ANOTHER MEDICAL CONDITION: ICD-10-CM

## 2020-10-23 PROCEDURE — 70552 MRI BRAIN STEM W/DYE: CPT | Mod: 26,,, | Performed by: RADIOLOGY

## 2020-10-23 PROCEDURE — 70552 MRI BRAIN STEM W/DYE: CPT | Mod: TC

## 2020-10-23 PROCEDURE — 99204 PR OFFICE/OUTPT VISIT, NEW, LEVL IV, 45-59 MIN: ICD-10-PCS | Mod: S$GLB,,, | Performed by: HOSPITALIST

## 2020-10-23 PROCEDURE — 3008F BODY MASS INDEX DOCD: CPT | Mod: CPTII,S$GLB,, | Performed by: HOSPITALIST

## 2020-10-23 PROCEDURE — 99999 PR PBB SHADOW E&M-EST. PATIENT-LVL III: ICD-10-PCS | Mod: PBBFAC,,, | Performed by: HOSPITALIST

## 2020-10-23 PROCEDURE — 70552 MRI BRAIN STEALTH WITHOUT FIDUCIALS: ICD-10-PCS | Mod: 26,,, | Performed by: RADIOLOGY

## 2020-10-23 PROCEDURE — 3008F PR BODY MASS INDEX (BMI) DOCUMENTED: ICD-10-PCS | Mod: CPTII,S$GLB,, | Performed by: HOSPITALIST

## 2020-10-23 PROCEDURE — 25500020 PHARM REV CODE 255: Performed by: NEUROLOGICAL SURGERY

## 2020-10-23 PROCEDURE — 99999 PR PBB SHADOW E&M-EST. PATIENT-LVL III: CPT | Mod: PBBFAC,,, | Performed by: HOSPITALIST

## 2020-10-23 PROCEDURE — A9585 GADOBUTROL INJECTION: HCPCS | Performed by: NEUROLOGICAL SURGERY

## 2020-10-23 PROCEDURE — 99204 OFFICE O/P NEW MOD 45 MIN: CPT | Mod: S$GLB,,, | Performed by: HOSPITALIST

## 2020-10-23 RX ORDER — GADOBUTROL 604.72 MG/ML
7 INJECTION INTRAVENOUS
Status: COMPLETED | OUTPATIENT
Start: 2020-10-23 | End: 2020-10-23

## 2020-10-23 RX ADMIN — GADOBUTROL 7 ML: 604.72 INJECTION INTRAVENOUS at 10:10

## 2020-10-23 NOTE — HPI
History of present illness- I had the pleasure of meeting this pleasant 35 y.o. lady in the pre op clinic prior to her elective Neuro surgery. The patient is new to me . Mary was accompanied by father Aris.    I have obtained the history by speaking to the patient and by reviewing the electronic health records.    Events leading up to surgery / History of presenting illness -    Intractable seizures    Had the first seizure age 12/13 Years that was attributed to Wellbutrin, Ritalin . They were discontinued with resolution of seizures at that time     Had a vaginal hysterectomy  At age 26 Years and had recurrence of seizures ( complex partial Grand mal )     Stress , anxiety and her seizure triggers     Last seizure August 12 th 2020     No troublesome Headaches       Relevant health conditions of significance for the perioperative period/ History of presenting illness -    Health conditions of significance for the perioperative period     ADHD     ODD ( oppositional defiance disorder )     PTSD    Not known to have heart disease , Diabetes Mellitus, Lung disease , HTN    Lives with son age 17 Y  Parents lives close by   Friends can help

## 2020-10-23 NOTE — ASSESSMENT & PLAN NOTE
At age 26   Had endometriosis   Has one ovary retained   Has 2 children   No hot flashes   No thyroid problems as per her

## 2020-10-23 NOTE — ASSESSMENT & PLAN NOTE
Gets dizzy on position changes   Suggested to change positions gradually and to stay hydrated     Stays hydrated     Not known to have adrenal problems    I suggest that the perioperative team be aware of this so that appropriate preventive care can be taken     Smokes Marijuana to help with her eating , drinking     Suggested nutritions food

## 2020-10-23 NOTE — PROGRESS NOTES
[facility]  Progress Note    Patient Name: Mary Haywood  MRN: [id number]  Date of Evaluation- 10/23/2020  PCP- Primary Doctor No    Future cases for Mary Haywood [[id number]]     Case ID Status Date Time Salomon Procedure Provider Location    [id number] [facility] 11/9/2020  7:00  CRANIOTOMY, USING FRAMELESS STEREOTAXY FOR  RIGHT SIDED RESECTION OF CAVERNOMA Nasra Wetzel MD [[id number]] [facility] OR 2ND FLR          HPI:  History of present illness- I had the pleasure of meeting this pleasant 35 y.o. lady in the pre op clinic prior to her elective Neuro surgery. The patient is new to me . Mary was accompanied by father Aris.    I have obtained the history by speaking to the patient and by reviewing the electronic health records.    Events leading up to surgery / History of presenting illness -    Intractable seizures    Had the first seizure age 12/13 Years that was attributed to Wellbutrin, Ritalin . They were discontinued with resolution of seizures at that time     Had a vaginal hysterectomy  At age 26 Years and had recurrence of seizures ( complex partial Grand mal )     Stress , anxiety and her seizure triggers     Last seizure August 12 th 2020     No troublesome Headaches       Relevant health conditions of significance for the perioperative period/ History of presenting illness -    Health conditions of significance for the perioperative period     ADHD     ODD ( oppositional defiance disorder )     PTSD    Not known to have heart disease , Diabetes Mellitus, Lung disease , HTN    Lives with son age 17 Y  Parents lives close by   Friends can help        Subjective/ Objective:     Chief complaint-Preoperative evaluation, Perioperative Medical management, complication reduction plan     Active cardiac conditions- none    Revised cardiac risk index predictors- none    Functional capacity -Examples of physical activity,  Walks the dog, house work, can walk,  can take 1 flight of stairs----- She  "can undertake all the above activities without  chest pain,chest tightness, Shortness of breath ,dizziness,lightheadedness making her exercise tolerance more, less  than 4 Mets.       Review of Systems   Constitutional: Negative for fever.        Lost weight last year from psychiatric problems - gained it back   HENT:        RAMIREZ score 0 / 8           Eyes:        No new visual changes   Respiratory:          Cough with phlegm   No change in color , consistency, amount of phlegm     No Hemoptysis   Cardiovascular:        As noted   Gastrointestinal:        No overt GI/ blood losses  Bowel movements- diarrhea constipated- long standing - suggested follow up - Uncle GI MD     Endocrine:        Prednisone use > 20 mg daily for 3 weeks- none   Genitourinary: Negative for dysuria.        No urinary hesitancy    Musculoskeletal:          No unusual, muscle, joint pains   Skin: Negative for rash.   Neurological: Negative for syncope.        No unilateral weakness   Hematological:        Current use of Anticoagulants  None   Psychiatric/Behavioral:          No SI/HI     No vascular stenting     No past medical history pertinent negatives.        No anesthesia, bleeding, cardiac problems, PONV with previous surgeries/procedures.  Medications and Allergies reviewed in epic.   FH-   FH- No anesthesia,bleeding / venous thrombosis ,problems in family   Physical Exam  Blood pressure (!) 89/62, pulse (!) 58, temperature 97.8 °F (36.6 °C), temperature source Oral, height 5' 5" (1.651 m), weight 64 kg (141 lb), , SpO2 97 %.      Physical Exam  Constitutional- Vitals - Body mass index is 23.46 kg/m².,   Vitals:    10/23/20 1057   BP: (!) 89/62   Pulse: (!) 58   Temp: 97.8 °F (36.6 °C)     General appearance-Conscious,Coherent  Eyes- No conjunctival icterus,pupils  round  and reactive to light   ENT-Oral cavity- moist  , Hearing grossly normal   Neck- No thyromegaly ,Trachea -central, No jugular venous distension,   No Carotid " Bruit   Cardiovascular -Heart Sounds- Normal  and  no murmur   , No gallop rhythm   Respiratory - Normal Respiratory Effort, Normal breath sounds,  no wheeze  and  no forced expiratory wheeze    Peripheral pitting pedal edema-- none , no calf pain   Gastrointestinal -Soft abdomen, No palpable masses, Non Tender,Liver,Spleen not palpable. No-- free fluid and shifting dullness  Musculoskeletal- No finger Clubbing. Strength grossly normal   Lymphatic-No Palpable cervical, axillary,Inguinal lymphadenopathy   Psychiatric - normal effect,Orientation  Rt Dorsalis pedis pulses-palpable    Lt Dorsalis pedis pulses- palpable   Rt Posterior tibial pulses -palpable   Left posterior tibial pulses -palpable   Miscellaneous -  no asterixis,  no dupuytren's contracture,  no renal bruit, palpable abdominal aorta and  tattoos  Investigations  Lab and Imaging have been reviewed in epic.          Review of old records- Was done and information gathered regards to events leading to surgery and health conditions of significance in the perioperative period.        Preoperative cardiac risk assessment-  The patient does not have any active cardiac conditions . Revised cardiac risk index predictors- 0---.Functional capacity is more than 4 Mets. She will be undergoing a Neuro  procedure that carries a Moderate Risk risk     Risk of a major Cardiac event ( Defined as death, myocardial infarction, or cardiac arrest at 30 days after noncardiac surgery), based on RCRI score     -3.9%         No further cardiac work up is indicated prior to proceeding with the surgery     Orders Placed This Encounter    Hemoglobin A1C    Ambulatory referral/consult to Smoking Cessation Program       American Society of Anesthesiologists Physical status classification ( ASA ) class: 3     Postoperative pulmonary complication risk assessment:      ARISCAT ( Canet) risk index- risk class -  Low, if duration of surgery is under 3 hours, intermediate, if duration of  surgery is over 3 hours      [facility] Respiratory failure index- percentage risk of respiratory failure: 1.8 %     Assessment/Plan:     Mild 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  Had evaluation   As per chart  review- Short-term memory problems     As per her description - has problem mid conversation - particularly with distractions    Not known to have dementia     Increased risk of post operative delirium     Intractable epilepsy  For surgery  Planning on staying on Seizure Medication     Suggested avoidance of Tramadol that can lower seizure threshold       Anxiety and depression  And PTSD   On Zoloft   Has been having increasing problems since 2012 correlating with her seizure recurrence    I suggest monitoring the sodium as SIADH from Zoloft  use and hypersecretion of ADH associated with surgery can reduce sodium in the perioperative period    She does not want to take Xanax as her family member has problems with Xanax     H/O vaginal hysterectomy  At age 26   Had endometriosis   Has one ovary retained   Has 2 children   No hot flashes   No thyroid problems as per her     Postural dizziness  Gets dizzy on position changes   Suggested to change positions gradually and to stay hydrated     Stays hydrated     Not known to have adrenal problems    I suggest that the perioperative team be aware of this so that appropriate preventive care can be taken     Smokes Marijuana to help with her eating , drinking     Suggested nutritions food     Currently smokes tobacco  Tobacco use- Smokes Cigarettes- 1 PPD- I suggested to consider stopping  smoking tobacco for its benefits in the jeronimo operative period and in the long term    Tobacco use-I  Informed about risk of wound healing problem ,infection,lung complications,thrombosis with tobacco use     Suggested quitting tobacco      Not known to have COPD, Bronchitis, Emphysema,  No inhaler, oxygen use     Not troubled with SOB    No suggestion of advanced  lung diosease based on her symptoms     Referred for tobacco cessation     Constipation  Constipation- I suggest giving laxative regimen as opioid use,reduced ambulation  can increase the constipation     Hyperglycemia  Check A1c     Hyponatremia  Mildly low   100 OZ of water   1 coke a day    Passes light colored urine suggesting that she may not have SIADH    Palpable abdominal aorta  Likely from her built   Offered US   She deferred it for now   Suggested follow up         Preventive perioperative care    Thromboembolic prophylaxis:  Her risk factors for thrombosis include surgical procedure.I suggest  thromboembolic prophylaxis ( mechanical/pharmacological, weighing the risk benefits of pharmacological agent use considering jeronimo procedural bleeding )  during the perioperative period.I suggested being active in the post operative period.      Postoperative pulmonary complication prophylaxis-Risk factors for post operative pulmonary complications include ASA class >2- I suggest incentive spirometry use, early ambulation and end tidal carbon dioxide monitoring  , oral care , head end of bed elevation      Renal complication prophylaxis- I suggest keeping her well hydrated  in the perioperative period.I suggested drinking 2 litre's of water a day      Surgical site Infection Prophylaxis-I  suggest appropriate antibiotic for Prophylaxis against Surgical site infections  No reported Staph infection  Hibiclens- neck down      Delirium prophylaxis-Risk factors -cognitive impairment  - I suggest avoidance / minimizing the use of  Benzodiazepines ( unless the patient has been taking it on a regular basis ),Anticholinergic medication,Antihistamines ( like  Benadryl).I suggest minimizing the use of opioid medication and use of IV tylenol,if it is appropriate. I suggest using the lowest possible dose of opioids for the shortest duration possible in the perioperative period. I suggest to Keep shades/blinds open during the  day, lights off and shades closed at night to encourage normal sleep/wake cycle.I encourage the presence of the family member with the patient at all times, if at all possible as mental status changes can be picked up early by the family members and they help with reorientation. I encouraged the presence of family to help with orientation in the perioperative period. Benadryl avoidance suggested       This visit was focused on Preoperative evaluation, Perioperative Medical management, complication reduction plans. I suggest that the patient follows up with primary care or relevant sub specialists for ongoing health care.    I appreciate the opportunity to be involved in this patients care. Please feel free to contact me if there were any questions about this consultation.    Patient is optimized     Patient was instructed to call and update me about any changes to health,  medication, office visits ,testing out side of the [facility] operative care center , hospitalizations between now and surgery     Bri Rodríguez MD  Perioperative Medicine  [facility]   Pager [phone]    COVID screening     No fever   No cough   No SOB  No sore throat   No loss of taste or smell   No muscle aches   No nausea, vomiting , diarrhea  --  10/23- 13 38     Addendum to exam   Offered chaperone during exam that she declined   -  10/23/2020- 15 30     Labs are acceptable for surgery     Called to discuss labs   Left a message   Suggest follow up regarding macrocytosis   Call, if needed     Called and spoke to father   She eats meat   Fruits , vegetables discussed   No tingling , numbness hands feet   Call, if needed    --  11/6- 19 06     Called to follow up , to address any concerns with the up coming surgery or any questions on Medication instructions   Unable to speak   Left a message to call, if needed , or if had any changes to medication or health , if has any concerns with the up coming surgery or any questions on  Medication instructions

## 2020-10-23 NOTE — ASSESSMENT & PLAN NOTE
And PTSD   On Zoloft   Has been having increasing problems since 2012 correlating with her seizure recurrence    I suggest monitoring the sodium as SIADH from Zoloft  use and hypersecretion of ADH associated with surgery can reduce sodium in the perioperative period    She does not want to take Xanax as her family member has problems with Xanax

## 2020-10-23 NOTE — DISCHARGE INSTRUCTIONS
Your surgery has been scheduled for:__________________________________________    You should report to:  ____[facility], located on the [place] side of the first floor of the           [facility] ([phone])  ____[facility], located on the [facility] side of the            Second floor of the [facility] ([phone])  ____[facility] ([facility]) Located at [address] [facility] [address]  Please Note   - Tell your doctor if you take Aspirin, products containing Aspirin, herbal medications  or blood thinners, such as Coumadin, Ticlid, or Plavix.  (Consult your provider regarding holding or stopping before surgery).  - Arrange for someone to drive you home following surgery.  You will not be allowed to leave the surgical facility alone or drive yourself home following sedation and anesthesia.  Before Surgery  - Stop taking all herbal medications 14days prior to surgery  - No Motrin/Advil (Ibuprofen) 7 days before surgery  - No Aleve (Naproxen) 7 days before surgery  - Stop Taking Aspirin, products containing Aspirin _____days before surgery  - Stop taking blood thinners_______days before surgery  - No Goody's/BC  Powder 7 days before surgery  - Refrain from drinking alcoholic beverages for 24hours before and after surgery  - Stop or limit smoking _________days before surgery  - You may take Tylenol for pain  Night before Surgery   Stop ALL solid food, gum, candy (including vitamins) 8 hours before arrival time.  (Please note: If your surgeon gives you different eating and drinking instructions, please follow surgeon's directions.)   Stop all CLOUDY liquids: coffee with creamer, formula, tube feeds, cloudy juices, non-human milk and breast milk with additives, 6 hours prior to arrival time.   Stop plain breast milk 4 hours prior to arrival time.   The patient should be ENCOURAGED to drink carbohydrate-rich clear liquids (sports  drinks, clear juices) until 2 hours prior to arrival time.   CLEAR liquids include only water, black coffee NO creamer, clear oral rehydration drinks, clear sports drinks or clear fruit juices (no orange juice, no pulpy juices, no apple cider). Advise patients if they can read newsprint through the liquid, it qualifies as clear liquid.    IF IN DOUBT, drink water instead.   - Take a shower or bath (shower is recommended).  Bathe with Hibiclens soap or an antibacterial soap from the neck down.  If not supplied by your surgeon, hibiclens soap will need to be purchased over the counter in pharmacy.  Rinse soap off thoroughly.  - Shampoo your hair with your regular shampoo  The Day of Surgery  · NOTHING TO  DRINK 2 hours before arrival time. If you are told to take medication on the morning of surgery, it may be taken with a sip of water.   - Take another bath or shower with hibiclens or any antibacterial soap, to reduce the chance of infection.  - Take heart and blood pressure medications with a small sip of water, as advised by the perioperative team.  - Do not take fluid pills  - You may brush your teeth and rinse your mouth, but do not swallow any additional water.   - Do not apply perfumes, powder, body lotions or deodorant on the day of surgery.  - Nail polish should be removed.  - Do not wear makeup or moisturizer  - Wear comfortable clothes, such as a button front shirt and loose fitting pants.  - Leave all jewelry, including body piercings, and valuables at home.    - Bring any devices you will neeed after surgery such as crutches or canes.  - If you have sleep apnea, please bring your CPAP machine  In the event that your physical condition changes including the onset of a cold or respiratory illness, or if you have to delay or cancel your surgery, please notify your surgeon.  Anesthesia: General Anesthesia     You are watched continuously during your procedure by your anesthesia provider.     Youre due to  have surgery. During surgery, youll be given medicine called anesthesia or anesthetic. This will keep you comfortable and pain-free. Your anesthesia provider will use general anesthesia.  What is general anesthesia?  General anesthesia puts you into a state like deep sleep. It goes into the bloodstream (IV anesthetics), into the lungs (gas anesthetics), or both. You feel nothing during the procedure. You will not remember it. During the procedure, the anesthesia provider monitors you continuously. He or she checks your heart rate and rhythm, blood pressure, breathing, and blood oxygen.  · IV anesthetics. IV anesthetics are given through an IV line in your arm. Theyre often given first. This is so you are asleep before a gas anesthetic is started. Some kinds of IV anesthetics relieve pain. Others relax you. Your doctor will decide which kind is best in your case.  · Gas anesthetics. Gas anesthetics are breathed into the lungs. They are often used to keep you asleep. They can be given through a facemask or a tube placed in your larynx or trachea (breathing tube).  ? If you have a facemask, your anesthesia provider will most likely place it over your nose and mouth while youre still awake. Youll breathe oxygen through the mask as your IV anesthetic is started. Gas anesthetic may be added through the mask.  ? If you have a tube in the larynx or trachea, it will be inserted into your throat after youre asleep.  Anesthesia tools and medicines  You will likely have:  · IV anesthetics. These are put into an IV line into your bloodstream.  · Gas anesthetics. You breathe these anesthetics into your lungs, where they pass into your bloodstream.  · Pulse oximeter. This is a small clip that is attached to the end of your finger. This measures your blood oxygen level.  · Electrocardiography leads (electrodes). These are small sticky pads that are placed on your chest. They record your heart rate and rhythm.  · Blood pressure  cuff. This reads your blood pressure.  Risks and possible complications  General anesthesia has some risks. These include:  · Breathing problems  · Nausea and vomiting  · Sore throat or hoarseness (usually temporary)  · Allergic reaction to the anesthetic  · Irregular heartbeat (rare)  · Cardiac arrest (rare)   Anesthesia safety  · Follow all instructions you are given for how long not to eat or drink before your procedure.  · Be sure your doctor knows what medicines and drugs you take. This includes over-the-counter medicines, herbs, supplements, alcohol or other drugs. You will be asked when those were last taken.  · Have an adult family member or friend drive you home after the procedure.  · For the first 24 hours after your surgery:  ? Do not drive or use heavy equipment.  ? Do not make important decisions or sign legal documents. If important decisions or signing legal documents is necessary during the first 24 hours after surgery, have a trusted family member or spouse act on your behalf.  ? Avoid alcohol.  ? Have a responsible adult stay with you. He or she can watch for problems and help keep you safe.  Date Last Reviewed: 12/1/2016 © 2000-2017 [employer]. [address] [place] [address]. All rights reserved. This information is not intended as a substitute for professional medical care. Always follow your healthcare professional's instructions

## 2020-10-23 NOTE — OUTPATIENT SUBJECTIVE & OBJECTIVE
"Outpatient Subjective & Objective     Chief complaint-Preoperative evaluation, Perioperative Medical management, complication reduction plan     Active cardiac conditions- none    Revised cardiac risk index predictors- none    Functional capacity -Examples of physical activity,  Walks the dog, house work, can walk,  can take 1 flight of stairs----- She can undertake all the above activities without  chest pain,chest tightness, Shortness of breath ,dizziness,lightheadedness making her exercise tolerance more, less  than 4 Mets.       Review of Systems   Constitutional: Negative for fever.        Lost weight last year from psychiatric problems - gained it back   HENT:        MANUELAG score 0 / 8           Eyes:        No new visual changes   Respiratory:          Cough with phlegm   No change in color , consistency, amount of phlegm     No Hemoptysis   Cardiovascular:        As noted   Gastrointestinal:        No overt GI/ blood losses  Bowel movements- diarrhea constipated- long standing - suggested follow up - Uncle GI MD     Endocrine:        Prednisone use > 20 mg daily for 3 weeks- none   Genitourinary: Negative for dysuria.        No urinary hesitancy    Musculoskeletal:          No unusual, muscle, joint pains   Skin: Negative for rash.   Neurological: Negative for syncope.        No unilateral weakness   Hematological:        Current use of Anticoagulants  None   Psychiatric/Behavioral:          No SI/HI     No vascular stenting     No past medical history pertinent negatives.        No anesthesia, bleeding, cardiac problems, PONV with previous surgeries/procedures.  Medications and Allergies reviewed in epic.   FH-   FH- No anesthesia,bleeding / venous thrombosis ,problems in family   Physical Exam  Blood pressure (!) 89/62, pulse (!) 58, temperature 97.8 °F (36.6 °C), temperature source Oral, height 5' 5" (1.651 m), weight 64 kg (141 lb), , SpO2 97 %.      Physical Exam  Constitutional- Vitals - Body mass " index is 23.46 kg/m².,   Vitals:    10/23/20 1057   BP: (!) 89/62   Pulse: (!) 58   Temp: 97.8 °F (36.6 °C)     General appearance-Conscious,Coherent  Eyes- No conjunctival icterus,pupils  round  and reactive to light   ENT-Oral cavity- moist  , Hearing grossly normal   Neck- No thyromegaly ,Trachea -central, No jugular venous distension,   No Carotid Bruit   Cardiovascular -Heart Sounds- Normal  and  no murmur   , No gallop rhythm   Respiratory - Normal Respiratory Effort, Normal breath sounds,  no wheeze  and  no forced expiratory wheeze    Peripheral pitting pedal edema-- none , no calf pain   Gastrointestinal -Soft abdomen, No palpable masses, Non Tender,Liver,Spleen not palpable. No-- free fluid and shifting dullness  Musculoskeletal- No finger Clubbing. Strength grossly normal   Lymphatic-No Palpable cervical, axillary,Inguinal lymphadenopathy   Psychiatric - normal effect,Orientation  Rt Dorsalis pedis pulses-palpable    Lt Dorsalis pedis pulses- palpable   Rt Posterior tibial pulses -palpable   Left posterior tibial pulses -palpable   Miscellaneous -  no asterixis,  no dupuytren's contracture,  no renal bruit, palpable abdominal aorta and  tattoos  Investigations  Lab and Imaging have been reviewed in epic.          Review of old records- Was done and information gathered regards to events leading to surgery and health conditions of significance in the perioperative period.    Outpatient Subjective & Objective

## 2020-10-23 NOTE — LETTER
October 23, 2020      Nasra Wetzel MD  [address] [facility] [address]           [facility]  [address] [facility] [address]  Phone: [phone]          Patient: Mary Haywood   MR Number: [id number]   YOB: 1985   Date of Visit: 10/23/2020       Dear Dr. Nasra Wetzel:    Thank you for referring Mary Haywood to me for evaluation. Attached you will find relevant portions of my assessment and plan of care.    If you have questions, please do not hesitate to call me. I look forward to following Mary Haywood along with you.    Sincerely,    Bri Rodríguez MD    Enclosure  CC:  Charmaine Hennessy MD PhD    If you would like to receive this communication electronically, please contact [email or web address] or [phone] to request more information on [employer] access.    For providers and/or their staff who would like to refer a patient to [facility], please contact us through our one-stop-shop provider referral line, [facility], at [phone].    If you feel you have received this communication in error or would no longer like to receive these types of communications, please e-mail [email or web address]

## 2020-10-23 NOTE — ASSESSMENT & PLAN NOTE
Tobacco use- Smokes Cigarettes- 1 PPD- I suggested to consider stopping  smoking tobacco for its benefits in the jeronimo operative period and in the long term    Tobacco use-I  Informed about risk of wound healing problem ,infection,lung complications,thrombosis with tobacco use     Suggested quitting tobacco      Not known to have COPD, Bronchitis, Emphysema,  No inhaler, oxygen use     Not troubled with SOB    No suggestion of advanced lung diosease based on her symptoms     Referred for tobacco cessation

## 2020-10-23 NOTE — ASSESSMENT & PLAN NOTE
Mildly low   100 OZ of water   1 coke a day    Passes light colored urine suggesting that she may not have SIADH    Sodium - normal from 10/23/2020

## 2020-10-23 NOTE — ASSESSMENT & PLAN NOTE
Likely from her thin built   Vascular risk factor - tobacco use   Offered US   She deferred it for now   Plans on quitting tobacco   Suggested follow up

## 2020-10-23 NOTE — ASSESSMENT & PLAN NOTE
Had evaluation   As per chart  review- Short-term memory problems     As per her description - has problem mid conversation - particularly with distractions    Not known to have dementia     Increased risk of post operative delirium

## 2020-10-23 NOTE — ASSESSMENT & PLAN NOTE
For surgery  Planning on staying on Seizure Medication     Suggested avoidance of Tramadol that can lower seizure threshold

## 2020-10-27 ENCOUNTER — TELEPHONE (OUTPATIENT)
Dept: NEUROLOGY | Facility: CLINIC | Age: 35
End: 2020-10-27

## 2020-10-28 ENCOUNTER — PATIENT MESSAGE (OUTPATIENT)
Dept: SURGERY | Facility: HOSPITAL | Age: 35
End: 2020-10-28

## 2020-10-30 ENCOUNTER — TELEPHONE (OUTPATIENT)
Dept: NEUROSURGERY | Facility: CLINIC | Age: 35
End: 2020-10-30

## 2020-11-02 ENCOUNTER — TELEPHONE (OUTPATIENT)
Dept: NEUROLOGY | Facility: CLINIC | Age: 35
End: 2020-11-02

## 2020-11-03 ENCOUNTER — PATIENT MESSAGE (OUTPATIENT)
Dept: NEUROLOGY | Facility: CLINIC | Age: 35
End: 2020-11-03

## 2020-11-03 ENCOUNTER — PATIENT MESSAGE (OUTPATIENT)
Dept: SURGERY | Facility: HOSPITAL | Age: 35
End: 2020-11-03

## 2020-11-04 ENCOUNTER — PATIENT MESSAGE (OUTPATIENT)
Dept: SURGERY | Facility: HOSPITAL | Age: 35
End: 2020-11-04

## 2020-11-04 ENCOUNTER — TELEPHONE (OUTPATIENT)
Dept: NEUROLOGY | Facility: CLINIC | Age: 35
End: 2020-11-04

## 2020-11-04 ENCOUNTER — PATIENT MESSAGE (OUTPATIENT)
Dept: NEUROLOGY | Facility: CLINIC | Age: 35
End: 2020-11-04

## 2020-11-04 NOTE — TELEPHONE ENCOUNTER
----- Message from Miya Little sent at 11/4/2020 12:58 PM CST -----  Contact: Mary Haywood  Patient is needing to speak with someone in the office about her appts that are needing to be schedule. Patient stated she have children and needed to get things in order for them. Patient contact number is [phone].

## 2020-11-05 ENCOUNTER — TELEPHONE (OUTPATIENT)
Dept: NEUROSURGERY | Facility: CLINIC | Age: 35
End: 2020-11-05

## 2020-11-05 NOTE — TELEPHONE ENCOUNTER
----- Message from Zainab Parada sent at 11/5/2020  2:14 PM CST -----  Regarding: Cigarette Smoking  Contact: Patient Called [phone]  Patient is calling to speak with Nurse in regards to her surgery on 11/9/20.  States that she is a cigarette smoker and states that she can not quit smoking.  Wanting to make sure this will not affect her surgery or her body.

## 2020-11-06 ENCOUNTER — INITIAL CONSULT (OUTPATIENT)
Dept: NEUROLOGY | Facility: CLINIC | Age: 35
DRG: 026 | End: 2020-11-06
Payer: MEDICARE

## 2020-11-06 DIAGNOSIS — F43.10 PTSD (POST-TRAUMATIC STRESS DISORDER): ICD-10-CM

## 2020-11-06 DIAGNOSIS — G40.219 COMPLEX PARTIAL EPILEPSY WITH GENERALIZATION AND WITH INTRACTABLE EPILEPSY: ICD-10-CM

## 2020-11-06 DIAGNOSIS — F41.9 ANXIETY AND DEPRESSION: ICD-10-CM

## 2020-11-06 DIAGNOSIS — F32.A ANXIETY AND DEPRESSION: ICD-10-CM

## 2020-11-06 DIAGNOSIS — F06.70 MILD NEUROCOGNITIVE DISORDER DUE TO ANOTHER MEDICAL CONDITION: Primary | ICD-10-CM

## 2020-11-06 PROCEDURE — 99499 UNLISTED E&M SERVICE: CPT | Mod: S$GLB,,, | Performed by: CLINICAL NEUROPSYCHOLOGIST

## 2020-11-06 PROCEDURE — 96138 PR PSYCH/NEUROPSYCH TEST ADMIN/SCORING, BY TECH, 2+ TESTS, 1ST 30 MIN: ICD-10-PCS | Mod: S$GLB,,, | Performed by: CLINICAL NEUROPSYCHOLOGIST

## 2020-11-06 PROCEDURE — 96132 PR NEUROPSYCHOLOGIC TEST EVAL SVCS, 1ST HR: ICD-10-PCS | Mod: S$GLB,,, | Performed by: CLINICAL NEUROPSYCHOLOGIST

## 2020-11-06 PROCEDURE — 96133 PR NEUROPSYCHOLOGIC TEST EVAL SVCS, EA ADDTL HR: ICD-10-PCS | Mod: S$GLB,,, | Performed by: CLINICAL NEUROPSYCHOLOGIST

## 2020-11-06 PROCEDURE — 99999 PR PBB SHADOW E&M-EST. PATIENT-LVL I: ICD-10-PCS | Mod: PBBFAC,,, | Performed by: CLINICAL NEUROPSYCHOLOGIST

## 2020-11-06 PROCEDURE — 96139 PSYCL/NRPSYC TST TECH EA: CPT | Mod: S$GLB,,, | Performed by: CLINICAL NEUROPSYCHOLOGIST

## 2020-11-06 PROCEDURE — 99999 PR PBB SHADOW E&M-EST. PATIENT-LVL I: CPT | Mod: PBBFAC,,, | Performed by: CLINICAL NEUROPSYCHOLOGIST

## 2020-11-06 PROCEDURE — 96132 NRPSYC TST EVAL PHYS/QHP 1ST: CPT | Mod: S$GLB,,, | Performed by: CLINICAL NEUROPSYCHOLOGIST

## 2020-11-06 PROCEDURE — 96138 PSYCL/NRPSYC TECH 1ST: CPT | Mod: S$GLB,,, | Performed by: CLINICAL NEUROPSYCHOLOGIST

## 2020-11-06 PROCEDURE — 96139 PR PSYCH/NEUROPSYCH TEST ADMIN/SCORING, BY TECH, 2+ TESTS, EA ADDTL 30 MIN: ICD-10-PCS | Mod: S$GLB,,, | Performed by: CLINICAL NEUROPSYCHOLOGIST

## 2020-11-06 PROCEDURE — 96133 NRPSYC TST EVAL PHYS/QHP EA: CPT | Mod: S$GLB,,, | Performed by: CLINICAL NEUROPSYCHOLOGIST

## 2020-11-06 PROCEDURE — 99499 NO LOS: ICD-10-PCS | Mod: S$GLB,,, | Performed by: CLINICAL NEUROPSYCHOLOGIST

## 2020-11-07 ENCOUNTER — LAB VISIT (OUTPATIENT)
Dept: FAMILY MEDICINE | Facility: CLINIC | Age: 35
End: 2020-11-07
Payer: MEDICARE

## 2020-11-07 DIAGNOSIS — Z01.818 PRE-OP TESTING: ICD-10-CM

## 2020-11-07 DIAGNOSIS — Z78.9 KNOWN HEALTH PROBLEMS: NONE: ICD-10-CM

## 2020-11-07 PROCEDURE — U0003 INFECTIOUS AGENT DETECTION BY NUCLEIC ACID (DNA OR RNA); SEVERE ACUTE RESPIRATORY SYNDROME CORONAVIRUS 2 (SARS-COV-2) (CORONAVIRUS DISEASE [COVID-19]), AMPLIFIED PROBE TECHNIQUE, MAKING USE OF HIGH THROUGHPUT TECHNOLOGIES AS DESCRIBED BY CMS-2020-01-R: HCPCS

## 2020-11-08 LAB — SARS-COV-2 RNA RESP QL NAA+PROBE: NOT DETECTED

## 2020-11-08 NOTE — ANESTHESIA PREPROCEDURE EVALUATION
[facility]  Anesthesia Pre-Operative Evaluation         Patient Name: Mary Haywood  YOB: 1985  MRN: [id number]    SUBJECTIVE:     Pre-operative evaluation for Procedure(s) (LRB):  CRANIOTOMY, USING FRAMELESS STEREOTAXY FOR  RIGHT SIDED RESECTION OF CAVERNOMA (Right)     11/08/2020    Mary Haywood is a 35 y.o. female w/ a significant PMHx of epilepsy, anxiety, and depression. Her seizures first began when she was 11, then resumed when she was pregnant with her 18-year-old son, and again at age 26 after she had hysterectomy. Her events are described as either complex partial or generalized.    Management w/ medication has failed in the past.    Patient now presents for the above procedure(s).      LDA: None documented.       Prev airway: None documented.    Drips: None documented.      Patient Active Problem List   Diagnosis    Temporal lobe epilepsy    Complex partial epilepsy with generalization and with intractable epilepsy    Anxiety and depression    PTSD (post-traumatic stress disorder)    HSV-1 infection    HSV-2 infection    H/O vaginal hysterectomy    Hormonal disorder    Partial symptomatic epilepsy with complex partial seizures, intractable, with status epilepticus    Mild 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    Intractable epilepsy    Postural dizziness    Currently smokes tobacco    Constipation    Hyperglycemia    Hyponatremia    Palpable abdominal aorta    Macrocytosis without anemia       Review of patient's allergies indicates:  No Known Allergies    Current Inpatient Medications:      No current facility-administered medications on file prior to encounter.      Current Outpatient Medications on File Prior to Encounter   Medication Sig Dispense Refill    cloBAZam (ONFI) 20 mg Tab Take 2 tablets (40 mg total) by mouth 2 (two) times daily. 120 tablet 5    lamoTRIgine (LAMICTAL) 150 MG Tab Take 3  tablets (450 mg total) by mouth once daily. (300mg in the morning, 150mg at night) (Patient taking differently: Take 450 mg by mouth once daily. (300mg at night , 150mg in the morning )) 270 tablet 3    sertraline (ZOLOFT) 100 MG tablet Take 1 tablet (100 mg total) by mouth once daily. (Patient taking differently: Take 100 mg by mouth every evening. ) 90 tablet 3    valACYclovir (VALTREX) 1000 MG tablet Take 1,000 mg by mouth 2 (two) times daily. Taking prn  0       Past Surgical History:   Procedure Laterality Date    HYSTERECTOMY  2011    endometriosis    OOPHORECTOMY  2011    ovarian cyst    wisdom teeth removal         Social History     Socioeconomic History    Marital status: Single     Spouse name: Not on file    Number of children: Not on file    Years of education: Not on file    Highest education level: Not on file   Occupational History    Occupation: disability   Social Needs    Financial resource strain: Not on file    Food insecurity     Worry: Not on file     Inability: Not on file    Transportation needs     Medical: Not on file     Non-medical: Not on file   Tobacco Use    Smoking status: Current Every Day Smoker     Packs/day: 1.00     Types: Cigarettes     Start date: 7/29/1998    Smokeless tobacco: Never Used   Substance and Sexual Activity    Alcohol use: Yes     Frequency: Monthly or less     Drinks per session: 3 or 4     Comment: once a month    Drug use: Yes     Types: Marijuana    Sexual activity: Not Currently     Partners: Male     Birth control/protection: See Surgical Hx, Surgical     Comment:    Lifestyle    Physical activity     Days per week: Not on file     Minutes per session: Not on file    Stress: To some extent   Relationships    Social connections     Talks on phone: Not on file     Gets together: Not on file     Attends Mu-ism service: Not on file     Active member of club or organization: Not on file     Attends meetings of clubs or  organizations: Not on file     Relationship status: Not on file   Other Topics Concern    Not on file   Social History Narrative    Live alone       OBJECTIVE:     Vital Signs Range (Last 24H):         Significant Labs:  Lab Results   Component Value Date    WBC 7.12 10/23/2020    HGB 13.3 10/23/2020    HCT 39.0 10/23/2020     10/23/2020    ALT 8 (L) 10/23/2020    AST 11 10/23/2020     10/23/2020    K 3.5 10/23/2020     10/23/2020    CREATININE 0.7 10/23/2020    BUN 7 10/23/2020    CO2 29 10/23/2020    INR 0.9 10/23/2020    HGBA1C 4.8 10/23/2020       Diagnostic Studies: No relevant studies.    EKG: No recent studies available.    2D ECHO:  No results found for this or any previous visit.      ASSESSMENT/PLAN:       Anesthesia Evaluation    I have reviewed the Patient Summary Reports.     I have reviewed the Nursing Notes. I have reviewed the NPO Status.      Review of Systems  Anesthesia Hx:  No problems with previous Anesthesia  History of prior surgery of interest to airway management or planning: Family Hx of Anesthesia complications: Family Anesthesia Complications are Pt adopted-unsure of family history  Denies Personal Hx of Anesthesia complications.   Social:  Smoker, No Alcohol Use  Tobacco Use:  of cigarette, 1 pack per day Alcohol Use:  Illicit Drug Use: Types of drugs include Marijuana,   Hematology/Oncology:  Hematology Normal   Oncology Normal     EENT/Dental:   Jaw Problems:  Clicking (TMJ) and Limited mouth opening   Cardiovascular:  Cardiovascular Normal     Pulmonary:  Pulmonary Normal  Denies Shortness of breath.  Denies Recent URI.    Renal/:  Renal/ Normal     Hepatic/GI:  Hepatic/GI Normal    Neurological:   Seizures Brain mass Seizure Disorder , Most recent seizure occurred 1-6  months ago , frequency is occasional , anticonvulsant levels are within therapeutic range. Lamotrigine level 9/4/20   Endocrine:  Endocrine Normal    Psych:   Psychiatric History  Anxiety  Disorder and Past Hx of Anxiety.  Attention Deficit Disorder. Psychotic Disorder and Post-traumatic Stress Disorder.          Physical Exam  General:  Well nourished    Airway/Jaw/Neck:  Airway Findings: Mouth Opening: Normal Tongue: Normal  Mallampati: II  Improves to I with phonation.  TM Distance: Normal, at least 6 cm  Jaw/Neck Findings:  Neck ROM: Normal ROM      Dental:  Dental Findings: (broken tooth bottom right ) In tact   Chest/Lungs:  Chest/Lungs Findings: Clear to auscultation, Normal Respiratory Rate     Heart/Vascular:  Heart Findings: Rate: Normal        Mental Status:  Mental Status Findings:  Cooperative, Alert and Oriented         Anesthesia Plan  Type of Anesthesia, risks & benefits discussed:  Anesthesia Type:  general  Patient's Preference:   Intra-op Monitoring Plan: standard ASA monitors and arterial line  Intra-op Monitoring Plan Comments:   Post Op Pain Control Plan: multimodal analgesia  Post Op Pain Control Plan Comments:   Induction:   IV  Beta Blocker:  Patient is not currently on a Beta-Blocker (No further documentation required).       Informed Consent: Patient understands risks and agrees with Anesthesia plan.  Questions answered. Anesthesia consent signed with patient.  ASA Score: 3     Day of Surgery Review of History & Physical:    H&P update referred to the surgeon.         Ready For Surgery From Anesthesia Perspective.

## 2020-11-09 ENCOUNTER — HOSPITAL ENCOUNTER (INPATIENT)
Facility: HOSPITAL | Age: 35
LOS: 3 days | Discharge: HOME | DRG: 026 | End: 2020-11-12
Attending: NEUROLOGICAL SURGERY | Admitting: NEUROLOGICAL SURGERY
Payer: MEDICARE

## 2020-11-09 ENCOUNTER — ANESTHESIA (OUTPATIENT)
Dept: SURGERY | Facility: HOSPITAL | Age: 35
DRG: 026 | End: 2020-11-09
Payer: MEDICARE

## 2020-11-09 DIAGNOSIS — D18.00 CAVERNOMA: ICD-10-CM

## 2020-11-09 DIAGNOSIS — Z01.818 PREOPERATIVE TESTING: ICD-10-CM

## 2020-11-09 PROBLEM — Z72.0 TOBACCO USE: Status: ACTIVE | Noted: 2020-11-09

## 2020-11-09 LAB
ABO + RH BLD: NORMAL
ANION GAP SERPL CALC-SCNC: 6 MMOL/L (ref 8–16)
APTT BLDCRRT: 33 SEC (ref 21–32)
BASOPHILS # BLD AUTO: 0.01 K/UL (ref 0–0.2)
BASOPHILS NFR BLD: 0.2 % (ref 0–1.9)
BLD GP AB SCN CELLS X3 SERPL QL: NORMAL
BUN SERPL-MCNC: 4 MG/DL (ref 6–20)
CALCIUM SERPL-MCNC: 7.7 MG/DL (ref 8.7–10.5)
CHLORIDE SERPL-SCNC: 111 MMOL/L (ref 95–110)
CO2 SERPL-SCNC: 24 MMOL/L (ref 23–29)
CREAT SERPL-MCNC: 0.7 MG/DL (ref 0.5–1.4)
DIFFERENTIAL METHOD: ABNORMAL
EOSINOPHIL # BLD AUTO: 0 K/UL (ref 0–0.5)
EOSINOPHIL NFR BLD: 0.2 % (ref 0–8)
ERYTHROCYTE [DISTWIDTH] IN BLOOD BY AUTOMATED COUNT: 13.2 % (ref 11.5–14.5)
EST. GFR  (AFRICAN AMERICAN): >60 ML/MIN/1.73 M^2
EST. GFR  (NON AFRICAN AMERICAN): >60 ML/MIN/1.73 M^2
GLUCOSE SERPL-MCNC: 160 MG/DL (ref 70–110)
HCT VFR BLD AUTO: 37.5 % (ref 37–48.5)
HGB BLD-MCNC: 12.9 G/DL (ref 12–16)
IMM GRANULOCYTES # BLD AUTO: 0.05 K/UL (ref 0–0.04)
IMM GRANULOCYTES NFR BLD AUTO: 1.2 % (ref 0–0.5)
INR PPP: 0.9 (ref 0.8–1.2)
LYMPHOCYTES # BLD AUTO: 0.6 K/UL (ref 1–4.8)
LYMPHOCYTES NFR BLD: 13.1 % (ref 18–48)
MCH RBC QN AUTO: 34.7 PG (ref 27–31)
MCHC RBC AUTO-ENTMCNC: 34.4 G/DL (ref 32–36)
MCV RBC AUTO: 101 FL (ref 82–98)
MONOCYTES # BLD AUTO: 0.1 K/UL (ref 0.3–1)
MONOCYTES NFR BLD: 1.2 % (ref 4–15)
NEUTROPHILS # BLD AUTO: 3.7 K/UL (ref 1.8–7.7)
NEUTROPHILS NFR BLD: 84.1 % (ref 38–73)
NRBC BLD-RTO: 0 /100 WBC
PLATELET # BLD AUTO: 217 K/UL (ref 150–350)
PMV BLD AUTO: 10.6 FL (ref 9.2–12.9)
POCT GLUCOSE: 118 MG/DL (ref 70–110)
POCT GLUCOSE: 167 MG/DL (ref 70–110)
POTASSIUM SERPL-SCNC: 4.2 MMOL/L (ref 3.5–5.1)
PROTHROMBIN TIME: 10.5 SEC (ref 9–12.5)
RBC # BLD AUTO: 3.72 M/UL (ref 4–5.4)
SODIUM SERPL-SCNC: 141 MMOL/L (ref 136–145)
WBC # BLD AUTO: 4.34 K/UL (ref 3.9–12.7)

## 2020-11-09 PROCEDURE — C1751 CATH, INF, PER/CENT/MIDLINE: HCPCS | Performed by: ANESTHESIOLOGY

## 2020-11-09 PROCEDURE — 88313 PR  SPECIAL STAINS,GROUP II: ICD-10-PCS | Mod: 26,,, | Performed by: STUDENT IN AN ORGANIZED HEALTH CARE EDUCATION/TRAINING PROGRAM

## 2020-11-09 PROCEDURE — 27201037 HC PRESSURE MONITORING SET UP

## 2020-11-09 PROCEDURE — 25000003 PHARM REV CODE 250: Performed by: NURSE PRACTITIONER

## 2020-11-09 PROCEDURE — 63600175 PHARM REV CODE 636 W HCPCS: Performed by: NEUROLOGICAL SURGERY

## 2020-11-09 PROCEDURE — 86920 COMPATIBILITY TEST SPIN: CPT

## 2020-11-09 PROCEDURE — 36620 ARTERIAL: ICD-10-PCS | Mod: 59,,, | Performed by: ANESTHESIOLOGY

## 2020-11-09 PROCEDURE — D9220A PRA ANESTHESIA: ICD-10-PCS | Mod: ,,, | Performed by: ANESTHESIOLOGY

## 2020-11-09 PROCEDURE — 99223 1ST HOSP IP/OBS HIGH 75: CPT | Mod: ,,, | Performed by: NURSE PRACTITIONER

## 2020-11-09 PROCEDURE — 99223 PR INITIAL HOSPITAL CARE,LEVL III: ICD-10-PCS | Mod: ,,, | Performed by: PSYCHIATRY & NEUROLOGY

## 2020-11-09 PROCEDURE — C1729 CATH, DRAINAGE: HCPCS | Performed by: NEUROLOGICAL SURGERY

## 2020-11-09 PROCEDURE — S4991 NICOTINE PATCH NONLEGEND: HCPCS | Performed by: NURSE PRACTITIONER

## 2020-11-09 PROCEDURE — 99223 1ST HOSP IP/OBS HIGH 75: CPT | Mod: ,,, | Performed by: PSYCHIATRY & NEUROLOGY

## 2020-11-09 PROCEDURE — 25000003 PHARM REV CODE 250: Performed by: STUDENT IN AN ORGANIZED HEALTH CARE EDUCATION/TRAINING PROGRAM

## 2020-11-09 PROCEDURE — 88313 SPECIAL STAINS GROUP 2: CPT | Mod: 59 | Performed by: STUDENT IN AN ORGANIZED HEALTH CARE EDUCATION/TRAINING PROGRAM

## 2020-11-09 PROCEDURE — 61781 SCAN PROC CRANIAL INTRA: CPT | Mod: ,,, | Performed by: NEUROLOGICAL SURGERY

## 2020-11-09 PROCEDURE — 36620 INSERTION CATHETER ARTERY: CPT | Mod: 59,,, | Performed by: ANESTHESIOLOGY

## 2020-11-09 PROCEDURE — 36000713 HC OR TIME LEV V EA ADD 15 MIN: Performed by: NEUROLOGICAL SURGERY

## 2020-11-09 PROCEDURE — 25000003 PHARM REV CODE 250: Performed by: NEUROLOGICAL SURGERY

## 2020-11-09 PROCEDURE — 36000712 HC OR TIME LEV V 1ST 15 MIN: Performed by: NEUROLOGICAL SURGERY

## 2020-11-09 PROCEDURE — 20000000 HC ICU ROOM

## 2020-11-09 PROCEDURE — 61680 INTRACRANIAL VESSEL SURGERY: CPT | Mod: 62,,, | Performed by: NEUROLOGICAL SURGERY

## 2020-11-09 PROCEDURE — 37000009 HC ANESTHESIA EA ADD 15 MINS: Performed by: NEUROLOGICAL SURGERY

## 2020-11-09 PROCEDURE — 63600175 PHARM REV CODE 636 W HCPCS

## 2020-11-09 PROCEDURE — 85730 THROMBOPLASTIN TIME PARTIAL: CPT

## 2020-11-09 PROCEDURE — 37000008 HC ANESTHESIA 1ST 15 MINUTES: Performed by: NEUROLOGICAL SURGERY

## 2020-11-09 PROCEDURE — 61680 PR BRAIN AVM SURG,SUPRATENT,SIMPLE: ICD-10-PCS | Mod: 62,,, | Performed by: NEUROLOGICAL SURGERY

## 2020-11-09 PROCEDURE — 88307 TISSUE EXAM BY PATHOLOGIST: CPT | Performed by: STUDENT IN AN ORGANIZED HEALTH CARE EDUCATION/TRAINING PROGRAM

## 2020-11-09 PROCEDURE — 80048 BASIC METABOLIC PNL TOTAL CA: CPT

## 2020-11-09 PROCEDURE — 63600175 PHARM REV CODE 636 W HCPCS: Performed by: STUDENT IN AN ORGANIZED HEALTH CARE EDUCATION/TRAINING PROGRAM

## 2020-11-09 PROCEDURE — 94761 N-INVAS EAR/PLS OXIMETRY MLT: CPT

## 2020-11-09 PROCEDURE — 88313 SPECIAL STAINS GROUP 2: CPT | Mod: 26,,, | Performed by: STUDENT IN AN ORGANIZED HEALTH CARE EDUCATION/TRAINING PROGRAM

## 2020-11-09 PROCEDURE — 88307 TISSUE EXAM BY PATHOLOGIST: CPT | Mod: 26,,, | Performed by: STUDENT IN AN ORGANIZED HEALTH CARE EDUCATION/TRAINING PROGRAM

## 2020-11-09 PROCEDURE — 85025 COMPLETE CBC W/AUTO DIFF WBC: CPT

## 2020-11-09 PROCEDURE — 88307 PR  SURG PATH,LEVEL V: ICD-10-PCS | Mod: 26,,, | Performed by: STUDENT IN AN ORGANIZED HEALTH CARE EDUCATION/TRAINING PROGRAM

## 2020-11-09 PROCEDURE — 86901 BLOOD TYPING SEROLOGIC RH(D): CPT

## 2020-11-09 PROCEDURE — 99223 PR INITIAL HOSPITAL CARE,LEVL III: ICD-10-PCS | Mod: ,,, | Performed by: NURSE PRACTITIONER

## 2020-11-09 PROCEDURE — C1713 ANCHOR/SCREW BN/BN,TIS/BN: HCPCS | Performed by: NEUROLOGICAL SURGERY

## 2020-11-09 PROCEDURE — 27200677 HC TRANSDUCER MONITOR KIT SINGLE: Performed by: ANESTHESIOLOGY

## 2020-11-09 PROCEDURE — D9220A PRA ANESTHESIA: Mod: ,,, | Performed by: ANESTHESIOLOGY

## 2020-11-09 PROCEDURE — 27201423 OPTIME MED/SURG SUP & DEVICES STERILE SUPPLY: Performed by: NEUROLOGICAL SURGERY

## 2020-11-09 PROCEDURE — 85610 PROTHROMBIN TIME: CPT

## 2020-11-09 PROCEDURE — 25000003 PHARM REV CODE 250: Performed by: ANESTHESIOLOGY

## 2020-11-09 PROCEDURE — 61781 PR STEREOTACTIC COMP ASSIST PROC,CRANIAL,INTRADURAL: ICD-10-PCS | Mod: ,,, | Performed by: NEUROLOGICAL SURGERY

## 2020-11-09 PROCEDURE — 27100025 HC TUBING, SET FLUID WARMER: Performed by: ANESTHESIOLOGY

## 2020-11-09 DEVICE — PLATE BONE BUR HOLE COVER 10MM: Type: IMPLANTABLE DEVICE | Site: CRANIAL | Status: FUNCTIONAL

## 2020-11-09 DEVICE — SCREW UN3 AXS SD 1.5X3MM: Type: IMPLANTABLE DEVICE | Site: CRANIAL | Status: FUNCTIONAL

## 2020-11-09 DEVICE — PLATE BONE 2X2 HOLE SM BOX: Type: IMPLANTABLE DEVICE | Site: CRANIAL | Status: FUNCTIONAL

## 2020-11-09 DEVICE — SCREW UN3 AXS SD 1.5X4MM: Type: IMPLANTABLE DEVICE | Site: CRANIAL | Status: FUNCTIONAL

## 2020-11-09 RX ORDER — HYDROMORPHONE HYDROCHLORIDE 1 MG/ML
1 INJECTION, SOLUTION INTRAMUSCULAR; INTRAVENOUS; SUBCUTANEOUS EVERY 4 HOURS PRN
Status: DISCONTINUED | OUTPATIENT
Start: 2020-11-09 | End: 2020-11-09

## 2020-11-09 RX ORDER — CEFAZOLIN SODIUM 1 G/3ML
2 INJECTION, POWDER, FOR SOLUTION INTRAMUSCULAR; INTRAVENOUS
Status: DISCONTINUED | OUTPATIENT
Start: 2020-11-09 | End: 2020-11-11

## 2020-11-09 RX ORDER — SODIUM CHLORIDE 9 MG/ML
INJECTION, SOLUTION INTRAVENOUS CONTINUOUS
Status: DISCONTINUED | OUTPATIENT
Start: 2020-11-09 | End: 2020-11-09

## 2020-11-09 RX ORDER — MUPIROCIN 20 MG/G
OINTMENT TOPICAL
Status: DISCONTINUED | OUTPATIENT
Start: 2020-11-09 | End: 2020-11-09 | Stop reason: HOSPADM

## 2020-11-09 RX ORDER — SODIUM CHLORIDE 9 MG/ML
20 INJECTION, SOLUTION INTRAVENOUS CONTINUOUS
Status: DISCONTINUED | OUTPATIENT
Start: 2020-11-09 | End: 2020-11-10

## 2020-11-09 RX ORDER — LAMOTRIGINE 150 MG/1
150 TABLET ORAL NIGHTLY
Status: DISCONTINUED | OUTPATIENT
Start: 2020-11-09 | End: 2020-11-12 | Stop reason: HOSPADM

## 2020-11-09 RX ORDER — LAMOTRIGINE 150 MG/1
450 TABLET ORAL DAILY
Status: DISCONTINUED | OUTPATIENT
Start: 2020-11-10 | End: 2020-11-09

## 2020-11-09 RX ORDER — ONDANSETRON 2 MG/ML
INJECTION INTRAMUSCULAR; INTRAVENOUS
Status: COMPLETED
Start: 2020-11-09 | End: 2020-11-09

## 2020-11-09 RX ORDER — MUPIROCIN 20 MG/G
1 OINTMENT TOPICAL 2 TIMES DAILY
Status: DISCONTINUED | OUTPATIENT
Start: 2020-11-09 | End: 2020-11-09 | Stop reason: HOSPADM

## 2020-11-09 RX ORDER — HYDROCODONE BITARTRATE AND ACETAMINOPHEN 5; 325 MG/1; MG/1
1 TABLET ORAL EVERY 4 HOURS PRN
Status: DISCONTINUED | OUTPATIENT
Start: 2020-11-09 | End: 2020-11-12 | Stop reason: HOSPADM

## 2020-11-09 RX ORDER — HYDROMORPHONE HYDROCHLORIDE 1 MG/ML
0.2 INJECTION, SOLUTION INTRAMUSCULAR; INTRAVENOUS; SUBCUTANEOUS EVERY 4 HOURS PRN
Status: DISCONTINUED | OUTPATIENT
Start: 2020-11-09 | End: 2020-11-12 | Stop reason: HOSPADM

## 2020-11-09 RX ORDER — ACETAMINOPHEN 325 MG/1
650 TABLET ORAL EVERY 6 HOURS PRN
Status: DISCONTINUED | OUTPATIENT
Start: 2020-11-09 | End: 2020-11-09

## 2020-11-09 RX ORDER — LAMOTRIGINE 150 MG/1
300 TABLET ORAL DAILY
Status: DISCONTINUED | OUTPATIENT
Start: 2020-11-10 | End: 2020-11-12 | Stop reason: HOSPADM

## 2020-11-09 RX ORDER — MIDAZOLAM HYDROCHLORIDE 1 MG/ML
INJECTION, SOLUTION INTRAMUSCULAR; INTRAVENOUS
Status: DISCONTINUED | OUTPATIENT
Start: 2020-11-09 | End: 2020-11-09

## 2020-11-09 RX ORDER — LIDOCAINE HYDROCHLORIDE 10 MG/ML
1 INJECTION, SOLUTION EPIDURAL; INFILTRATION; INTRACAUDAL; PERINEURAL ONCE
Status: COMPLETED | OUTPATIENT
Start: 2020-11-09 | End: 2020-11-09

## 2020-11-09 RX ORDER — DEXAMETHASONE SODIUM PHOSPHATE 4 MG/ML
INJECTION, SOLUTION INTRA-ARTICULAR; INTRALESIONAL; INTRAMUSCULAR; INTRAVENOUS; SOFT TISSUE
Status: DISCONTINUED | OUTPATIENT
Start: 2020-11-09 | End: 2020-11-09

## 2020-11-09 RX ORDER — EPHEDRINE SULFATE 50 MG/ML
INJECTION, SOLUTION INTRAVENOUS
Status: DISCONTINUED | OUTPATIENT
Start: 2020-11-09 | End: 2020-11-09

## 2020-11-09 RX ORDER — ROCURONIUM BROMIDE 10 MG/ML
INJECTION, SOLUTION INTRAVENOUS
Status: DISCONTINUED | OUTPATIENT
Start: 2020-11-09 | End: 2020-11-09

## 2020-11-09 RX ORDER — LIDOCAINE HCL/PF 100 MG/5ML
SYRINGE (ML) INTRAVENOUS
Status: DISCONTINUED | OUTPATIENT
Start: 2020-11-09 | End: 2020-11-09

## 2020-11-09 RX ORDER — FENTANYL CITRATE 50 UG/ML
INJECTION, SOLUTION INTRAMUSCULAR; INTRAVENOUS
Status: DISCONTINUED | OUTPATIENT
Start: 2020-11-09 | End: 2020-11-09

## 2020-11-09 RX ORDER — BACITRACIN ZINC 500 UNIT/G
OINTMENT (GRAM) TOPICAL
Status: DISCONTINUED | OUTPATIENT
Start: 2020-11-09 | End: 2020-11-09 | Stop reason: HOSPADM

## 2020-11-09 RX ORDER — CLOBAZAM 10 MG/1
40 TABLET ORAL 2 TIMES DAILY
Status: DISCONTINUED | OUTPATIENT
Start: 2020-11-09 | End: 2020-11-12 | Stop reason: HOSPADM

## 2020-11-09 RX ORDER — SERTRALINE HYDROCHLORIDE 100 MG/1
100 TABLET, FILM COATED ORAL NIGHTLY
Status: DISCONTINUED | OUTPATIENT
Start: 2020-11-09 | End: 2020-11-12 | Stop reason: HOSPADM

## 2020-11-09 RX ORDER — ACETAMINOPHEN 10 MG/ML
INJECTION, SOLUTION INTRAVENOUS
Status: DISCONTINUED | OUTPATIENT
Start: 2020-11-09 | End: 2020-11-09

## 2020-11-09 RX ORDER — LEVETIRACETAM 500 MG/5ML
INJECTION, SOLUTION, CONCENTRATE INTRAVENOUS
Status: DISCONTINUED | OUTPATIENT
Start: 2020-11-09 | End: 2020-11-09

## 2020-11-09 RX ORDER — ONDANSETRON 2 MG/ML
4 INJECTION INTRAMUSCULAR; INTRAVENOUS EVERY 6 HOURS PRN
Status: DISCONTINUED | OUTPATIENT
Start: 2020-11-09 | End: 2020-11-12 | Stop reason: HOSPADM

## 2020-11-09 RX ORDER — ACETAMINOPHEN 650 MG/1
650 SUPPOSITORY RECTAL EVERY 6 HOURS PRN
Status: DISCONTINUED | OUTPATIENT
Start: 2020-11-09 | End: 2020-11-09

## 2020-11-09 RX ORDER — GENTAMICIN SULFATE 40 MG/ML
INJECTION, SOLUTION INTRAMUSCULAR; INTRAVENOUS
Status: DISCONTINUED | OUTPATIENT
Start: 2020-11-09 | End: 2020-11-09 | Stop reason: HOSPADM

## 2020-11-09 RX ORDER — HEPARIN SODIUM 5000 [USP'U]/ML
5000 INJECTION, SOLUTION INTRAVENOUS; SUBCUTANEOUS EVERY 8 HOURS
Status: DISCONTINUED | OUTPATIENT
Start: 2020-11-10 | End: 2020-11-12 | Stop reason: HOSPADM

## 2020-11-09 RX ORDER — IBUPROFEN 200 MG
1 TABLET ORAL DAILY
Status: DISCONTINUED | OUTPATIENT
Start: 2020-11-09 | End: 2020-11-12 | Stop reason: HOSPADM

## 2020-11-09 RX ORDER — PROPOFOL 10 MG/ML
VIAL (ML) INTRAVENOUS
Status: DISCONTINUED | OUTPATIENT
Start: 2020-11-09 | End: 2020-11-09

## 2020-11-09 RX ORDER — PHENYLEPHRINE HYDROCHLORIDE 10 MG/ML
INJECTION INTRAVENOUS
Status: DISCONTINUED | OUTPATIENT
Start: 2020-11-09 | End: 2020-11-09

## 2020-11-09 RX ORDER — ONDANSETRON 2 MG/ML
INJECTION INTRAMUSCULAR; INTRAVENOUS
Status: DISCONTINUED | OUTPATIENT
Start: 2020-11-09 | End: 2020-11-09

## 2020-11-09 RX ORDER — LABETALOL HCL 20 MG/4 ML
10 SYRINGE (ML) INTRAVENOUS EVERY 4 HOURS PRN
Status: DISCONTINUED | OUTPATIENT
Start: 2020-11-09 | End: 2020-11-10

## 2020-11-09 RX ORDER — MUPIROCIN 20 MG/G
OINTMENT TOPICAL 2 TIMES DAILY
Status: DISCONTINUED | OUTPATIENT
Start: 2020-11-09 | End: 2020-11-12 | Stop reason: HOSPADM

## 2020-11-09 RX ORDER — LIDOCAINE HYDROCHLORIDE AND EPINEPHRINE 10; 10 MG/ML; UG/ML
INJECTION, SOLUTION INFILTRATION; PERINEURAL
Status: DISCONTINUED | OUTPATIENT
Start: 2020-11-09 | End: 2020-11-09 | Stop reason: HOSPADM

## 2020-11-09 RX ORDER — ACETAMINOPHEN 325 MG/1
650 TABLET ORAL EVERY 4 HOURS PRN
Status: DISCONTINUED | OUTPATIENT
Start: 2020-11-09 | End: 2020-11-09

## 2020-11-09 RX ORDER — ACETAMINOPHEN 325 MG/1
650 TABLET ORAL EVERY 6 HOURS PRN
Status: DISCONTINUED | OUTPATIENT
Start: 2020-11-09 | End: 2020-11-12 | Stop reason: HOSPADM

## 2020-11-09 RX ADMIN — LAMOTRIGINE 150 MG: 150 TABLET ORAL at 09:11

## 2020-11-09 RX ADMIN — EPHEDRINE SULFATE 5 MG: 50 INJECTION INTRAVENOUS at 07:11

## 2020-11-09 RX ADMIN — PHENYLEPHRINE HYDROCHLORIDE 100 MCG: 10 INJECTION INTRAVENOUS at 07:11

## 2020-11-09 RX ADMIN — ACETAMINOPHEN 1000 MG: 10 INJECTION, SOLUTION INTRAVENOUS at 07:11

## 2020-11-09 RX ADMIN — CLOBAZAM 40 MG: 10 TABLET ORAL at 12:11

## 2020-11-09 RX ADMIN — ROCURONIUM BROMIDE 10 MG: 10 INJECTION, SOLUTION INTRAVENOUS at 09:11

## 2020-11-09 RX ADMIN — EPHEDRINE SULFATE 10 MG: 50 INJECTION INTRAVENOUS at 08:11

## 2020-11-09 RX ADMIN — FENTANYL CITRATE 100 MCG: 50 INJECTION, SOLUTION INTRAMUSCULAR; INTRAVENOUS at 07:11

## 2020-11-09 RX ADMIN — SODIUM CHLORIDE, SODIUM GLUCONATE, SODIUM ACETATE, POTASSIUM CHLORIDE, MAGNESIUM CHLORIDE, SODIUM PHOSPHATE, DIBASIC, AND POTASSIUM PHOSPHATE: .53; .5; .37; .037; .03; .012; .00082 INJECTION, SOLUTION INTRAVENOUS at 07:11

## 2020-11-09 RX ADMIN — MUPIROCIN: 20 OINTMENT TOPICAL at 05:11

## 2020-11-09 RX ADMIN — HYDROCODONE BITARTRATE AND ACETAMINOPHEN 1 TABLET: 5; 325 TABLET ORAL at 05:11

## 2020-11-09 RX ADMIN — HYDROCODONE BITARTRATE AND ACETAMINOPHEN 1 TABLET: 5; 325 TABLET ORAL at 12:11

## 2020-11-09 RX ADMIN — ONDANSETRON: 2 INJECTION INTRAMUSCULAR; INTRAVENOUS at 12:11

## 2020-11-09 RX ADMIN — CEFTRIAXONE SODIUM 2 G: 1 INJECTION, POWDER, FOR SOLUTION INTRAMUSCULAR; INTRAVENOUS at 07:11

## 2020-11-09 RX ADMIN — PHENYLEPHRINE HYDROCHLORIDE 100 MCG: 10 INJECTION INTRAVENOUS at 08:11

## 2020-11-09 RX ADMIN — NICOTINE 1 PATCH: 14 PATCH TRANSDERMAL at 01:11

## 2020-11-09 RX ADMIN — SERTRALINE HYDROCHLORIDE 100 MG: 100 TABLET ORAL at 09:11

## 2020-11-09 RX ADMIN — PHENYLEPHRINE HYDROCHLORIDE 50 MCG: 10 INJECTION INTRAVENOUS at 10:11

## 2020-11-09 RX ADMIN — MIDAZOLAM HYDROCHLORIDE 2 MG: 1 INJECTION, SOLUTION INTRAMUSCULAR; INTRAVENOUS at 07:11

## 2020-11-09 RX ADMIN — LIDOCAINE HYDROCHLORIDE 100 MG: 20 INJECTION, SOLUTION INTRAVENOUS at 07:11

## 2020-11-09 RX ADMIN — MUPIROCIN: 20 OINTMENT TOPICAL at 12:11

## 2020-11-09 RX ADMIN — ONDANSETRON 4 MG: 2 INJECTION INTRAMUSCULAR; INTRAVENOUS at 10:11

## 2020-11-09 RX ADMIN — SODIUM CHLORIDE: 0.9 INJECTION, SOLUTION INTRAVENOUS at 05:11

## 2020-11-09 RX ADMIN — HYDROMORPHONE HYDROCHLORIDE 1 MG: 1 INJECTION, SOLUTION INTRAMUSCULAR; INTRAVENOUS; SUBCUTANEOUS at 12:11

## 2020-11-09 RX ADMIN — EPHEDRINE SULFATE 10 MG: 50 INJECTION INTRAVENOUS at 07:11

## 2020-11-09 RX ADMIN — LEVETIRACETAM 1000 MG: 100 INJECTION, SOLUTION, CONCENTRATE INTRAVENOUS at 07:11

## 2020-11-09 RX ADMIN — PHENYLEPHRINE HYDROCHLORIDE 50 MCG: 10 INJECTION INTRAVENOUS at 08:11

## 2020-11-09 RX ADMIN — MUPIROCIN: 20 OINTMENT TOPICAL at 09:11

## 2020-11-09 RX ADMIN — CEFAZOLIN 2 G: 1 INJECTION, POWDER, FOR SOLUTION INTRAMUSCULAR; INTRAVENOUS at 12:11

## 2020-11-09 RX ADMIN — ONDANSETRON: 2 INJECTION, SOLUTION INTRAMUSCULAR; INTRAVENOUS at 12:11

## 2020-11-09 RX ADMIN — PROPOFOL 160 MG: 10 INJECTION, EMULSION INTRAVENOUS at 07:11

## 2020-11-09 RX ADMIN — PHENYLEPHRINE HYDROCHLORIDE 50 MCG: 10 INJECTION INTRAVENOUS at 09:11

## 2020-11-09 RX ADMIN — SODIUM CHLORIDE: 0.9 INJECTION, SOLUTION INTRAVENOUS at 07:11

## 2020-11-09 RX ADMIN — ROCURONIUM BROMIDE 20 MG: 10 INJECTION, SOLUTION INTRAVENOUS at 08:11

## 2020-11-09 RX ADMIN — HYDROMORPHONE HYDROCHLORIDE 0.2 MG: 1 INJECTION, SOLUTION INTRAMUSCULAR; INTRAVENOUS; SUBCUTANEOUS at 07:11

## 2020-11-09 RX ADMIN — CLOBAZAM 40 MG: 10 TABLET ORAL at 09:11

## 2020-11-09 RX ADMIN — ROCURONIUM BROMIDE 50 MG: 10 INJECTION, SOLUTION INTRAVENOUS at 07:11

## 2020-11-09 RX ADMIN — LIDOCAINE HYDROCHLORIDE 5 MG: 10 INJECTION, SOLUTION EPIDURAL; INFILTRATION; INTRACAUDAL at 05:11

## 2020-11-09 RX ADMIN — EPHEDRINE SULFATE 5 MG: 50 INJECTION INTRAVENOUS at 09:11

## 2020-11-09 RX ADMIN — SODIUM CHLORIDE, SODIUM GLUCONATE, SODIUM ACETATE, POTASSIUM CHLORIDE, MAGNESIUM CHLORIDE, SODIUM PHOSPHATE, DIBASIC, AND POTASSIUM PHOSPHATE: .53; .5; .37; .037; .03; .012; .00082 INJECTION, SOLUTION INTRAVENOUS at 08:11

## 2020-11-09 RX ADMIN — CEFAZOLIN 2 G: 1 INJECTION, POWDER, FOR SOLUTION INTRAMUSCULAR; INTRAVENOUS at 09:11

## 2020-11-09 RX ADMIN — SODIUM CHLORIDE, SODIUM GLUCONATE, SODIUM ACETATE, POTASSIUM CHLORIDE, MAGNESIUM CHLORIDE, SODIUM PHOSPHATE, DIBASIC, AND POTASSIUM PHOSPHATE: .53; .5; .37; .037; .03; .012; .00082 INJECTION, SOLUTION INTRAVENOUS at 10:11

## 2020-11-09 RX ADMIN — DEXAMETHASONE SODIUM PHOSPHATE 8 MG: 4 INJECTION, SOLUTION INTRAMUSCULAR; INTRAVENOUS at 07:11

## 2020-11-09 RX ADMIN — ONDANSETRON 4 MG: 2 INJECTION INTRAMUSCULAR; INTRAVENOUS at 12:11

## 2020-11-09 RX ADMIN — HYDROCODONE BITARTRATE AND ACETAMINOPHEN 1 TABLET: 5; 325 TABLET ORAL at 09:11

## 2020-11-09 RX ADMIN — FENTANYL CITRATE 50 MCG: 50 INJECTION, SOLUTION INTRAMUSCULAR; INTRAVENOUS at 08:11

## 2020-11-09 NOTE — ANESTHESIA PROCEDURE NOTES
Intubation  Performed by: Dakota Koch MD  Authorized by: Victorino Vicente Jr., MD     Intubation:     Induction:  Intravenous    Intubated:  Postinduction    Mask Ventilation:  Easy mask    Attempts:  1    Attempted By:  Resident anesthesiologist    Method of Intubation:  Direct    Blade:  Do 2    Laryngeal View Grade: Grade I - full view of chords      Difficult Airway Encountered?: No      Complications:  None    Airway Device:  Oral endotracheal tube    Airway Device Size:  7.5    Style/Cuff Inflation:  Cuffed    Secured at:  The lips    Placement Verified By:  Capnometry    Complicating Factors:  None    Findings Post-Intubation:  BS equal bilateral

## 2020-11-09 NOTE — PLAN OF CARE
11/09/20 1134   Post-Acute Status   Post-Acute Authorization Placement   Post-Acute Placement Status Awaiting Internal Medical Clearance     Amparo Agosto LCSW  Neurocritical Care   [facility]  [id number]

## 2020-11-09 NOTE — ASSESSMENT & PLAN NOTE
Admitted to [facility] s/p R temporal craniotomy for resection of cavernoma 11/9    - NSGY following, repeat imaging timing per [facility]/NSGY  - Neurochecks  - Subgaleal drain in place  - PT/OT/SLP pending

## 2020-11-09 NOTE — SUBJECTIVE & OBJECTIVE
Past Medical History:   Diagnosis Date    ADHD (attention deficit hyperactivity disorder)     Anxiety and depression 7/5/2019    Complex partial epilepsy with generalization and with intractable epilepsy 2012         HSV-1 infection     HSV-2 infection     Neuromuscular disorder     Overdose of illicit drug     PTSD (post-traumatic stress disorder)        Past Surgical History:   Procedure Laterality Date    HYSTERECTOMY  2011    endometriosis    OOPHORECTOMY  2011    ovarian cyst    wisdom teeth removal         Review of patient's allergies indicates:  No Known Allergies    No current facility-administered medications on file prior to encounter.      Current Outpatient Medications on File Prior to Encounter   Medication Sig    cloBAZam (ONFI) 20 mg Tab Take 2 tablets (40 mg total) by mouth 2 (two) times daily.    lamoTRIgine (LAMICTAL) 150 MG Tab Take 3 tablets (450 mg total) by mouth once daily. (300mg in the morning, 150mg at night) (Patient taking differently: Take 450 mg by mouth once daily. (300mg at night , 150mg in the morning ))    sertraline (ZOLOFT) 100 MG tablet Take 1 tablet (100 mg total) by mouth once daily. (Patient taking differently: Take 100 mg by mouth every evening. )    valACYclovir (VALTREX) 1000 MG tablet Take 1,000 mg by mouth 2 (two) times daily. Taking prn     Continuous Infusions:   sodium chloride 0.9%         Family History     Family history is unknown by patient.        Tobacco Use    Smoking status: Current Every Day Smoker     Packs/day: 1.00     Types: Cigarettes     Start date: 7/29/1998    Smokeless tobacco: Never Used   Substance and Sexual Activity    Alcohol use: Yes     Frequency: Monthly or less     Drinks per session: 3 or 4     Comment: once a month    Drug use: Yes     Types: Marijuana    Sexual activity: Not Currently     Partners: Male     Birth control/protection: See Surgical Hx, Surgical     Comment:      Review of Systems    Constitutional: Positive for fatigue. Negative for fever.   Eyes: Negative for photophobia and visual disturbance.   Respiratory: Negative for cough and shortness of breath.    Cardiovascular: Negative for chest pain and leg swelling.   Gastrointestinal: Positive for nausea and vomiting.   Skin: Negative for pallor and rash.   Neurological: Positive for headaches. Negative for facial asymmetry and speech difficulty.   Psychiatric/Behavioral: Negative for agitation and confusion.     Objective:     Vital Signs (Most Recent):  Temp: 98.6 °F (37 °C) (11/09/20 1145)  Pulse: 65 (11/09/20 1300)  Resp: (!) 37 (11/09/20 1300)  BP: (!) 89/52 (11/09/20 1300)  SpO2: (!) 91 % (11/09/20 1300) Vital Signs (24h Range):  Temp:  [97.6 °F (36.4 °C)-98.6 °F (37 °C)] 98.6 °F (37 °C)  Pulse:  [64-85] 65  Resp:  [12-37] 37  SpO2:  [91 %-100 %] 91 %  BP: ()/(52-61) 89/52     Weight: 63.5 kg (140 lb)  Body mass index is 23.3 kg/m².    Physical Exam  Vitals signs and nursing note reviewed.   Constitutional:       Appearance: Normal appearance. She is not ill-appearing, toxic-appearing or diaphoretic.   HENT:      Head:      Comments: Surgical incision to R scalp, drain in place     Nose: Nose normal.   Eyes:      General: No scleral icterus.     Extraocular Movements: Extraocular movements intact.   Pulmonary:      Effort: Pulmonary effort is normal. No respiratory distress.   Abdominal:      General: There is no distension.      Palpations: Abdomen is soft.   Musculoskeletal:         General: No deformity or signs of injury.   Skin:     General: Skin is warm and dry.   Neurological:      Mental Status: She is lethargic.         NEUROLOGICAL EXAMINATION:     MENTAL STATUS        Lethargic, oriented to family in room, place, able to name children  Moves all extremities spontaneously       Significant Labs: All pertinent lab results from the past 24 hours have been reviewed.    Significant Studies: I have reviewed all pertinent  imaging results/findings within the past 24 hours.

## 2020-11-09 NOTE — ANESTHESIA PROCEDURE NOTES
Arterial    Diagnosis: Epilepsy    Patient location during procedure: done in OR  Procedure start time: 11/9/2020 7:20 AM  Timeout: 11/9/2020 7:20 AM  Procedure end time: 11/9/2020 7:25 AM    Staffing  Authorizing Provider: Victorino Vicente Jr., MD  Performing Provider: Dakota Koch MD    Anesthesiologist was present at the time of the procedure.    Preanesthetic Checklist  Completed: patient identified, site marked, surgical consent, pre-op evaluation, timeout performed, IV checked, risks and benefits discussed, monitors and equipment checked and anesthesia consent givenArterial  Skin Prep: chlorhexidine gluconate and isopropyl alcohol  Local Infiltration: none  Orientation: right  Location: radial  Catheter placement by Ultrasound guidance. Heme positive aspiration all ports.  Vessel Caliber: medium, patent, compressibility normal  Needle advanced into vessel with real time Ultrasound guidance.Insertion Attempts: 2  Assessment  Dressing: secured with tape and tegaderm  Patient: Tolerated well

## 2020-11-09 NOTE — HOSPITAL COURSE
11/09/2020: s/p R crani for resection of R temporal lobe cavernoma.  11/10: POD1. NAEO subgaleal drain removed today  11/11/2020: TRISTIAN. Step down to NSGY today. Pt/Ot to evaluate and treat.

## 2020-11-09 NOTE — CONSULTS
[facility]  Neurology-Epilepsy  Consult Note    Patient Name: Mary Haywood  MRN: [id number]  Admission Date: 11/9/2020  Hospital Length of Stay: 0 days  Code Status: Prior   Attending Provider: Nasra Wetzel MD   Consulting Provider: Hilda Hamm PA-C  Primary Care Physician: Primary Doctor No  Principal Problem:Cavernoma    Inpatient consult to Neurology  Consult performed by: Hilda Hamm PA-C  Consult ordered by: Lee Wilkes MD         Subjective:     HPI:   Ms. Haywood is a 34 yo female with significant past medical history of temporal lobe epilepsy, anxiety and depression admitted to [facility] s/p R temporal craniotomy for resection of vascular malformation/cavernoma and surrounding area of hemosiderin. Patients epilepsy has been refractory to multiple medication combinations, presented at multidisciplinary Epilepsy Surgery conference and recommendation made to resect the cavernoma. Patient admitted to NICU 11/9 post-operatively, Neurology Epilepsy consulted for assistance in AED management.    Hospital Course:   No notes on file     Past Medical History:   Diagnosis Date    ADHD (attention deficit hyperactivity disorder)     Anxiety and depression 7/5/2019    Complex partial epilepsy with generalization and with intractable epilepsy 2012         HSV-1 infection     HSV-2 infection     Neuromuscular disorder     Overdose of illicit drug     PTSD (post-traumatic stress disorder)        Past Surgical History:   Procedure Laterality Date    HYSTERECTOMY  2011    endometriosis    OOPHORECTOMY  2011    ovarian cyst    wisdom teeth removal         Review of patient's allergies indicates:  No Known Allergies    No current facility-administered medications on file prior to encounter.      Current Outpatient Medications on File Prior to Encounter   Medication Sig    cloBAZam (ONFI) 20 mg Tab Take 2 tablets (40 mg total) by mouth 2 (two) times daily.    lamoTRIgine (LAMICTAL) 150 MG Tab  Take 3 tablets (450 mg total) by mouth once daily. (300mg in the morning, 150mg at night) (Patient taking differently: Take 450 mg by mouth once daily. (300mg at night , 150mg in the morning ))    sertraline (ZOLOFT) 100 MG tablet Take 1 tablet (100 mg total) by mouth once daily. (Patient taking differently: Take 100 mg by mouth every evening. )    valACYclovir (VALTREX) 1000 MG tablet Take 1,000 mg by mouth 2 (two) times daily. Taking prn     Continuous Infusions:   sodium chloride 0.9%         Family History     Family history is unknown by patient.        Tobacco Use    Smoking status: Current Every Day Smoker     Packs/day: 1.00     Types: Cigarettes     Start date: 7/29/1998    Smokeless tobacco: Never Used   Substance and Sexual Activity    Alcohol use: Yes     Frequency: Monthly or less     Drinks per session: 3 or 4     Comment: once a month    Drug use: Yes     Types: Marijuana    Sexual activity: Not Currently     Partners: Male     Birth control/protection: See Surgical Hx, Surgical     Comment:      Review of Systems   Constitutional: Positive for fatigue. Negative for fever.   Eyes: Negative for photophobia and visual disturbance.   Respiratory: Negative for cough and shortness of breath.    Cardiovascular: Negative for chest pain and leg swelling.   Gastrointestinal: Positive for nausea and vomiting.   Skin: Negative for pallor and rash.   Neurological: Positive for headaches. Negative for facial asymmetry and speech difficulty.   Psychiatric/Behavioral: Negative for agitation and confusion.     Objective:     Vital Signs (Most Recent):  Temp: 98.6 °F (37 °C) (11/09/20 1145)  Pulse: 65 (11/09/20 1300)  Resp: (!) 37 (11/09/20 1300)  BP: (!) 89/52 (11/09/20 1300)  SpO2: (!) 91 % (11/09/20 1300) Vital Signs (24h Range):  Temp:  [97.6 °F (36.4 °C)-98.6 °F (37 °C)] 98.6 °F (37 °C)  Pulse:  [64-85] 65  Resp:  [12-37] 37  SpO2:  [91 %-100 %] 91 %  BP: ()/(52-61) 89/52     Weight: 63.5 kg  (140 lb)  Body mass index is 23.3 kg/m².    Physical Exam  Vitals signs and nursing note reviewed.   Constitutional:       Appearance: Normal appearance. She is not ill-appearing, toxic-appearing or diaphoretic.   HENT:      Head:      Comments: Surgical incision to R scalp, drain in place     Nose: Nose normal.   Eyes:      General: No scleral icterus.     Extraocular Movements: Extraocular movements intact.   Pulmonary:      Effort: Pulmonary effort is normal. No respiratory distress.   Abdominal:      General: There is no distension.      Palpations: Abdomen is soft.   Musculoskeletal:         General: No deformity or signs of injury.   Skin:     General: Skin is warm and dry.   Neurological:      Mental Status: She is lethargic.         NEUROLOGICAL EXAMINATION:     MENTAL STATUS        Lethargic, oriented to family in room, place, able to name children  Moves all extremities spontaneously       Significant Labs: All pertinent lab results from the past 24 hours have been reviewed.    Significant Studies: I have reviewed all pertinent imaging results/findings within the past 24 hours.    Assessment and Plan:     * Cavernoma  Admitted to [facility] s/p R temporal craniotomy for resection of cavernoma 11/9    - NSGY following, repeat imaging timing per NCC/NSGY  - Neurochecks  - Subgaleal drain in place  - PT/OT/SLP pending    Intractable epilepsy  S/p R temporal crani for cavernoma resection 11/9, follows with Dr. Hennessy as outpatient. Presented at multidisciplinary epilepsy surgery conference, recommendation made to proceed with cavernoma resection.    Recommendations:  - Continue outpatient AED regimen - Clobazam 40 mg BID, Lamotrigine 300 mg qAM/150 mg qPM  - Seizure precautions  - Avoid agents that lower seizure threshold  - Post-operative imaging, management per NSGY/NCC    Plan of care discussed with NCC team, will continue to follow. Please call with any questions.    Tobacco use  - Nicotine patch as  needed    Anxiety and depression  - Continue home Sertraline        VTE Risk Mitigation (From admission, onward)         Ordered     heparin (porcine) injection 5,000 Units  Every 8 hours      11/09/20 1145     IP VTE HIGH RISK PATIENT  Once      11/09/20 1145     Place sequential compression device  Until discontinued      11/09/20 1145     Place sequential compression device  Until discontinued      11/09/20 0547                Thank you for your consult. I will follow-up with patient. Please contact us if you have any additional questions.    Hilda Hamm PA-C  Neurology-Epilepsy  [facility]  Staff: Dr. Christie

## 2020-11-09 NOTE — HPI
Patient is a 35 y.o. 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 Will admit to [facility] for post-op monitoring.

## 2020-11-09 NOTE — ANESTHESIA POSTPROCEDURE EVALUATION
Anesthesia Post Evaluation    Patient: Mary Haywood    Procedure(s) Performed: Procedure(s) (LRB):  CRANIOTOMY, USING FRAMELESS STEREOTAXY FOR  RIGHT SIDED RESECTION OF CAVERNOMA (Right)    Final Anesthesia Type: general    Patient location during evaluation: ICU  Patient participation: Yes- Able to Participate  Level of consciousness: awake and alert and oriented  Post-procedure vital signs: reviewed and stable  Pain management: adequate  Airway patency: patent    PONV status at discharge: nausea (controlled) and vomiting (controlled)  Anesthetic complications: no      Cardiovascular status: blood pressure returned to baseline, hemodynamically stable and stable  Respiratory status: unassisted, room air and spontaneous ventilation  Hydration status: euvolemic  Follow-up not needed.          Vitals Value Taken Time   BP 89/52 11/09/20 1301   Temp 37 °C (98.6 °F) 11/09/20 1145   Pulse 66 11/09/20 1342   Resp 19 11/09/20 1342   SpO2 92 % 11/09/20 1342   Vitals shown include unvalidated device data.      No case tracking events are documented in the log.      Pain/David Score: Pain Rating Prior to Med Admin: 10 (11/9/2020 12:42 PM)

## 2020-11-09 NOTE — H&P
History and Physical  Neurosurgery    Admit Date: 11/9/2020  LOS: 0    Code Status: Prior     CC: <principal problem not specified>    SUBJECTIVE:     History of Present Illness: 34 yo female with known cavernoma presenting for elective resection.    Neurologically stable on exam.           OBJECTIVE:   Vital Signs (Most Recent):   Temp: 97.6 °F (36.4 °C) (11/09/20 0521)  Pulse: 64 (11/09/20 0521)  Resp: 16 (11/09/20 0521)  BP: (!) 100/53 (11/09/20 0521)  SpO2: 98 % (11/09/20 0521)    Vital Signs (24h Range):   Temp:  [97.6 °F (36.4 °C)] 97.6 °F (36.4 °C)  Pulse:  [64] 64  Resp:  [16] 16  SpO2:  [98 %] 98 %  BP: (100)/(53) 100/53      I & O (Last 24h):  No intake or output data in the 24 hours ending 11/09/20 0604  PHysical Exam:  GCS [id number]    Pupils: Equal and Reactive.   Extraocular Movements: Conjugate, normal movement.      Motor Exam:  Left Upper Extremity:Follows Commands.  Right Upper Extremity: Follows Commands.  Left Lower Extremity: Follows Commands.  Right Lower Extremity: Follows Commands.              Lines/Drains/Airway:          Nutrition/Tube Feeds:   Current Diet Order   Procedures    Diet NPO       Labs:  ABG: No results for input(s): PH, PO2, PCO2, HCO3, POCSATURATED, BE in the last 24 hours.  BMP:No results for input(s): NA, K, CL, CO2, BUN, CREATININE, GLU, MG, PHOS in the last 24 hours.  LFT:   Lab Results   Component Value Date    AST 11 10/23/2020    ALT 8 (L) 10/23/2020    ALKPHOS 83 10/23/2020    BILITOT 0.4 10/23/2020    ALBUMIN 3.8 10/23/2020    PROT 6.5 10/23/2020     CBC:   Lab Results   Component Value Date    WBC 7.12 10/23/2020    HGB 13.3 10/23/2020    HCT 39.0 10/23/2020     (H) 10/23/2020     10/23/2020     Microbiology x 7d:   Microbiology Results (last 7 days)     ** No results found for the last 168 hours. **            ASSESSMENT/PLAN:   34 yo female with known cavernoma presenting for elective resection.    --To OR for resection.  --We will continue to monitor  closely, please contact us with any questions or concerns.    Lee Wilkes

## 2020-11-09 NOTE — PROGRESS NOTES
Patient arrived to [facility] from OR to 9090  Type of stroke/diagnosis:  S/p resection of cavernoma w/ accodian drain placement       Current symptoms: lethargy s/p surgery      Skin assessment done: Yes  Wounds noted: none     Patient Belongings on Admit: Father brought all belongings     NCC notified:Mandy Shore NP

## 2020-11-09 NOTE — HPI
Ms. Haywood is a 34 yo female with significant past medical history of temporal lobe epilepsy, anxiety and depression admitted to [facility] s/p R temporal craniotomy for resection of vascular malformation/cavernoma and surrounding area of hemosiderin. Patients epilepsy has been refractory to multiple medication combinations, presented at multidisciplinary Epilepsy Surgery conference and recommendation made to resect the cavernoma. Patient admitted to NICU 11/9 post-operatively, Neurology Epilepsy consulted for assistance in AED management.

## 2020-11-09 NOTE — OP NOTE
[facility]  Neurosurgery  Operative Note    OP Note      Date of Procedure: 11/9/2020       Pre-Operative Diagnosis: Intractable seizures [G40.919]    Post-Operative Diagnosis: Post-Op Diagnosis Codes:     * Intractable seizures [G40.919]    Anesthesia: General    Procedures performed:  Right temporal craniotomy for resection of vascular malformation/cavernoma and surrounding area of hemosiderin with  use of neuro navigation     Surgeon: Richard Cortes MD    Co-Surgeon::  Nasra Wetzel MD    Two staff neurosurgery needed for this complex operation which is a vascular procedure and epilepsy procedure    Resident assist:  Lee Montesinos MD    Indication for Procedure:  This is a 35-year-old with a history of intractable epilepsy was found have an associated cavernoma with hemosiderin ring epilepsy seem to localized to this area we felt patient would benefit from resection of the lesion.    Operative Note:  Patient undergone a preop and navigation scan was brought to operating room anesthetized and intubated by anesthesia placed in Ramos head pin head turned to the left.  Navigation system was registered using the facial registration.  The head was shaved prepped and draped sterile fashion we localized lesion with navigation system then turned a small temporal flap opening the scalp flap and temporalis down together.  We got down to the root of the zygoma.  Get down before the temporal fossa.  We then placed a bur hole the for the temporal fossa internally temporal craniotomy that went frontally to slightly.  Dura was flapped toward the sphenoid ridge and then using navigation we pinpoint location of the cavernoma.  It was toward the anterior temporal tip.  We then made a small corticectomy in the middle temporal gyrus and went down and quickly got into the cavernoma and the vascular malformation.  We did very careful dissections circumferentially around the vascular malformation of the able to  ultimately resected on block.  We then resected the hemosiderin ring around the lesion then spent quite a bit of time obtaining hemostasis.  We placed FloSeal and then Gelfoam with thrombin.  The we then reinspected the surgical bed once were happy with that we then irrigated out and reapproximated the dura primarily we fixated the bone flap using titanium plates and screws after supplement the dura closure with a piece of Gelfoam.  The temporalis in the rest of the wound was closed in layers.  Sterile dressing put in place patient extubated taken out of Ramos head pin brought to the neuro ICU without any problems or complication.    EBL:  Minimal  Specimen Sent:  vascular malformation

## 2020-11-09 NOTE — SUBJECTIVE & OBJECTIVE
Past Medical History:   Diagnosis Date    ADHD (attention deficit hyperactivity disorder)     Anxiety and depression 7/5/2019    Complex partial epilepsy with generalization and with intractable epilepsy 2012         HSV-1 infection     HSV-2 infection     Neuromuscular disorder     Overdose of illicit drug     PTSD (post-traumatic stress disorder)      Past Surgical History:   Procedure Laterality Date    HYSTERECTOMY  2011    endometriosis    OOPHORECTOMY  2011    ovarian cyst    wisdom teeth removal       No current facility-administered medications on file prior to encounter.      Current Outpatient Medications on File Prior to Encounter   Medication Sig Dispense Refill    cloBAZam (ONFI) 20 mg Tab Take 2 tablets (40 mg total) by mouth 2 (two) times daily. 120 tablet 5    lamoTRIgine (LAMICTAL) 150 MG Tab Take 3 tablets (450 mg total) by mouth once daily. (300mg in the morning, 150mg at night) (Patient taking differently: Take 450 mg by mouth once daily. (300mg at night , 150mg in the morning )) 270 tablet 3    sertraline (ZOLOFT) 100 MG tablet Take 1 tablet (100 mg total) by mouth once daily. (Patient taking differently: Take 100 mg by mouth every evening. ) 90 tablet 3    valACYclovir (VALTREX) 1000 MG tablet Take 1,000 mg by mouth 2 (two) times daily. Taking prn  0     Allergies: Patient has no known allergies.    Family History   Adopted: Yes   Family history unknown: Yes       Social History     Tobacco Use    Smoking status: Current Every Day Smoker     Packs/day: 1.00     Types: Cigarettes     Start date: 7/29/1998    Smokeless tobacco: Never Used   Substance Use Topics    Alcohol use: Yes     Frequency: Monthly or less     Drinks per session: 3 or 4     Comment: once a month    Drug use: Yes     Types: Marijuana      Review of Systems: Review of Systems   Constitutional: Negative for chills, fever and weight loss.   HENT: Negative for congestion, nosebleeds and sinus pain.    Eyes:  Negative for blurred vision, double vision and photophobia.   Respiratory: Negative for cough, sputum production and shortness of breath.    Cardiovascular: Negative for chest pain, palpitations and leg swelling.   Gastrointestinal: Positive for nausea and vomiting. Negative for heartburn.   Genitourinary: Negative for dysuria, frequency and urgency.   Musculoskeletal: Negative for back pain, joint pain and neck pain.   Skin: Negative for itching and rash.   Neurological: Positive for headaches. Negative for dizziness, sensory change and speech change.     Vitals:   Temp: 98.6 °F (37 °C)  Pulse: 85  BP: 110/61  Resp: 18  SpO2: 100 %    Temp  Min: 97.6 °F (36.4 °C)  Max: 98.6 °F (37 °C)  Pulse  Min: 64  Max: 85  BP  Min: 100/53  Max: 110/61  Resp  Min: 12  Max: 30  SpO2  Min: 98 %  Max: 100 %    No intake/output data recorded.       Examination:   Constitutional: Well-nourished and -developed. No apparent distress.   Eyes: Conjunctiva clear, anicteric. Lids no lesions.  Head/Ears/Nose/Mouth/Throat/Neck: Surgical incision to R scalp. Moist mucous membranes. External ears, nose atraumatic.   Cardiovascular: Regular rhythm. No leg edema.  Respiratory: Comfortable respirations. Clear to auscultation.  Gastrointestinal: Soft, nondistended, nontender. + bowel sounds.    Neurologic:   -E 3V 5 M 6  -Drowsy. Oriented to person, place, and time. Speech fluent. Follows commands. Recent and remote memory adequate. Judgment good. Insight appropriate.  -Cranial nerves: EOM intact, PERRL 4mm, no facial droop, +cough  -Strength: Moves all extremities spontaneously, antigravity  -Sensation: Intact to light touch.    Today I independently reviewed pertinent medications, lines/drains/airways, imaging, cardiology results, laboratory results, notably:     CT Head, MRI Brain  CBC, CMP

## 2020-11-09 NOTE — H&P
[facility]  Neurocritical Care  History & Physical    Admit Date: 11/9/2020  Service Date: 11/09/2020  Length of Stay: 0    Subjective:     Chief Complaint: Cavernoma    History of Present Illness: Patient is a 35 y.o. 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 Will admit to [facility] for post-op monitoring.    Past Medical History:   Diagnosis Date    ADHD (attention deficit hyperactivity disorder)     Anxiety and depression 7/5/2019    Complex partial epilepsy with generalization and with intractable epilepsy 2012         HSV-1 infection     HSV-2 infection     Neuromuscular disorder     Overdose of illicit drug     PTSD (post-traumatic stress disorder)      Past Surgical History:   Procedure Laterality Date    HYSTERECTOMY  2011    endometriosis    OOPHORECTOMY  2011    ovarian cyst    wisdom teeth removal       No current facility-administered medications on file prior to encounter.      Current Outpatient Medications on File Prior to Encounter   Medication Sig Dispense Refill    cloBAZam (ONFI) 20 mg Tab Take 2 tablets (40 mg total) by mouth 2 (two) times daily. 120 tablet 5    lamoTRIgine (LAMICTAL) 150 MG Tab Take 3 tablets (450 mg total) by mouth once daily. (300mg in the morning, 150mg at night) (Patient taking differently: Take 450 mg by mouth once daily. (300mg at night , 150mg in the morning )) 270 tablet 3    sertraline (ZOLOFT) 100 MG tablet Take 1 tablet (100 mg total) by mouth once daily. (Patient taking differently: Take 100 mg by mouth every evening. ) 90 tablet 3    valACYclovir (VALTREX) 1000 MG tablet Take 1,000 mg by mouth 2 (two) times daily. Taking prn  0     Allergies: Patient has no known allergies.    Family History   Adopted: Yes   Family history unknown: Yes       Social History     Tobacco Use    Smoking status: Current Every Day Smoker     Packs/day: 1.00     Types: Cigarettes     Start date:  7/29/1998    Smokeless tobacco: Never Used   Substance Use Topics    Alcohol use: Yes     Frequency: Monthly or less     Drinks per session: 3 or 4     Comment: once a month    Drug use: Yes     Types: Marijuana      Review of Systems: Review of Systems   Constitutional: Negative for chills, fever and weight loss.   HENT: Negative for congestion, nosebleeds and sinus pain.    Eyes: Negative for blurred vision, double vision and photophobia.   Respiratory: Negative for cough, sputum production and shortness of breath.    Cardiovascular: Negative for chest pain, palpitations and leg swelling.   Gastrointestinal: Positive for nausea and vomiting. Negative for heartburn.   Genitourinary: Negative for dysuria, frequency and urgency.   Musculoskeletal: Negative for back pain, joint pain and neck pain.   Skin: Negative for itching and rash.   Neurological: Positive for headaches. Negative for dizziness, sensory change and speech change.     Vitals:   Temp: 98.6 °F (37 °C)  Pulse: 85  BP: 110/61  Resp: 18  SpO2: 100 %    Temp  Min: 97.6 °F (36.4 °C)  Max: 98.6 °F (37 °C)  Pulse  Min: 64  Max: 85  BP  Min: 100/53  Max: 110/61  Resp  Min: 12  Max: 30  SpO2  Min: 98 %  Max: 100 %    No intake/output data recorded.       Examination:   Constitutional: Well-nourished and -developed. No apparent distress.   Eyes: Conjunctiva clear, anicteric. Lids no lesions.  Head/Ears/Nose/Mouth/Throat/Neck: Surgical incision to R scalp. Moist mucous membranes. External ears, nose atraumatic.   Cardiovascular: Regular rhythm. No leg edema.  Respiratory: Comfortable respirations. Clear to auscultation.  Gastrointestinal: Soft, nondistended, nontender. + bowel sounds.    Neurologic:   -E 3V 5 M 6  -Drowsy. Oriented to person, place, and time. Speech fluent. Follows commands. Recent and remote memory adequate. Judgment good. Insight appropriate.  -Cranial nerves: EOM intact, PERRL 4mm, no facial droop, +cough  -Strength: Moves all extremities  spontaneously, antigravity  -Sensation: Intact to light touch.    Today I independently reviewed pertinent medications, lines/drains/airways, imaging, cardiology results, laboratory results, notably:     CT Head, MRI Brain  CBC, CMP     Assessment/Plan:     Neuro  Intractable epilepsy  Continue home AEDs:  Clobazam 40mg BID  Lamotrigine 450mg Daily    Psychiatric  Anxiety and depression  Continue sertraline    Derm  * Cavernoma  R temporal cavernoma  S/p Crani for resection 11/9    -Admit to [facility]  -NSGY following  -Neuro checks, Vital signs q1hr  -SBP Goal < 130 for 24 hrs, then SBP Goal < 160  -Subgaleal drain in place  -HOB 30 deg  -ADAT, IVF until tolerating diet  -Mechanical SCDs  -PT/OT/SLP  -Seizure precautions    Other  Tobacco use  1 PPD smoker per father  Nicotine patch as needed    The patient is being Prophylaxed for:  Venous Thromboembolism with: Mechanical or Chemical  Stress Ulcer with: Not Applicable   Ventilator Pneumonia with: not applicable    Activity Orders          Diet Adult Regular (IDDSI Level 7): Regular starting at 11/09 1145        FULL    Marisela Shore NP  Neurocritical Care  [facility]

## 2020-11-09 NOTE — OP NOTE
[facility]  Neurosurgery  Operative Note    SUMMARY      Date of Procedure: 11/9/2020     Procedure: Procedure(s) (LRB):  CRANIOTOMY, USING FRAMELESS STEREOTAXY FOR  RIGHT SIDED RESECTION OF CAVERNOMA (Right)     Surgeon(s) and Role:     * Nasra Wetzel MD - Primary     * Lee Wilkes MD - Resident - Assisting     * Richard Cortes MD - Co-Surgeon    Pre-Operative Diagnosis: Intractable seizures [G40.919]    Post-Operative Diagnosis: Post-Op Diagnosis Codes:     * Intractable seizures [G40.919]    Anesthesia: General    Technical Procedures Used:   1. Right temporal craniotomy for resection of cavernoma   2. Use of neuronavigation     Indications:   Mary Haywood is a 35 y.o. female with drug resistant epilepsy who presents with cavernoma of the right temporal lobe.  Recommendation of the interdisciplinary epilepsy surgery conference is for resection of the cavernoma.     We had a lengthy discussion about the risks, benefits, and alternatives to surgery. Risks include, but are not limited to, bleeding, pain, infection, scarring, need for further/repeat procedure, death, paralysis, damage to vision/cranial nerves/memory/speech, stroke/damage to major blood vessels, leak of cerebrospinal fluid, and hardware-related complications. They understand that we cannot guarantee seizure freedom and that she will need to continue her pharmacotherapy. Informed consent was obtained of the patient with her father present.     Description of the Procedure:   The patient was brought back to the operating room, and general endotracheal anesthesia was induced. An arterial line was placed, and instructions were given to keep SBP <140 throughout the case. She was carefully positioned supine with a bump under her right shoulder to allow the right mastoid tip to be the highest point in the field. Her head was affixed to the table with a Ramos 3-point head clamp. She was registered to the neuronavigation  system with appropriate accuracy.      A modified reverse-question-dorothy incision was designed from the root of the zygoma to the keyhole, posterior to the hairline. A time out was performed. The incision was injected with 1% lidocaine with epinephrine. The patient received 2 g ceftriaxone, 8 of dexamethasone, and a gram of Keppra. Incision was made with a 15 blade, and dissection was carried down with Bovie cautery. Bipolar at 20 was used for hemostasis where indicated. The STA was preserved. Care was taken to preserve a cuff of temporalis fascia for re-approximation of the temporalis. A myocutaneous flap was elevated. The root of the zygoma was again identified.      We designed bur hole just above the root of the zygoma. Craniotomy was completed with the craniotome and the M8 where needed over the sphenoid ridge. The bone flap was elevated with the Penfield #3. The sphenoid ridge was drilled down with the M8 and liberally waxed. We also performed a subtemporal decompression. Tack-up stitches were placed.      Once the bony work was completed, we opened the dura in a c-shape, flapping it down over the temporalis.     Using the neuronavigation, we confirmed our trajectory into the middle temporal gyrus.  After corticectomy was performed, we were able to visualize the cavernoma.  This was worked out EN bloc, taking care to resect the hemosiderin around the lesion.     Once we were satisfied that we had removed the entirety of the hemosiderin ring, we worked to obtain meticulous hemostasis.  We did not leave Surgicel in place in order to minimize tissue irritation.     We performed a closure of the dura with 4.0 Nurolon stitches. We placed Floseal and dry gel foam over the craniotomy defect. The bone was reaffixed with titanium plates and screws.      We reapproximated the temporalis and its fascia with 2.0 Vicryl stitches.  A subgaleal Hemovac drain was left in place.  The galea was closed with 2 0 and 3.0 inverted  Vicryl stitches. The skin was closed with running 4.0 Vicryl Rapide.      The patient was carefully removed from pins, and a sterile dressing of bacitracin ointment, Telfa, and Medipore tape was applied.      The patient was then awakened by the anesthesia service and taken to ICU-level recovery in satisfactory condition.      Two staff neurosurgeons participated in order to minimize operative time.     Complications: No    Estimated Blood Loss (EBL): 50 cc           Specimens:   Specimen (12h ago, onward)    None           Implants:   Implant Name Type Inv. Item Serial No.  Lot No. LRB No. Used Action   SCREW UN3 AXS SD 1.5X3MM - [id number]  SCREW UN3 AXS SD 1.5X3MM  [employer] BRIAN.  Right 2 Implanted   SCREW UN3 AXS SD 1.5X4MM - [id number]  SCREW UN3 AXS SD 1.5X4MM  [employer] BRIAN.  Right 10 Implanted   PLATE BONE BUR HOLE COVER 10MM - [id number]  PLATE BONE BUR HOLE COVER 10MM  [employer] BRIAN.  Right 1 Implanted   PLATE BONE 2X2 HOLE SM BOX - [id number]  PLATE BONE 2X2 HOLE SM BOX  [employer] BRIAN.  Right 2 Implanted              Condition: Stable    Disposition: ICU - extubated and stable.    Attestation: I was present and scrubbed for the entire procedure.

## 2020-11-09 NOTE — TRANSFER OF CARE
Anesthesia Transfer of Care Note    Patient: Mary Haywood    Procedure(s) Performed: Procedure(s) (LRB):  CRANIOTOMY, USING FRAMELESS STEREOTAXY FOR  RIGHT SIDED RESECTION OF CAVERNOMA (Right)    Patient location: ICU    Anesthesia Type: general    Transport from OR: Transported from OR on 6-10 L/min O2 by face mask with adequate spontaneous ventilation    Post pain: adequate analgesia    Post assessment: no apparent anesthetic complications    Post vital signs: stable    Level of consciousness: awake and alert    Nausea/Vomiting: no nausea/vomiting    Complications: none    Transfer of care protocol was followed      Last vitals:   Visit Vitals  BP (!) 100/53 (BP Location: Left arm, Patient Position: Lying)   Pulse 64   Temp 36.4 °C (97.6 °F) (Oral)   Resp 16   Wt 63.5 kg (140 lb)   LMP 01/01/2012   SpO2 98%   Breastfeeding No   BMI 23.30 kg/m²

## 2020-11-09 NOTE — PROGRESS NOTES
Outpatient Neuropsychological Evaluation    Referral Information  Name: Mary Haywood  MRN: [id number]  : 1985  Age: 35 y.o.  Handedness: Right  Race: White  Gender: female  Referring Provider: Charmaine Hennessy Md Phd  [id number]  [address]  [place] [address]  Referral Reason/Medical Necessity: Cognitive difficulties with neuropsychological evaluation ordered by Neurology to characterize cognitive status, differential diagnosis, and updated treatment recommendations. This evaluation is also to assess cognitive functioning prior to neurosurgery on 20.  Visit Type: Follow-up testing to finish evaluation, complete report, treatment plan. She had an initial visit on 10/14/2020 with a plan for full testing. She could not complete and was rescheduled for 10/28/20. She rescheduled that appt for 20.   Billing: See end of report   Consent: The patient expressed an understanding of the purpose of the evaluation and consented to all procedures.    DIAGNOSTIC IMPRESSIONS/TREATMENT PLAN:   Review below for onset/course of cognitive symptoms along with functional status and pertinent medical history. Results show the following diagnoses and treatment plan.    Problem List Items Addressed This Visit        Neuro    Complex partial epilepsy with generalization and with intractable epilepsy    Overview                Mild 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 - Primary    Current Assessment & Plan     Assessment:  Cognitive Assessment (Localization, etc.):   · Testing showed predominant trouble with processing speed, attention/focus, and nonverbal/visual-spatial skills.   · Traditionally left hemisphere functions for language were broadly normal but traditionally right hemisphere functions were broadly lower and consistent with her imaging findings.   · Recall or memory functions were mostly affected by slow mental speed/trouble with attention/focus. So, she can retain  information when adequate focused or when information is repeated. Thus, memory structures are likely intact.   Psychiatric Assessment:   · Longstanding depression/anxiety that is better on SSRI but still has periods of significant symptoms week to week.   Pre/Post-Surgical Considerations:   · Since operative plan is for right hemisphere in a right handed patient, less risk for post-surgical decline for language/verbal memory.   · Given her significant psychiatric history, certainly she will need more regular mental health follow-up post-surgically to ensure she lencho well.   · Does have stressful psychosocial environment that can certainly impact adherence to recs after surgery. So, recommend family minimize stressors at home.   · Instructions and explanations should be given to patient and another person rather than relying on the patient to retain information on her own.   · No concerns about capacity.   Plan:  >>Feedback: Will have feedback session to review results, provide education/support, and discuss below plan of care.   >>Cognitive Strategies: Will discuss strategies and provide handouts on attention, processing speed, and memory during feedback.             Psychiatric    Anxiety and depression    Current Assessment & Plan     -will discuss treatment plan during neuropsych feedback, but regular psychotherapy is important along with her existing SSRI         PTSD (post-traumatic stress disorder)    Current Assessment & Plan     -will discuss tx plan with her during feedback.                 ELA Cates II, Ph.D., ABPP-CN  Board Certified Clinical Neuropsychologist  Co-Director, Cognitive Disorders and Brain Health Program  Department of Neurology and Neurosciences  [facility]    HPI/CURRENT SYMPTOMS   Active problems noted below.  [Onset/Course of Cognitive Symptoms]:  Ms. Haywood is being assessed as a part of her pre-surgical workup for potential epilepsy surgery. In 2012, seizures started  "and cognitive sxs started at same time. Short-term memory is her primary issue (forgetting conversations, activities she's previously participated in) and has worsened over the past 8-years.     Cognitive Sxs:  · Attention: Longstanding attention trouble since childhood and is worse now in the context worsening seizures and various AEDs  · Mental Speed: None  · Memory: Primary symptom is short-term memory  · She notices that some "information will stick in her memory" but "has no idea why it sticks when other information doesn't." For compensatory strategies, she tries to use lists/notebooks. Memory strategies are helpful but she often misplaces those external cues.   · Language: No identified trouble  · Visuospatial/Perceptual: No identified trouble  · Executive Functioning: No identified trouble    Neuropsychiatric Sxs:  Mood:   Depression Anxiety Other   · "80% of the time, I'm okay" and "20% of the time I cocoon myself and don't want to be messed with for no reason"  · Zoloft is helpful (taking above depressions down from 50% to 20%) · Some mild normative sxs related to her children · NA     Neurovegetative:   Sleep Appetite Energy    "Perfectly fine"  "Zero" for the past year but "I know I have to eat"   Variable     Behavioral Concerns: Does have behavioral issues when she has seizures post-ictally but nothing otherwise  Delusions/Hallucinations: None  SI/HI: None    Physical Sxs:  Motor Sensory Pain    NA  NA  NA       Current Functioning (I/ADLs):  ADLs  Self-Care Eating Safety Other   Independent Independent Independent NA     Instrumental IADLs:   Driving Medications/Health Household Finances   -Does not drive -Independent -Independent -On disability and father provides support       PERTINENT BACKGROUND INFORMATION     Family History   Adopted: Yes   Family history unknown: Yes     Family Neurologic History: Negative for heritable risk factors  Family Psychiatric History: Negative for heritable risk " "factors    Developmental/Academic Hx:  Developmental: ?Maternal drug abuse No gestational or later developmental concerns.  Academic:  · Learning Difficulties: None  · Attention/Behavioral Difficulties:   · Diagnosed with ADHD and placed on Ritalin and Wellbutrin. She had a seizure at 10yo and stopped Wellbutrin. She is uncertain if Ritalin helped. With high motivation and pressure, her attention/focus improved.   · Diagnosed with ODD and had various episodes of treatment.   · Educational Attainment:  · 2002: Senior Year was pulled out from boarding school due to financial issues. She decided to get her GED instead of finish the public school.   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      MEDICAL HISTORY     Patient Active Problem List   Diagnosis    Temporal lobe epilepsy    Complex partial epilepsy with generalization and with intractable epilepsy    Anxiety and depression    PTSD (post-traumatic stress disorder)    HSV-1 infection    HSV-2 infection    H/O vaginal hysterectomy    Hormonal disorder    Partial symptomatic epilepsy with complex partial seizures, intractable, with status epilepticus    Mild 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    Intractable epilepsy    Postural dizziness    Currently smokes tobacco    Constipation    Hyperglycemia    Hyponatremia    Palpable abdominal aorta    Macrocytosis without anemia    Cavernoma    Tobacco use       Past Medical History: "   Diagnosis Date    ADHD (attention deficit hyperactivity disorder)     Anxiety and depression 7/5/2019    Complex partial epilepsy with generalization and with intractable epilepsy 2012         HSV-1 infection     HSV-2 infection     Neuromuscular disorder     Overdose of illicit drug     PTSD (post-traumatic stress disorder)        Past Surgical History:   Procedure Laterality Date    CRANIOTOMY USING FRAMELESS STEREOTAXY Right 11/9/2020    Procedure: CRANIOTOMY, USING FRAMELESS STEREOTAXY FOR  RIGHT SIDED RESECTION OF CAVERNOMA;  Surgeon: Nasra Wetzel MD;  Location: [facility] OR [address] [place] [address];  Service: Neurosurgery;  Laterality: Right;  TORONTO II, ASA II, BLOOD TYPE & CROSS 2 UNITS, HEADREST GRAYSON, REGULAR BED, SUPINE    HYSTERECTOMY  2011    endometriosis    OOPHORECTOMY  2011    ovarian cyst    wisdom teeth removal         Pertinent Neurologic History  TBIs  None   Seizures  Temporal Lobe Epilepsy (review current Epilepsy notes)   Pre-surgical Knowledge: Good description of Dr. Hennessy's recommendation and plan. Unsure about success rate. Knew she'd have to take meds forever.     CVAs  None   Movement Dis.  None   Other  None         Pertinent Labs Impacting Cognition  No results found for: [id number]  No results found for: RPR  No results found for: FOLATE  No results found for: TSH, O4TTBIE, F0YOGRU, THYROIDAB  Lab Results   Component Value Date    HGBA1C 4.8 10/23/2020     No results found for: HIV1X2, OBK78NNBE    Neurodiagnostics  Year Diagnostic Results[Copied from Report]   2020   MRI FINDINGS:  Intracranial Compartment:     Ventricles and sulci are normal in size for age without evidence of hydrocephalus. No extra-axial blood or fluid collections.     Within the anterolateral aspect of the right temporal lobe there is a 1 cm complex signal lesion that demonstrates areas of both T1 and T2 signal hyperintensity centrally and hypointense signal peripherally and is associated with blooming  artifact on the gradient echo sequence.  After contrast administration, there is some enhancement which communicates with the leptomeninges along the periphery of the lesion.  Findings are suggestive of a benign cavernous malformation.  The brain parenchyma and CSF spaces are otherwise unremarkable.     Hippocampi have normal and symmetric architecture and signal.     Normal vascular flow voids are preserved.     Skull/Extracranial Contents (limited evaluation): Bone marrow signal intensity is normal.     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  Remaining brain parenchyma demonstrates symmetric metabolic activity.     See attached [facility] report for detailed quantitative analysis, which also identifies a more focal region of decreased metabolic activity corresponding with cavernous malformation described on recent MRI brain.     Impression:     Decreased uptake in the right temporal lobe by visual inspection and more focally decreased uptake at the right temporal cavernous malformation by quantitative analysis compatible with interictal epileptogenic focus.     I, Ashish Luna MD, attest that I reviewed and interpreted the images.     Electronically signed by resident: Robert Cantrell  Date:                                            10/13/2020  Time:                                           13:25     Electronically signed by: Ashish Luna  Date:                                            10/14/2020  Time:                                           11:19   2019   Video EEG IMPRESSION:  Markedly abnormal study consistent with bitemporal, independent focal onset epilepsy.  Patient has abundant bilateral, independent interictal epileptiform discharges. A total of 13 electroclinical seizures are captured, 12 with left hemispheric onset and 1 with right hemispheric onset.      Power Collier MD     No current facility-administered medications for this visit.     Current Outpatient Medications:     HYDROcodone-acetaminophen (NORCO) 5-325 mg per tablet, Take 1 tablet by mouth every 6 (six) hours as needed., Disp: 56 tablet, Rfl: 0    senna-docusate 8.6-50 mg (PERICOLACE) 8.6-50 mg per tablet, Take 1 tablet by mouth 2 (two) times daily. for 14 days, Disp: 28 tablet, Rfl: 0    Facility-Administered Medications Ordered in Other Visits:     acetaminophen tablet 650 mg, 650 mg, Oral, Q6H PRN, Marisela Shore NP, 650 mg at 11/11/20 0849    bacitracin ointment, , Topical (Top), BID, Florinda Castillo PA-C    cloBAZam Tab 40 mg, 40 mg, Oral, BID, Lee Wilkes MD, 40 mg at 11/11/20 2125    heparin (porcine) injection 5,000 Units, 5,000 Units, Subcutaneous, Q8H, Lee Wilkes MD, 5,000 Units at 11/12/20 0524    HYDROcodone-acetaminophen 5-325 mg per tablet 1 tablet, 1 tablet, Oral, Q4H PRN, Lee Wilkes MD, 1 tablet at 11/11/20 2141    HYDROmorphone injection 0.2 mg, 0.2 mg, Intravenous, Q4H PRN, Lee Wilkes MD, 0.2 mg at 11/10/20 1822    labetalol 20 mg/4 mL (5 mg/mL) IV syring, 10 mg, Intravenous, Q4H PRN, Arti Barbosa PA-C    lamoTRIgine tablet 150 mg, 150 mg, Oral, QHS, Marisela Shore NP, 150 mg at 11/11/20 2126    lamoTRIgine tablet 300 mg, 300 mg, Oral, Daily, Marisela Shore NP, 300 mg at 11/11/20 0850    mupirocin 2 % ointment, , Nasal, BID, Lee Wilkes MD    nicotine 14 mg/24 hr 1 patch, 1 patch, Transdermal, Daily, Marisela Shore NP, 1 patch at 11/12/20 0808    ondansetron injection 4 mg, 4 mg, Intravenous, Q6H PRN, Nasra Wetzel MD, 4 mg at 11/11/20 1430    potassium chloride packet 40 mEq, 40 mEq, Oral, Once, [facility] [place]     "senna-docusate 8.6-50 mg per tablet 1 tablet, 1 tablet, Oral, BID, Adriel Acosta MD    sertraline tablet 100 mg, 100 mg, Oral, QHS, Lee Wilkes MD, 100 mg at 11/11/20 2127    Relevant Psychiatric History:  · Childhood: Review 10/24/19 psychiatry notes for extensive history  · Adult:  · Current: No treatment  · Previous: Aside from 2019 evaluation at [facility] for depression, no treatment.   · Substance Use:   · Cigarettes: 1ppd daily for 20-years  · Marijuana: Nightly (3-hits) for appetite  · Alcohol: Rarely    Social History     Tobacco Use    Smoking status: Current Every Day Smoker     Packs/day: 1.00     Types: Cigarettes     Start date: 7/29/1998    Smokeless tobacco: Never Used   Substance and Sexual Activity    Alcohol use: Yes     Frequency: Monthly or less     Drinks per session: 3 or 4     Comment: once a month    Drug use: Yes     Types: Marijuana    Sexual activity: Not Currently     Partners: Male     Birth control/protection: See Surgical Hx, Surgical     Comment:        MENTAL STATUS AND OBSERVATIONS:     APPEARANCE: Casually dressed and adequate grooming/hygiene.   ALERTNESS/ORIENTATION: Attentive and alert. Fully oriented (x5) to time and place  GAIT: Unremarkable  MOTOR MOVEMENTS/MANNERISMS: Unremarkable  SPEECH/LANGUAGE: Normal in rhythm, tone, and volume. Slower rate of speech. Mild significant word finding difficulty noted. Expressive and receptive language was normal.  STATED MOOD/AFFECT: The patients stated mood was "okay" Affect was congruent with stated mood.   INTERPERSONAL BEHAVIOR: Rapport was quickly and easily established   SUICIDALITY/HOMICIDALITY: Denied  HALLUCINATIONS/DELUSIONS: None evidenced or endorsed  THOUGHT PROCESSES: Thoughts seemed logical and goal-directed.    TEST TAKING BEHAVIOR and VALIDITY: Freestanding and embedded performance validity measures and observation of effort were suggestive of adequate engagement. Results are valid for " interpretation.    PROCEDURES/TESTS ADMINISTERED   In addition to performing a review of pertinent medical records, reviewing limits to confidentiality, conducting a clinical interview, and explaining procedures, the following measures were administered:     Administered on 10/14/20: [employer] (ACS) Test of Pre-Morbid Functioning (TOPF), Green's MSVT, [place] Verbal Learning Test-Second Edition (CVLT-2), Wechsler Memory Scale-Fourth Edition (WMS-IV) Logical Memory and Visual Reproduction subtests, Grooved Pegboard (GPT; Kristin et al., 2004) and billed for in that previous encounter.    Administered today on 11/6/20: Wechsler Adult Intelligence Scale-Fourth Edition (WAIS-IV Core 9), Trail Making Test (TMT-A&B; Kristin et al., 2004), Verbal Fluency Test(FAS/Animals; Kristin et al., 2004), NAB Naming Test, Saul Complex Figure Copy Trial(Saul CFT), [place] Card Sorting Test (WCST-128), PHQ-9, MILTON-7 and billed for in this encounter.    BILLING     Service Description CPT Code Minutes Units   Psychiatric diagnostic evaluation by physician [id number]     Neurobehavioral status exam by physician [id number]  0   Each additional hour by physician [id number]  0   Test Evaluation Services --  --   Neuropsychological testing evaluation services by physician [id number] [phone]   Each additional hour by physician [id number] [phone] 1   Test Administration and Scoring --  --   Psychological or neuropsychological test administration and scoring by physician [id number]  0   Each additional 30 minutes by physician [id number]  0   Psychological or neuropsychological test administration and scoring by technician [id number] [phone] 1   Each additional 30 minutes by technician [phone]       DATA APPENDIX       Raw Score Type of Standardized Score Standardized Score   MSVT  - -   MSVT  - -   MSVT Cons 100 - -   ACS LM II Rec 26 - -   ACS VR II Rec 6 - -   ACS RDS 9 - -   CVLT-II FC 16 - -   PREMORBID FUNCTIONING Raw Score Type of Standardized Score  Standardized Score   TOPF simple dem. eFSIQ - SS 91   TOPF pred. eFSIQ -    TOPF simple + pred. eFSIQ - SS 96   INTELLECTUAL FUNCTIONING Raw Score Type of Standardized Score Standardized Score   WAIS-IV      VCI - SS 98   BANDAR - SS 86   WMI - SS 83   PSI - SS 65   FSIQ - SS 81   GAI - ss 92   Similarities 24 ss 9   Vocabulary 38 ss 10   Block Design 24 ss 6   Matrix Reasoning 17 ss 9   Visual Puzzles 12 ss 8   Digit Span 24 ss 8         DS Forward 8 ss 7         DS Backward 8 ss 9         DS Sequence 8 ss 9         Longest Digit Forward 5 - -         Longest Digit Backward 4 - -         Longest Digit Sequence 6 - -   Arithmetic 9 ss 6   Symbol Search 14 ss 3   Coding 34 ss 4   LANGUAGE FUNCTIONING Raw Score Type of Standardized Score Standardized Score   WAIS-IV VCI - SS 98   WAIS-IV Similarities 24 ss 9   WAIS-IV Vocabulary 38 ss 10   TOPF Word Reading 45    NAB Naming 29 Tscore 45   FAS 22 Tscore 33   Animal Naming 14 Tscore 33   VISUOSPATIAL FUNCTIONING Raw Score Type of Standardized Score Standardized Score   WAIS-IV BANDAR - SS 86   WAIS-IV Block Design 24 ss 6   WAIS-IV Matrix Reasoning 17 ss 9   WAIS-IV Visual Puzzles 12 ss 8   RCFT Copy 32 - -   RCFT Time to Copy 315 - -   LEARNING & MEMORY Raw Score Type of Standardized Score Standardized Score   CVLT-II      Trials 1-5 (T-Score) 48 Tscore 43   List A Trial 1 6 zscore -1   List A Trial 5 13 zscore 0   List B 1 zscore -2.5   SDFR 13 zscore 0.5   SDCR 12 zscore -0.5   LDFR 11 zscore -1   LDCR 11 zscore -1   Semantic Clustering 2.5 zscore 1   Learning [place]ford 1.8 zscore 0.5   Repetitions 1 zscore -1   Intrusions 7 zscore 1   Recognition Hits 15 zscore -0.5   False Positives 0 zscore -0.5   Discriminability 3.7 zscore 0.5   WMS-IV Subtests      LM I 15 ss 5   LM II 10 ss 5   LM Recognition 26 - -   (CVLT-II Trials 1-5) 43  8   (CVLT-II Long Delay) -1  8   VR I 27 ss 5   VR II 18 ss 7   VR II Recognition 6 - -   VR Copy 42 - -   ATTENTION/WORKING MEMORY  Raw Score Type of Standardized Score Standardized Score   WAIS-IV WMI - SS 83   WAIS-IV Digit Span 24 ss 8         DS Forward 8 ss 7         DS Backward 8 ss 9         DS Sequence 8 ss 9         Longest Digit Forward 5 - -         Longest Digit Backward 4 - -         Longest Digit Sequence 6 - -   WAIS-IV Arithmetic 9 ss 6   MENTAL PROCESSING SPEED Raw Score Type of Standardized Score Standardized Score   WAIS-IV PSI - SS 65   WAIS-IV Symbol Search 14 ss 3   WAIS-IV Coding 34 ss 4   TMT A  47 Tscore 33   TMT A errors 0 - -   EXECUTIVE FUNCTIONING Raw Score Type of Standardized Score Standardized Score   TMT B 101 Tscore 40   TMT B errors 0 - -   WCST-128      Total Correct 72     Total Errors 27 SS 93   Perseverative Resp. 9    Perseverative Err. 9    Nonperseverative Err. 18 SS 86   Concept. Level Response 69 SS 94   Categories Completed 6 - -   FMS 0 - -   Learning to Learn 1.45 - -   FRONTOMOTOR  Raw Score Type of Standardized Score Standardized Score   GPT DH 92 Tscore 24   GPD  Tscore 27   MOOD & PERSONALITY Raw Score Type of Standardized Score Standardized Score   PHQ-9 22 - -   MILTON-7 9 - -

## 2020-11-09 NOTE — BRIEF OP NOTE
[facility]  Brief Operative Note    SUMMARY     Surgery Date: 11/9/2020     Surgeon(s) and Role:     * Nasra Wetzel MD - Primary     * Lee Wilkes MD - Resident - Assisting     * Richard Cortes MD - Co-Surgeon        Pre-op Diagnosis:  Intractable seizures [G40.919]    Post-op Diagnosis:  Post-Op Diagnosis Codes:     * Intractable seizures [G40.919]    Procedure(s) (LRB):  CRANIOTOMY, USING FRAMELESS STEREOTAXY FOR  RIGHT SIDED RESECTION OF CAVERNOMA (Right)    Anesthesia: General    Description of Procedure: R temporal craniotomy for resection of cavernoma    Estimated Blood Loss: * No values recorded between 11/9/2020  8:26 AM and 11/9/2020 11:24 AM *    Estimated Blood Loss has been documented.         Specimens:   Specimen (12h ago, onward)    None          [id number]

## 2020-11-09 NOTE — ASSESSMENT & PLAN NOTE
S/p R temporal crani for cavernoma resection 11/9, follows with Dr. Hennessy as outpatient. Presented at multidisciplinary epilepsy surgery conference, recommendation made to proceed with cavernoma resection.    Recommendations:  - Continue outpatient AED regimen - Clobazam 40 mg daily, Lamotrigine 300 mg qAM/150 mg qPM  - Seizure precautions  - Avoid agents that lower seizure threshold  - Post-operative imaging, management per NSGY/NCC    Plan of care discussed with NCC team, will continue to follow. Please call with any questions.

## 2020-11-09 NOTE — ASSESSMENT & PLAN NOTE
R temporal cavernoma  S/p Crani for resection 11/9    -Admit to NCC  -NSGY following  -Neuro checks, Vital signs q1hr  -SBP Goal < 130 for 24 hrs, then SBP Goal < 160  -Subgaleal drain in place  -HOB 30 deg  -ADAT, IVF until tolerating diet  -Mechanical SCDs  -PT/OT/SLP  -Seizure precautions

## 2020-11-10 LAB
ALBUMIN SERPL BCP-MCNC: 3.3 G/DL (ref 3.5–5.2)
ALP SERPL-CCNC: 68 U/L (ref 55–135)
ALT SERPL W/O P-5'-P-CCNC: 6 U/L (ref 10–44)
ANION GAP SERPL CALC-SCNC: 5 MMOL/L (ref 8–16)
AST SERPL-CCNC: 10 U/L (ref 10–40)
BASOPHILS # BLD AUTO: 0.02 K/UL (ref 0–0.2)
BASOPHILS NFR BLD: 0.3 % (ref 0–1.9)
BILIRUB SERPL-MCNC: 0.3 MG/DL (ref 0.1–1)
BLD PROD TYP BPU: NORMAL
BLD PROD TYP BPU: NORMAL
BLOOD UNIT EXPIRATION DATE: NORMAL
BLOOD UNIT EXPIRATION DATE: NORMAL
BLOOD UNIT TYPE CODE: 5100
BLOOD UNIT TYPE CODE: 5100
BLOOD UNIT TYPE: NORMAL
BLOOD UNIT TYPE: NORMAL
BUN SERPL-MCNC: 3 MG/DL (ref 6–20)
CALCIUM SERPL-MCNC: 8.1 MG/DL (ref 8.7–10.5)
CHLORIDE SERPL-SCNC: 109 MMOL/L (ref 95–110)
CO2 SERPL-SCNC: 26 MMOL/L (ref 23–29)
CODING SYSTEM: NORMAL
CODING SYSTEM: NORMAL
CREAT SERPL-MCNC: 0.7 MG/DL (ref 0.5–1.4)
DIFFERENTIAL METHOD: ABNORMAL
DISPENSE STATUS: NORMAL
DISPENSE STATUS: NORMAL
EOSINOPHIL # BLD AUTO: 0.1 K/UL (ref 0–0.5)
EOSINOPHIL NFR BLD: 1 % (ref 0–8)
ERYTHROCYTE [DISTWIDTH] IN BLOOD BY AUTOMATED COUNT: 13.2 % (ref 11.5–14.5)
EST. GFR  (AFRICAN AMERICAN): >60 ML/MIN/1.73 M^2
EST. GFR  (NON AFRICAN AMERICAN): >60 ML/MIN/1.73 M^2
GLUCOSE SERPL-MCNC: 140 MG/DL (ref 70–110)
GLUCOSE SERPL-MCNC: 92 MG/DL (ref 70–110)
HCO3 UR-SCNC: 21.6 MMOL/L (ref 24–28)
HCT VFR BLD AUTO: 35.3 % (ref 37–48.5)
HCT VFR BLD CALC: 33 %PCV (ref 36–54)
HGB BLD-MCNC: 11.8 G/DL (ref 12–16)
IMM GRANULOCYTES # BLD AUTO: 0.01 K/UL (ref 0–0.04)
IMM GRANULOCYTES NFR BLD AUTO: 0.2 % (ref 0–0.5)
LYMPHOCYTES # BLD AUTO: 1.8 K/UL (ref 1–4.8)
LYMPHOCYTES NFR BLD: 28.8 % (ref 18–48)
MAGNESIUM SERPL-MCNC: 2.1 MG/DL (ref 1.6–2.6)
MCH RBC QN AUTO: 34.5 PG (ref 27–31)
MCHC RBC AUTO-ENTMCNC: 33.4 G/DL (ref 32–36)
MCV RBC AUTO: 103 FL (ref 82–98)
MONOCYTES # BLD AUTO: 0.5 K/UL (ref 0.3–1)
MONOCYTES NFR BLD: 7.7 % (ref 4–15)
NEUTROPHILS # BLD AUTO: 3.8 K/UL (ref 1.8–7.7)
NEUTROPHILS NFR BLD: 62 % (ref 38–73)
NRBC BLD-RTO: 0 /100 WBC
NUM UNITS TRANS PACKED RBC: NORMAL
NUM UNITS TRANS PACKED RBC: NORMAL
PCO2 BLDA: 37.6 MMHG (ref 35–45)
PH SMN: 7.37 [PH] (ref 7.35–7.45)
PHOSPHATE SERPL-MCNC: 2.7 MG/DL (ref 2.7–4.5)
PLATELET # BLD AUTO: 196 K/UL (ref 150–350)
PMV BLD AUTO: 10.7 FL (ref 9.2–12.9)
PO2 BLDA: 137 MMHG (ref 80–100)
POC BE: -4 MMOL/L
POC IONIZED CALCIUM: 1.06 MMOL/L (ref 1.06–1.42)
POC SATURATED O2: 99 % (ref 95–100)
POC TCO2: 23 MMOL/L (ref 23–27)
POTASSIUM BLD-SCNC: 3.5 MMOL/L (ref 3.5–5.1)
POTASSIUM SERPL-SCNC: 3.7 MMOL/L (ref 3.5–5.1)
PROT SERPL-MCNC: 5.5 G/DL (ref 6–8.4)
RBC # BLD AUTO: 3.42 M/UL (ref 4–5.4)
SAMPLE: ABNORMAL
SODIUM BLD-SCNC: 140 MMOL/L (ref 136–145)
SODIUM SERPL-SCNC: 140 MMOL/L (ref 136–145)
WBC # BLD AUTO: 6.12 K/UL (ref 3.9–12.7)

## 2020-11-10 PROCEDURE — 63600175 PHARM REV CODE 636 W HCPCS: Performed by: NEUROLOGICAL SURGERY

## 2020-11-10 PROCEDURE — 84100 ASSAY OF PHOSPHORUS: CPT

## 2020-11-10 PROCEDURE — 99233 PR SUBSEQUENT HOSPITAL CARE,LEVL III: ICD-10-PCS | Mod: ,,, | Performed by: PSYCHIATRY & NEUROLOGY

## 2020-11-10 PROCEDURE — 99233 SBSQ HOSP IP/OBS HIGH 50: CPT | Mod: ,,, | Performed by: PHYSICIAN ASSISTANT

## 2020-11-10 PROCEDURE — 63600175 PHARM REV CODE 636 W HCPCS: Performed by: STUDENT IN AN ORGANIZED HEALTH CARE EDUCATION/TRAINING PROGRAM

## 2020-11-10 PROCEDURE — 80053 COMPREHEN METABOLIC PANEL: CPT

## 2020-11-10 PROCEDURE — 99233 PR SUBSEQUENT HOSPITAL CARE,LEVL III: ICD-10-PCS | Mod: ,,, | Performed by: PHYSICIAN ASSISTANT

## 2020-11-10 PROCEDURE — 25000003 PHARM REV CODE 250: Performed by: NURSE PRACTITIONER

## 2020-11-10 PROCEDURE — 99233 SBSQ HOSP IP/OBS HIGH 50: CPT | Mod: ,,, | Performed by: PSYCHIATRY & NEUROLOGY

## 2020-11-10 PROCEDURE — S4991 NICOTINE PATCH NONLEGEND: HCPCS | Performed by: NURSE PRACTITIONER

## 2020-11-10 PROCEDURE — 25000003 PHARM REV CODE 250: Performed by: STUDENT IN AN ORGANIZED HEALTH CARE EDUCATION/TRAINING PROGRAM

## 2020-11-10 PROCEDURE — 85025 COMPLETE CBC W/AUTO DIFF WBC: CPT

## 2020-11-10 PROCEDURE — 94761 N-INVAS EAR/PLS OXIMETRY MLT: CPT

## 2020-11-10 PROCEDURE — 20000000 HC ICU ROOM

## 2020-11-10 PROCEDURE — 83735 ASSAY OF MAGNESIUM: CPT

## 2020-11-10 RX ORDER — SODIUM CHLORIDE 9 MG/ML
INJECTION, SOLUTION INTRAVENOUS CONTINUOUS
Status: DISCONTINUED | OUTPATIENT
Start: 2020-11-10 | End: 2020-11-11

## 2020-11-10 RX ORDER — LABETALOL HCL 20 MG/4 ML
10 SYRINGE (ML) INTRAVENOUS EVERY 4 HOURS PRN
Status: DISCONTINUED | OUTPATIENT
Start: 2020-11-10 | End: 2020-11-12 | Stop reason: HOSPADM

## 2020-11-10 RX ADMIN — CLOBAZAM 40 MG: 10 TABLET ORAL at 08:11

## 2020-11-10 RX ADMIN — HYDROCODONE BITARTRATE AND ACETAMINOPHEN 1 TABLET: 5; 325 TABLET ORAL at 10:11

## 2020-11-10 RX ADMIN — ONDANSETRON 4 MG: 2 INJECTION INTRAMUSCULAR; INTRAVENOUS at 05:11

## 2020-11-10 RX ADMIN — CLOBAZAM 40 MG: 10 TABLET ORAL at 09:11

## 2020-11-10 RX ADMIN — MUPIROCIN: 20 OINTMENT TOPICAL at 08:11

## 2020-11-10 RX ADMIN — CEFAZOLIN 2 G: 1 INJECTION, POWDER, FOR SOLUTION INTRAMUSCULAR; INTRAVENOUS at 09:11

## 2020-11-10 RX ADMIN — NICOTINE 1 PATCH: 14 PATCH TRANSDERMAL at 08:11

## 2020-11-10 RX ADMIN — CEFAZOLIN 2 G: 1 INJECTION, POWDER, FOR SOLUTION INTRAMUSCULAR; INTRAVENOUS at 12:11

## 2020-11-10 RX ADMIN — SODIUM CHLORIDE: 0.9 INJECTION, SOLUTION INTRAVENOUS at 10:11

## 2020-11-10 RX ADMIN — LAMOTRIGINE 150 MG: 150 TABLET ORAL at 09:11

## 2020-11-10 RX ADMIN — ONDANSETRON 4 MG: 2 INJECTION INTRAMUSCULAR; INTRAVENOUS at 10:11

## 2020-11-10 RX ADMIN — HEPARIN SODIUM 5000 UNITS: 5000 INJECTION INTRAVENOUS; SUBCUTANEOUS at 09:11

## 2020-11-10 RX ADMIN — HEPARIN SODIUM 5000 UNITS: 5000 INJECTION INTRAVENOUS; SUBCUTANEOUS at 05:11

## 2020-11-10 RX ADMIN — HYDROCODONE BITARTRATE AND ACETAMINOPHEN 1 TABLET: 5; 325 TABLET ORAL at 02:11

## 2020-11-10 RX ADMIN — HYDROMORPHONE HYDROCHLORIDE 0.2 MG: 1 INJECTION, SOLUTION INTRAMUSCULAR; INTRAVENOUS; SUBCUTANEOUS at 05:11

## 2020-11-10 RX ADMIN — SODIUM CHLORIDE: 0.9 INJECTION, SOLUTION INTRAVENOUS at 08:11

## 2020-11-10 RX ADMIN — SERTRALINE HYDROCHLORIDE 100 MG: 100 TABLET ORAL at 09:11

## 2020-11-10 RX ADMIN — HYDROMORPHONE HYDROCHLORIDE 0.2 MG: 1 INJECTION, SOLUTION INTRAMUSCULAR; INTRAVENOUS; SUBCUTANEOUS at 06:11

## 2020-11-10 RX ADMIN — HEPARIN SODIUM 5000 UNITS: 5000 INJECTION INTRAVENOUS; SUBCUTANEOUS at 02:11

## 2020-11-10 RX ADMIN — HYDROCODONE BITARTRATE AND ACETAMINOPHEN 1 TABLET: 5; 325 TABLET ORAL at 08:11

## 2020-11-10 RX ADMIN — LAMOTRIGINE 300 MG: 100 TABLET ORAL at 08:11

## 2020-11-10 RX ADMIN — CEFAZOLIN 2 G: 1 INJECTION, POWDER, FOR SOLUTION INTRAMUSCULAR; INTRAVENOUS at 05:11

## 2020-11-10 RX ADMIN — MUPIROCIN: 20 OINTMENT TOPICAL at 09:11

## 2020-11-10 NOTE — HOSPITAL COURSE
11/9: s/p R temporal craniotomy for resection of cavernoma, home AEDs continued  11/10: No acute events overnight, doing well post-operatively. Continue home AEDs.  11/11: No events overnight, no reported seizures. Continuing home AEDs. Stepped down to NSGY service.

## 2020-11-10 NOTE — SUBJECTIVE & OBJECTIVE
Medications:  Continuous Infusions:  Scheduled Meds:   ceFAZolin (ANCEF) IVPB  2 g Intravenous Q8H    cloBAZam  40 mg Oral BID    heparin (porcine)  5,000 Units Subcutaneous Q8H    lamoTRIgine  150 mg Oral QHS    lamoTRIgine  300 mg Oral Daily    mupirocin   Nasal BID    nicotine  1 patch Transdermal Daily    sertraline  100 mg Oral QHS     PRN Meds:acetaminophen, HYDROcodone-acetaminophen, HYDROmorphone, labetalol, ondansetron     Review of Systems  Objective:     Weight: 61.5 kg (135 lb 9.3 oz)  Body mass index is 22.56 kg/m².  Vital Signs (Most Recent):  Temp: 98.4 °F (36.9 °C) (11/10/20 0305)  Pulse: 66 (11/10/20 0605)  Resp: (!) 21 (11/10/20 0605)  BP: (!) 110/58 (11/10/20 0605)  SpO2: 97 % (11/10/20 0605) Vital Signs (24h Range):  Temp:  [97.7 °F (36.5 °C)-98.7 °F (37.1 °C)] 98.4 °F (36.9 °C)  Pulse:  [60-85] 66  Resp:  [11-37] 21  SpO2:  [91 %-100 %] 97 %  BP: ()/(48-61) 110/58  Arterial Line BP: ()/(53-65) 101/57                          Closed/Suction Drain 11/09/20 1104 Right Other (Comment) Accordion 10 Fr. (Active)   Site Description Unable to view 11/10/20 0305   Dressing Type Gauze 11/10/20 0305   Dressing Status Clean;Dry;Intact 11/10/20 0305   Dressing Intervention Integrity maintained 11/10/20 0305   Drainage Bloody 11/10/20 0305   Status Other (Comment) 11/10/20 0305   Output (mL) 15 mL 11/10/20 0505       Neurosurgery Physical Exam    GCS [id number]    Pupils: Equal and Reactive.   Extraocular Movements: Conjugate, normal movement.      Motor Exam:  Left Upper Extremity:Follows Commands.  Right Upper Extremity: Follows Commands.  Left Lower Extremity: Follows Commands.  Right Lower Extremity: Follows Commands.            Significant Labs:  Recent Labs   Lab 11/09/20  1154 11/10/20  0116   * 92    140   K 4.2 3.7   * 109   CO2 24 26   BUN 4* 3*   CREATININE 0.7 0.7   CALCIUM 7.7* 8.1*   MG  --  2.1     Recent Labs   Lab 11/09/20  1154 11/10/20  0116   WBC 4.34  6.12   HGB 12.9 11.8*   HCT 37.5 35.3*    196     Recent Labs   Lab 11/09/20  0542   INR 0.9   APTT 33.0*     Microbiology Results (last 7 days)     ** No results found for the last 168 hours. **        All pertinent labs from the last 24 hours have been reviewed.    Significant Diagnostics:  I have reviewed all pertinent imaging results/findings within the past 24 hours.

## 2020-11-10 NOTE — PLAN OF CARE
[facility] Care Plan    POC reviewed with Mary Haywood at 0300. Pt verbalized understanding. Questions and concerns addressed. No acute events overnight. Pt progressing toward goals. Will continue to monitor. See below and flowsheets for full assessment and VS info.       Neuro:  [place] Coma Scale  Best Eye Response: 3-->(E3) to speech  Best Motor Response: 6-->(M6) obeys commands  Best Verbal Response: 5-->(V5) oriented  Mike Coma Scale Score: 14  Assessment Qualifiers: patient not sedated/intubated        24hr Temp:  [97.6 °F (36.4 °C)-98.7 °F (37.1 °C)]     CV:   Rhythm: normal sinus rhythm  BP goals:   SBP <  130  MAP > 65    Resp:   O2 Device (Oxygen Therapy): room air       Plan: N/A    GI/:  HUNTER Total Score: 20  Diet/Nutrition Received: regular  Last Bowel Movement: 11/08/20  Voiding Characteristics: voids spontaneously without difficulty    Intake/Output Summary (Last 24 hours) at 11/10/2020 0443  Last data filed at 11/10/2020 0305  Gross per 24 hour   Intake 3050 ml   Output 3420 ml   Net -370 ml     Unmeasured Output  Stool Occurrence: 0    Labs/Accuchecks:  Recent Labs   Lab 11/10/20  0116   WBC 6.12   RBC 3.42*   HGB 11.8*   HCT 35.3*         Recent Labs   Lab 11/10/20  0116      K 3.7   CO2 26      BUN 3*   CREATININE 0.7   ALKPHOS 68   ALT 6*   AST 10   BILITOT 0.3      Recent Labs   Lab 11/09/20  0542   INR 0.9   APTT 33.0*    No results for input(s): CPK, CPKMB, TROPONINI, MB in the last 168 hours.    Electrolytes: No replacement orders  Accuchecks: none    Gtts:       LDA/Wounds:  Lines/Drains/Airways       Drain                   Closed/Suction Drain 11/09/20 1104 Right Other (Comment) Accordion 10 Fr. less than 1 day              Arterial Line              Arterial Line 11/09/20 0720 Right Radial less than 1 day              Peripheral Intravenous Line                   Peripheral IV - Single Lumen 11/09/20 0542 18 G Anterior;Distal;Left Wrist less than 1 day          Peripheral IV - Single Lumen 11/09/20 1100 16 G Left;Posterior Hand less than 1 day                  Wounds: No  Wound care consulted: No

## 2020-11-10 NOTE — PLAN OF CARE
NAEO  Post op day 1  Drain in place   Continue AED regimen  PT/OT  Mobilize oob to chair as tolerated  SBP < 130 mmhg  D/c arterial line

## 2020-11-10 NOTE — PLAN OF CARE
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  Patient is able to answer questions.  Per patient, she lives with her 17 year old son in a single story house with no step(s) to enter.   Per patient, she was independent with ADLS and used no dme for ambulation.  Patient will have assistance from her son and father upon discharge.   Discharge Planning Booklet given to patient and discussed.  All questions addressed.  CM will follow for needs.         11/10/20 1530   Discharge Assessment   Assessment Type Discharge Planning Assessment   Confirmed/corrected address and phone number on facesheet? Yes   Assessment information obtained from? Patient   Expected Length of Stay (days) 3   Communicated expected length of stay with patient/caregiver yes   Prior to hospitilization cognitive status: Alert/Oriented   Prior to hospitalization functional status: Independent   Current cognitive status: Alert/Oriented   Current Functional Status: Needs Assistance   Lives With child(josue), adult  (17 year old son)   Able to Return to Prior Arrangements yes   Is patient able to care for self after discharge? Unable to determine at this time (comments)   Who are your caregiver(s) and their phone number(s)? Aris Haywood (father) [phone]   Patient's perception of discharge disposition home or selfcare   Readmission Within the Last 30 Days no previous admission in last 30 days   Patient currently receives any other  outside agency services? No   Equipment Currently Used at Home none   Do you have any problems affording any of your prescribed medications? No   Is the patient taking medications as prescribed? yes   Does the patient have transportation home? Yes   Transportation Anticipated family or friend will provide   Does the patient receive services at the [facility]? No   Discharge Plan A Home   Discharge Plan B Home Health   DME Needed Upon Discharge  other (see comments)  (tbd)   Patient/Family in Agreement with Plan yes                  PCP:  Primary Doctor No  None        Pharmacy:    [id number] - [place], [place] - [address]  [address] [address]  Phone: [phone] Fax: [phone]        Emergency Contacts:  Extended Emergency Contact Information  Primary Emergency Contact: ariel collins  Address: [address] [place] [address]  Mobile Phone: [phone]  Relation: Father  Preferred language: English   needed? No  Secondary Emergency Contact: Dayanara Collins  Address: [address] [place] [address]  Mobile Phone: [phone]  Relation: Mother      Insurance:    Payor: [employer] MANAGED MEDICARE / Plan: [employer] MEDICARE PPO / Product Type: Medicare Advantage /     Amanda Olivarez RN, CCRN-K, [facility]  Neuro-Critical Care   [phone]    11/10/2020  3:44 PM

## 2020-11-10 NOTE — PLAN OF CARE
[facility] Care Plan    POC reviewed with Mary Haywood and family at 1400. Pt verbalized understanding. Questions and concerns addressed. No acute events today. Pt progressing toward goals. Will continue to monitor. See below and flowsheets for full assessment and VS info.       Neuro:  [place] Coma Scale  Best Eye Response: 3-->(E3) to speech  Best Motor Response: 6-->(M6) obeys commands  Best Verbal Response: 5-->(V5) oriented  Mike Coma Scale Score: 14  Assessment Qualifiers: no eye obstruction present, patient not sedated/intubated        24 hr Temp:  [97.6 °F (36.4 °C)-98.7 °F (37.1 °C)]     CV:   Rhythm: normal sinus rhythm  BP goals:   SBP <  130  MAP > 65    Resp:   O2 Device (Oxygen Therapy): room air       Plan:  Neuro checks Q1 ,drain care, CT tomorrow     GI/:                Intake/Output Summary (Last 24 hours) at 11/9/2020 1807  Last data filed at 11/9/2020 1800  Gross per 24 hour   Intake 2500 ml   Output 3265 ml   Net -765 ml          Labs/Accuchecks:  Recent Labs   Lab 11/09/20  1154   WBC 4.34   RBC 3.72*   HGB 12.9   HCT 37.5         Recent Labs   Lab 11/09/20  1154      K 4.2   CO2 24   *   BUN 4*   CREATININE 0.7      Recent Labs   Lab 11/09/20  0542   INR 0.9   APTT 33.0*    No results for input(s): CPK, CPKMB, TROPONINI, MB in the last 168 hours.    Electrolytes: N/A - electrolytes WDL  Accuchecks: Q6H    Gtts:   sodium chloride 0.9%         LDA/Wounds:  Lines/Drains/Airways       Drain                   Closed/Suction Drain 11/09/20 1104 Right Other (Comment) Accordion 10 Fr. less than 1 day              Arterial Line              Arterial Line 11/09/20 0720 Right Radial less than 1 day              Peripheral Intravenous Line                   Peripheral IV - Single Lumen 11/09/20 0542 18 G Anterior;Distal;Left Wrist less than 1 day                  Wounds: No  Wound care consulted: No

## 2020-11-10 NOTE — ASSESSMENT & PLAN NOTE
S/p R temporal crani for cavernoma resection 11/9, follows with Dr. Hennessy as outpatient. Presented at multidisciplinary epilepsy surgery conference, recommendation made to proceed with cavernoma resection.    Recommendations:  - Continue outpatient AED regimen - Clobazam 40 mg BID, Lamotrigine 300 mg qAM/150 mg qPM  - Seizure precautions  - Avoid agents that lower seizure threshold  - Post-operative imaging, management per NSGY/NCC    Plan of care discussed with NCC team, will continue to follow. Please call with any questions.

## 2020-11-10 NOTE — PLAN OF CARE
[facility] Care Plan    POC reviewed with Mary Haywood and family at 1400. Pt verbalized understanding. Questions and concerns addressed. No acute events today. Pt progressing toward goals. Will continue to monitor. See below and flowsheets for full assessment and VS info.     Drain output: 90ml (clear/blood tinged)      Neuro:  [place] Coma Scale  Best Eye Response: 4-->(E4) spontaneous  Best Motor Response: 6-->(M6) obeys commands  Best Verbal Response: 5-->(V5) oriented  Mike Coma Scale Score: 15  Assessment Qualifiers: patient not sedated/intubated        24 hr Temp:  [97.7 °F (36.5 °C)-98.7 °F (37.1 °C)]     CV:   Rhythm: normal sinus rhythm  BP goals:   SBP < 140  MAP > 65    Resp:   O2 Device (Oxygen Therapy): room air       Plan:  Neuro checks and drain care     GI/:  HUNTER Total Score: 20  Diet/Nutrition Received: regular  Last Bowel Movement: 11/08/20  Voiding Characteristics: voids spontaneously without difficulty    Intake/Output Summary (Last 24 hours) at 11/10/2020 1717  Last data filed at 11/10/2020 1700  Gross per 24 hour   Intake 1306.25 ml   Output 670 ml   Net 636.25 ml     Unmeasured Output  Stool Occurrence: 0    Labs/Accuchecks:  Recent Labs   Lab 11/10/20  0116   WBC 6.12   RBC 3.42*   HGB 11.8*   HCT 35.3*         Recent Labs   Lab 11/10/20  0116      K 3.7   CO2 26      BUN 3*   CREATININE 0.7   ALKPHOS 68   ALT 6*   AST 10   BILITOT 0.3      Recent Labs   Lab 11/09/20  0542   INR 0.9   APTT 33.0*    No results for input(s): CPK, CPKMB, TROPONINI, MB in the last 168 hours.    Electrolytes: N/A - electrolytes WDL  Accuchecks: Q6H    Gtts:   sodium chloride 0.9% 75 mL/hr at 11/10/20 1700       LDA/Wounds:  Lines/Drains/Airways       Drain                   Closed/Suction Drain 11/09/20 1104 Right Other (Comment) Accordion 10 Fr. 1 day              Arterial Line              Arterial Line 11/09/20 0720 Right Radial 1 day              Peripheral Intravenous Line                    Peripheral IV - Single Lumen 11/09/20 0542 18 G Anterior;Distal;Left Wrist 1 day         Peripheral IV - Single Lumen 11/09/20 1100 16 G Left;Posterior Hand 1 day                  Wounds: No  Wound care consulted: No

## 2020-11-10 NOTE — SUBJECTIVE & OBJECTIVE
Interval History: No acute events overnight, doing well post-operatively, reports some incisional pain. Continuing home AEDs, no reported events.    Current Facility-Administered Medications   Medication Dose Route Frequency Provider Last Rate Last Dose    0.9%  NaCl infusion   Intravenous Continuous Lee Wilkes MD 75 mL/hr at 11/10/20 0815      acetaminophen tablet 650 mg  650 mg Oral Q6H PRN Marisela Shore NP        ceFAZolin injection 2 g  2 g Intravenous Q8H Lee Wilkes MD   2 g at 11/10/20 1245    cloBAZam Tab 40 mg  40 mg Oral BID Lee Wilkes MD   40 mg at 11/10/20 0821    heparin (porcine) injection 5,000 Units  5,000 Units Subcutaneous Q8H Lee Wilkes MD   5,000 Units at 11/10/20 0529    HYDROcodone-acetaminophen 5-325 mg per tablet 1 tablet  1 tablet Oral Q4H PRN Lee Wilkes MD   1 tablet at 11/10/20 0822    HYDROmorphone injection 0.2 mg  0.2 mg Intravenous Q4H PRN Lee Wilkes MD   0.2 mg at 11/10/20 0540    labetalol 20 mg/4 mL (5 mg/mL) IV syring  10 mg Intravenous Q4H PRN Arti Barbosa PA-C        lamoTRIgine tablet 150 mg  150 mg Oral QHS Marisela Shore NP   150 mg at 11/09/20 2135    lamoTRIgine tablet 300 mg  300 mg Oral Daily Marisela Shore NP   300 mg at 11/10/20 0822    mupirocin 2 % ointment   Nasal BID Lee Wilkes MD        nicotine 14 mg/24 hr 1 patch  1 patch Transdermal Daily Marisela Shore NP   1 patch at 11/10/20 0822    ondansetron injection 4 mg  4 mg Intravenous Q6H PRN Nasra Wetzel MD   4 mg at 11/10/20 0540    sertraline tablet 100 mg  100 mg Oral QHS Lee Wilkes MD   100 mg at 11/09/20 2135     Continuous Infusions:   sodium chloride 0.9% 75 mL/hr at 11/10/20 0815       Review of Systems   Constitutional: Positive for fatigue. Negative for fever.   Eyes: Negative for photophobia and visual disturbance.   Respiratory: Negative for cough and shortness of breath.    Cardiovascular: Negative for chest pain and leg  swelling.   Gastrointestinal: Negative for nausea and vomiting.   Skin: Negative for pallor and rash.   Neurological: Positive for headaches. Negative for facial asymmetry, speech difficulty and weakness.   Psychiatric/Behavioral: Negative for agitation and confusion.     Objective:     Vital Signs (Most Recent):  Temp: 98.5 °F (36.9 °C) (11/10/20 0700)  Pulse: 60 (11/10/20 0905)  Resp: (!) 30 (11/10/20 0905)  BP: (!) 103/56 (11/10/20 0905)  SpO2: 99 % (11/10/20 0905) Vital Signs (24h Range):  Temp:  [97.7 °F (36.5 °C)-98.7 °F (37.1 °C)] 98.5 °F (36.9 °C)  Pulse:  [57-70] 60  Resp:  [7-37] 30  SpO2:  [91 %-99 %] 99 %  BP: ()/(48-61) 103/56  Arterial Line BP: ()/(52-65) 104/56     Weight: 61.5 kg (135 lb 9.3 oz)  Body mass index is 22.56 kg/m².    Physical Exam  Vitals signs and nursing note reviewed.   Constitutional:       Appearance: Normal appearance. She is not ill-appearing, toxic-appearing or diaphoretic.   HENT:      Head:      Comments: Surgical incision to R scalp, drain in place     Nose: Nose normal.   Eyes:      General: No scleral icterus.     Extraocular Movements: Extraocular movements intact.      Pupils: Pupils are equal, round, and reactive to light.   Neck:      Musculoskeletal: Normal range of motion and neck supple.   Pulmonary:      Effort: Pulmonary effort is normal. No respiratory distress.   Abdominal:      General: There is no distension.      Palpations: Abdomen is soft.   Musculoskeletal:         General: No deformity or signs of injury.   Skin:     General: Skin is warm and dry.   Neurological:      Mental Status: She is lethargic.      Deep Tendon Reflexes: Strength normal.   Psychiatric:         Mood and Affect: Mood normal.         Behavior: Behavior normal.         NEUROLOGICAL EXAMINATION:     MENTAL STATUS        Arouses easily to voice, oriented and participates in conversation appropriately     CRANIAL NERVES     CN III, IV, VI   Pupils are equal, round, and reactive  to light.    CN VII   Facial expression full, symmetric.     CN VIII   Hearing: intact    CN IX, X   CN IX normal.   CN X normal.     CN XI   CN XI normal.     MOTOR EXAM   Muscle bulk: normal  Overall muscle tone: normal    Strength   Strength 5/5 throughout.       Significant Labs: All pertinent lab results from the past 24 hours have been reviewed.    Significant Studies: I have reviewed all pertinent imaging results/findings within the past 24 hours.

## 2020-11-10 NOTE — PROGRESS NOTES
[facility]  Neurology-Epilepsy  Progress Note    Patient Name: Mary Haywood  MRN: [id number]  Admission Date: 11/9/2020  Hospital Length of Stay: 1 days  Code Status: Prior   Attending Provider: Adriel Acosta MD  Primary Care Physician: Primary Doctor No   Principal Problem:Cavernoma    Subjective:     Hospital Course:   11/9: s/p R temporal craniotomy for resection of cavernoma, home AEDs continued  11/10: No acute events overnight, doing well post-operatively. Continue home AEDs.    Interval History: No acute events overnight, doing well post-operatively, reports some incisional pain. Continuing home AEDs, no reported events.    Current Facility-Administered Medications   Medication Dose Route Frequency Provider Last Rate Last Dose    0.9%  NaCl infusion   Intravenous Continuous Lee Wilkes MD 75 mL/hr at 11/10/20 0815      acetaminophen tablet 650 mg  650 mg Oral Q6H PRN Marisela Shore NP        ceFAZolin injection 2 g  2 g Intravenous Q8H Lee Wilkes MD   2 g at 11/10/20 1245    cloBAZam Tab 40 mg  40 mg Oral BID Lee Wilkes MD   40 mg at 11/10/20 0821    heparin (porcine) injection 5,000 Units  5,000 Units Subcutaneous Q8H Lee Wilkes MD   5,000 Units at 11/10/20 0529    HYDROcodone-acetaminophen 5-325 mg per tablet 1 tablet  1 tablet Oral Q4H PRN Lee Wilkes MD   1 tablet at 11/10/20 0822    HYDROmorphone injection 0.2 mg  0.2 mg Intravenous Q4H PRN Lee Wilkes MD   0.2 mg at 11/10/20 0540    labetalol 20 mg/4 mL (5 mg/mL) IV syring  10 mg Intravenous Q4H PRN Arti Barbosa PA-C        lamoTRIgine tablet 150 mg  150 mg Oral QHS Marisela Shore NP   150 mg at 11/09/20 2135    lamoTRIgine tablet 300 mg  300 mg Oral Daily Marisela Shore NP   300 mg at 11/10/20 0822    mupirocin 2 % ointment   Nasal BID Lee Wilkes MD        nicotine 14 mg/24 hr 1 patch  1 patch Transdermal Daily Marisela Shore, NP   1 patch at 11/10/20 0842     ondansetron injection 4 mg  4 mg Intravenous Q6H PRN Nasra Wetzel MD   4 mg at 11/10/20 0540    sertraline tablet 100 mg  100 mg Oral QHS Lee Wilkes MD   100 mg at 11/09/20 2135     Continuous Infusions:   sodium chloride 0.9% 75 mL/hr at 11/10/20 0815       Review of Systems   Constitutional: Positive for fatigue. Negative for fever.   Eyes: Negative for photophobia and visual disturbance.   Respiratory: Negative for cough and shortness of breath.    Cardiovascular: Negative for chest pain and leg swelling.   Gastrointestinal: Negative for nausea and vomiting.   Skin: Negative for pallor and rash.   Neurological: Positive for headaches. Negative for facial asymmetry, speech difficulty and weakness.   Psychiatric/Behavioral: Negative for agitation and confusion.     Objective:     Vital Signs (Most Recent):  Temp: 98.5 °F (36.9 °C) (11/10/20 0700)  Pulse: 60 (11/10/20 0905)  Resp: (!) 30 (11/10/20 0905)  BP: (!) 103/56 (11/10/20 0905)  SpO2: 99 % (11/10/20 0905) Vital Signs (24h Range):  Temp:  [97.7 °F (36.5 °C)-98.7 °F (37.1 °C)] 98.5 °F (36.9 °C)  Pulse:  [57-70] 60  Resp:  [7-37] 30  SpO2:  [91 %-99 %] 99 %  BP: ()/(48-61) 103/56  Arterial Line BP: ()/(52-65) 104/56     Weight: 61.5 kg (135 lb 9.3 oz)  Body mass index is 22.56 kg/m².    Physical Exam  Vitals signs and nursing note reviewed.   Constitutional:       Appearance: Normal appearance. She is not ill-appearing, toxic-appearing or diaphoretic.   HENT:      Head:      Comments: Surgical incision to R scalp, drain in place     Nose: Nose normal.   Eyes:      General: No scleral icterus.     Extraocular Movements: Extraocular movements intact.      Pupils: Pupils are equal, round, and reactive to light.   Neck:      Musculoskeletal: Normal range of motion and neck supple.   Pulmonary:      Effort: Pulmonary effort is normal. No respiratory distress.   Abdominal:      General: There is no distension.      Palpations: Abdomen is soft.    Musculoskeletal:         General: No deformity or signs of injury.   Skin:     General: Skin is warm and dry.   Neurological:      Mental Status: She is lethargic.      Deep Tendon Reflexes: Strength normal.   Psychiatric:         Mood and Affect: Mood normal.         Behavior: Behavior normal.         NEUROLOGICAL EXAMINATION:     MENTAL STATUS        Arouses easily to voice, oriented and participates in conversation appropriately     CRANIAL NERVES     CN III, IV, VI   Pupils are equal, round, and reactive to light.    CN VII   Facial expression full, symmetric.     CN VIII   Hearing: intact    CN IX, X   CN IX normal.   CN X normal.     CN XI   CN XI normal.     MOTOR EXAM   Muscle bulk: normal  Overall muscle tone: normal    Strength   Strength 5/5 throughout.       Significant Labs: All pertinent lab results from the past 24 hours have been reviewed.    Significant Studies: I have reviewed all pertinent imaging results/findings within the past 24 hours.    Assessment and Plan:     * Cavernoma  Admitted to [facility] s/p R temporal craniotomy for resection of cavernoma 11/9    - NSGY following, repeat imaging timing per NCC/NSGY  - Neurochecks  - Subgaleal drain in place  - PT/OT/SLP pending    Intractable epilepsy  S/p R temporal crani for cavernoma resection 11/9, follows with Dr. Hennessy as outpatient. Presented at multidisciplinary epilepsy surgery conference, recommendation made to proceed with cavernoma resection.    Recommendations:  - Continue outpatient AED regimen - Clobazam 40 mg BID, Lamotrigine 300 mg qAM/150 mg qPM  - Seizure precautions  - Avoid agents that lower seizure threshold  - Post-operative imaging, management per NSGY/[facility]    Plan of care discussed with [facility] team, will continue to follow. Please call with any questions.    Tobacco use  - Nicotine patch as needed    Anxiety and depression  - Continue home Sertraline        VTE Risk Mitigation (From admission, onward)         Ordered     heparin  (porcine) injection 5,000 Units  Every 8 hours      11/09/20 1145     IP VTE HIGH RISK PATIENT  Once      11/09/20 1145     Place sequential compression device  Until discontinued      11/09/20 1145     Place sequential compression device  Until discontinued      11/09/20 0547                Hilda Hamm PA-C  Neurology-Epilepsy  [facility]  Staff: Dr. Christie

## 2020-11-10 NOTE — HPI
36 yo female with epilepsy suspected secondary to R temporal cavernoma now s/p craniotomy for resection (11/9).

## 2020-11-10 NOTE — ASSESSMENT & PLAN NOTE
36 yo female with epilepsy suspected secondary to R temporal cavernoma now s/p craniotomy for resection (11/9).    No acute events overnight. Head CT within normal limits. Neurologically stable on exam. Moderate incisional pain.    --Continue care per primary team.  --Continue subgaleal hemovac drain to full suction.  --Continue prophylactic antibiotics while surgical drain in place.  --Continue blood pressure parameters, SBP < 130.  --Likely remove drain later today.  --We will continue to monitor closely, please contact us with any questions or concerns.

## 2020-11-10 NOTE — PROGRESS NOTES
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    Post-Op Info:  Procedure(s) (LRB):  CRANIOTOMY, USING FRAMELESS STEREOTAXY FOR  RIGHT SIDED RESECTION OF CAVERNOMA (Right)   1 Day Post-Op         Medications:  Continuous Infusions:  Scheduled Meds:   ceFAZolin (ANCEF) IVPB  2 g Intravenous Q8H    cloBAZam  40 mg Oral BID    heparin (porcine)  5,000 Units Subcutaneous Q8H    lamoTRIgine  150 mg Oral QHS    lamoTRIgine  300 mg Oral Daily    mupirocin   Nasal BID    nicotine  1 patch Transdermal Daily    sertraline  100 mg Oral QHS     PRN Meds:acetaminophen, HYDROcodone-acetaminophen, HYDROmorphone, labetalol, ondansetron     Review of Systems  Objective:     Weight: 61.5 kg (135 lb 9.3 oz)  Body mass index is 22.56 kg/m².  Vital Signs (Most Recent):  Temp: 98.4 °F (36.9 °C) (11/10/20 0305)  Pulse: 66 (11/10/20 0605)  Resp: (!) 21 (11/10/20 0605)  BP: (!) 110/58 (11/10/20 0605)  SpO2: 97 % (11/10/20 0605) Vital Signs (24h Range):  Temp:  [97.7 °F (36.5 °C)-98.7 °F (37.1 °C)] 98.4 °F (36.9 °C)  Pulse:  [60-85] 66  Resp:  [11-37] 21  SpO2:  [91 %-100 %] 97 %  BP: ()/(48-61) 110/58  Arterial Line BP: ()/(53-65) 101/57                          Closed/Suction Drain 11/09/20 1104 Right Other (Comment) Accordion 10 Fr. (Active)   Site Description Unable to view 11/10/20 0305   Dressing Type Gauze 11/10/20 0305   Dressing Status Clean;Dry;Intact 11/10/20 0305   Dressing Intervention Integrity maintained 11/10/20 0305   Drainage Bloody 11/10/20 0305   Status Other (Comment) 11/10/20 0305   Output (mL) 15 mL 11/10/20 0505       Neurosurgery Physical Exam    GCS [id number]    Pupils: Equal and Reactive.   Extraocular Movements: Conjugate, normal movement.      Motor Exam:  Left Upper Extremity:Follows Commands.  Right Upper Extremity: Follows  Commands.  Left Lower Extremity: Follows Commands.  Right Lower Extremity: Follows Commands.            Significant Labs:  Recent Labs   Lab 11/09/20  1154 11/10/20  0116   * 92    140   K 4.2 3.7   * 109   CO2 24 26   BUN 4* 3*   CREATININE 0.7 0.7   CALCIUM 7.7* 8.1*   MG  --  2.1     Recent Labs   Lab 11/09/20  1154 11/10/20  0116   WBC 4.34 6.12   HGB 12.9 11.8*   HCT 37.5 35.3*    196     Recent Labs   Lab 11/09/20  0542   INR 0.9   APTT 33.0*     Microbiology Results (last 7 days)     ** No results found for the last 168 hours. **        All pertinent labs from the last 24 hours have been reviewed.    Significant Diagnostics:  I have reviewed all pertinent imaging results/findings within the past 24 hours.    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    No acute events overnight. Head CT within normal limits. Neurologically stable on exam. Moderate incisional pain.    --Continue care per primary team.  --Continue subgaleal hemovac drain to full suction.  --Continue prophylactic antibiotics while surgical drain in place.  --Continue blood pressure parameters, SBP < 130.  --Likely remove drain later today.  --We will continue to monitor closely, please contact us with any questions or concerns.         Lee Wilkes MD  Neurosurgery  [facility]

## 2020-11-11 LAB
ALBUMIN SERPL BCP-MCNC: 3.1 G/DL (ref 3.5–5.2)
ALP SERPL-CCNC: 70 U/L (ref 55–135)
ALT SERPL W/O P-5'-P-CCNC: 5 U/L (ref 10–44)
ANION GAP SERPL CALC-SCNC: 7 MMOL/L (ref 8–16)
AST SERPL-CCNC: 11 U/L (ref 10–40)
BASOPHILS # BLD AUTO: 0.02 K/UL (ref 0–0.2)
BASOPHILS NFR BLD: 0.3 % (ref 0–1.9)
BILIRUB SERPL-MCNC: 0.3 MG/DL (ref 0.1–1)
BUN SERPL-MCNC: 2 MG/DL (ref 6–20)
CALCIUM SERPL-MCNC: 7.9 MG/DL (ref 8.7–10.5)
CHLORIDE SERPL-SCNC: 106 MMOL/L (ref 95–110)
CO2 SERPL-SCNC: 27 MMOL/L (ref 23–29)
CREAT SERPL-MCNC: 0.6 MG/DL (ref 0.5–1.4)
DIFFERENTIAL METHOD: ABNORMAL
EOSINOPHIL # BLD AUTO: 0.1 K/UL (ref 0–0.5)
EOSINOPHIL NFR BLD: 1.4 % (ref 0–8)
ERYTHROCYTE [DISTWIDTH] IN BLOOD BY AUTOMATED COUNT: 13.2 % (ref 11.5–14.5)
EST. GFR  (AFRICAN AMERICAN): >60 ML/MIN/1.73 M^2
EST. GFR  (NON AFRICAN AMERICAN): >60 ML/MIN/1.73 M^2
FINAL PATHOLOGIC DIAGNOSIS: NORMAL
GLUCOSE SERPL-MCNC: 82 MG/DL (ref 70–110)
GROSS: NORMAL
HCT VFR BLD AUTO: 34.4 % (ref 37–48.5)
HGB BLD-MCNC: 11.4 G/DL (ref 12–16)
IMM GRANULOCYTES # BLD AUTO: 0.02 K/UL (ref 0–0.04)
IMM GRANULOCYTES NFR BLD AUTO: 0.3 % (ref 0–0.5)
LYMPHOCYTES # BLD AUTO: 1.7 K/UL (ref 1–4.8)
LYMPHOCYTES NFR BLD: 29.4 % (ref 18–48)
Lab: NORMAL
MAGNESIUM SERPL-MCNC: 1.7 MG/DL (ref 1.6–2.6)
MCH RBC QN AUTO: 34.3 PG (ref 27–31)
MCHC RBC AUTO-ENTMCNC: 33.1 G/DL (ref 32–36)
MCV RBC AUTO: 104 FL (ref 82–98)
MICROSCOPIC EXAM: NORMAL
MONOCYTES # BLD AUTO: 0.4 K/UL (ref 0.3–1)
MONOCYTES NFR BLD: 7.3 % (ref 4–15)
NEUTROPHILS # BLD AUTO: 3.6 K/UL (ref 1.8–7.7)
NEUTROPHILS NFR BLD: 61.3 % (ref 38–73)
NRBC BLD-RTO: 0 /100 WBC
PHOSPHATE SERPL-MCNC: 3.2 MG/DL (ref 2.7–4.5)
PLATELET # BLD AUTO: 174 K/UL (ref 150–350)
PMV BLD AUTO: 10.5 FL (ref 9.2–12.9)
POTASSIUM SERPL-SCNC: 3.5 MMOL/L (ref 3.5–5.1)
PROT SERPL-MCNC: 5.3 G/DL (ref 6–8.4)
RBC # BLD AUTO: 3.32 M/UL (ref 4–5.4)
SODIUM SERPL-SCNC: 140 MMOL/L (ref 136–145)
WBC # BLD AUTO: 5.86 K/UL (ref 3.9–12.7)

## 2020-11-11 PROCEDURE — 99233 SBSQ HOSP IP/OBS HIGH 50: CPT | Mod: GC,,, | Performed by: PSYCHIATRY & NEUROLOGY

## 2020-11-11 PROCEDURE — 80053 COMPREHEN METABOLIC PANEL: CPT

## 2020-11-11 PROCEDURE — 11000001 HC ACUTE MED/SURG PRIVATE ROOM

## 2020-11-11 PROCEDURE — 63600175 PHARM REV CODE 636 W HCPCS: Performed by: STUDENT IN AN ORGANIZED HEALTH CARE EDUCATION/TRAINING PROGRAM

## 2020-11-11 PROCEDURE — 25000003 PHARM REV CODE 250: Performed by: NURSE PRACTITIONER

## 2020-11-11 PROCEDURE — 25000003 PHARM REV CODE 250: Performed by: PHYSICIAN ASSISTANT

## 2020-11-11 PROCEDURE — 99233 PR SUBSEQUENT HOSPITAL CARE,LEVL III: ICD-10-PCS | Mod: GC,,, | Performed by: PSYCHIATRY & NEUROLOGY

## 2020-11-11 PROCEDURE — 25000003 PHARM REV CODE 250: Performed by: STUDENT IN AN ORGANIZED HEALTH CARE EDUCATION/TRAINING PROGRAM

## 2020-11-11 PROCEDURE — 99233 PR SUBSEQUENT HOSPITAL CARE,LEVL III: ICD-10-PCS | Mod: ,,, | Performed by: PSYCHIATRY & NEUROLOGY

## 2020-11-11 PROCEDURE — 63600175 PHARM REV CODE 636 W HCPCS: Performed by: NEUROLOGICAL SURGERY

## 2020-11-11 PROCEDURE — 94761 N-INVAS EAR/PLS OXIMETRY MLT: CPT

## 2020-11-11 PROCEDURE — 85025 COMPLETE CBC W/AUTO DIFF WBC: CPT

## 2020-11-11 PROCEDURE — 99233 SBSQ HOSP IP/OBS HIGH 50: CPT | Mod: ,,, | Performed by: PSYCHIATRY & NEUROLOGY

## 2020-11-11 PROCEDURE — 84100 ASSAY OF PHOSPHORUS: CPT

## 2020-11-11 PROCEDURE — 83735 ASSAY OF MAGNESIUM: CPT

## 2020-11-11 PROCEDURE — 25000003 PHARM REV CODE 250: Performed by: PSYCHIATRY & NEUROLOGY

## 2020-11-11 PROCEDURE — 97162 PT EVAL MOD COMPLEX 30 MIN: CPT

## 2020-11-11 RX ORDER — POTASSIUM CHLORIDE 1.5 G/1.58G
40 POWDER, FOR SOLUTION ORAL ONCE
Status: COMPLETED | OUTPATIENT
Start: 2020-11-11 | End: 2020-11-11

## 2020-11-11 RX ORDER — HYDROCODONE BITARTRATE AND ACETAMINOPHEN 5; 325 MG/1; MG/1
1 TABLET ORAL EVERY 6 HOURS PRN
Qty: 56 TABLET | Refills: 0 | Status: SHIPPED | OUTPATIENT
Start: 2020-11-11 | End: 2020-11-26

## 2020-11-11 RX ORDER — BACITRACIN 500 [USP'U]/G
OINTMENT TOPICAL 2 TIMES DAILY
Status: DISCONTINUED | OUTPATIENT
Start: 2020-11-11 | End: 2020-11-12 | Stop reason: HOSPADM

## 2020-11-11 RX ORDER — AMOXICILLIN 250 MG
1 CAPSULE ORAL 2 TIMES DAILY
Status: DISCONTINUED | OUTPATIENT
Start: 2020-11-11 | End: 2020-11-12 | Stop reason: HOSPADM

## 2020-11-11 RX ORDER — AMOXICILLIN 250 MG
1 CAPSULE ORAL 2 TIMES DAILY
Qty: 28 TABLET | Refills: 0 | Status: SHIPPED | OUTPATIENT
Start: 2020-11-11 | End: 2020-11-26

## 2020-11-11 RX ADMIN — HYDROCODONE BITARTRATE AND ACETAMINOPHEN 1 TABLET: 5; 325 TABLET ORAL at 02:11

## 2020-11-11 RX ADMIN — CEFAZOLIN 2 G: 1 INJECTION, POWDER, FOR SOLUTION INTRAMUSCULAR; INTRAVENOUS at 02:11

## 2020-11-11 RX ADMIN — ONDANSETRON 4 MG: 2 INJECTION INTRAMUSCULAR; INTRAVENOUS at 02:11

## 2020-11-11 RX ADMIN — SERTRALINE HYDROCHLORIDE 100 MG: 100 TABLET ORAL at 09:11

## 2020-11-11 RX ADMIN — HYDROCODONE BITARTRATE AND ACETAMINOPHEN 1 TABLET: 5; 325 TABLET ORAL at 08:11

## 2020-11-11 RX ADMIN — HEPARIN SODIUM 5000 UNITS: 5000 INJECTION INTRAVENOUS; SUBCUTANEOUS at 06:11

## 2020-11-11 RX ADMIN — CEFAZOLIN 2 G: 1 INJECTION, POWDER, FOR SOLUTION INTRAMUSCULAR; INTRAVENOUS at 04:11

## 2020-11-11 RX ADMIN — BACITRACIN: 500 OINTMENT TOPICAL at 09:11

## 2020-11-11 RX ADMIN — POTASSIUM CHLORIDE 40 MEQ: 1.5 POWDER, FOR SOLUTION ORAL at 02:11

## 2020-11-11 RX ADMIN — LAMOTRIGINE 150 MG: 150 TABLET ORAL at 09:11

## 2020-11-11 RX ADMIN — MUPIROCIN: 20 OINTMENT TOPICAL at 08:11

## 2020-11-11 RX ADMIN — MUPIROCIN: 20 OINTMENT TOPICAL at 09:11

## 2020-11-11 RX ADMIN — LAMOTRIGINE 300 MG: 100 TABLET ORAL at 08:11

## 2020-11-11 RX ADMIN — ACETAMINOPHEN 650 MG: 325 TABLET ORAL at 08:11

## 2020-11-11 RX ADMIN — HEPARIN SODIUM 5000 UNITS: 5000 INJECTION INTRAVENOUS; SUBCUTANEOUS at 09:11

## 2020-11-11 RX ADMIN — CLOBAZAM 40 MG: 10 TABLET ORAL at 09:11

## 2020-11-11 RX ADMIN — HYDROCODONE BITARTRATE AND ACETAMINOPHEN 1 TABLET: 5; 325 TABLET ORAL at 09:11

## 2020-11-11 RX ADMIN — CLOBAZAM 40 MG: 10 TABLET ORAL at 08:11

## 2020-11-11 NOTE — NURSING
Nursing Transfer Note       Transfer to [facility] from [facility]    Transfer via wheelchair    Transfer with tele    Transported by RN    Medicines sent: yes    Chart sent with patient: Yes    Belongings sent with patient: father had all belongings    Notified: father    Bedside Neuro assessment performed: Yes    Bedside Handoff given to: REGINALDO Powell    Upon arrival to floor: patient oriented to room, call bell in reach and bed in lowest position

## 2020-11-11 NOTE — PROGRESS NOTES
[facility]  Neurocritical Care  Progress Note    Admit Date: 11/9/2020  Service Date: 11/10/2020  Length of Stay: 1    Subjective:     Chief Complaint: Cavernoma    History of Present Illness: Patient is a 35 y.o. 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 Will admit to [facility] for post-op monitoring.    Hospital Course: 11/09/2020: s/p R crani for resection of R temporal lobe cavernoma.  11/10: POD1, subgaleal drain removal today?    Interval history: NAEON. POD1, no complications. Pt complains of HA that seems to be incisional. Subgaleal drain in place, planned for removal today. Continue SBP <130 per NSGY. Continue frequent neuro checks. CTH in AM for f/u.     Review of Systems: Review of Systems   Constitutional: Negative for chills, fever and weight loss.   HENT: Negative for congestion, nosebleeds and sinus pain.    Eyes: Negative for blurred vision, double vision and photophobia.   Respiratory: Negative for cough, sputum production and shortness of breath.    Cardiovascular: Negative for chest pain, palpitations and leg swelling.   Gastrointestinal: Positive for nausea and vomiting. Negative for heartburn.   Genitourinary: Negative for dysuria, frequency and urgency.   Musculoskeletal: Negative for back pain, joint pain and neck pain.   Skin: Negative for itching and rash.   Neurological: Positive for headaches. Negative for dizziness, sensory change and speech change.     Vitals:   Temp: 98.7 °F (37.1 °C)  Pulse: 60  Rhythm: normal sinus rhythm  BP: (!) 99/57  MAP (mmHg): 76  Resp: 20  SpO2: (!) 93 %  O2 Device (Oxygen Therapy): room air    Temp  Min: 97.7 °F (36.5 °C)  Max: 98.7 °F (37.1 °C)  Pulse  Min: 57  Max: 71  BP  Min: 90/52  Max: 113/58  MAP (mmHg)  Min: 66  Max: 81  Resp  Min: 7  Max: 30  SpO2  Min: 93 %  Max: 99 %    11/09 0701 - 11/10 0700  In: 3150 [P.O.:650; I.V.:2500]  Out: 3785 [Urine:3525; Drains:260]    Unmeasured Output  Stool Occurrence: 0   Examination:   Constitutional: Well-nourished and -developed. No apparent distress.   Eyes: Conjunctiva clear, anicteric. Lids no lesions.  Head/Ears/Nose/Mouth/Throat/Neck: Surgical incision to R scalp. Moist mucous membranes. External ears, nose atraumatic.   Cardiovascular: Regular rhythm. No leg edema.  Respiratory: Comfortable respirations. Clear to auscultation.  Gastrointestinal: Soft, nondistended, nontender. + bowel sounds.    Neurologic:   -GCS E3V5M6  -Drowsy. Oriented to person, place, and time. Speech fluent. Follows commands. Recent and remote memory adequate. Judgment good. Insight appropriate.  -Cranial nerves: EOM intact, PERRL 4mm, no facial droop, +cough  -Strength: Moves all extremities spontaneously, antigravity  -Sensation: Intact to light touch.    Today I independently reviewed pertinent medications, lines/drains/airways, imaging, cardiology results, laboratory results,     Assessment/Plan:     Neuro  Intractable epilepsy  Continue home AEDs:  Clobazam 40mg BID  Lamotrigine 450mg Daily    Psychiatric  Anxiety and depression  Continue sertraline    Derm  * Cavernoma  R temporal cavernoma  S/p Crani for resection 11/9    -Admit to [facility]  -NSGY following  -Neuro checks, Vital signs q1hr  -SBP Goal < 130 for 24 hrs, then SBP Goal < 160  -Subgaleal drain in place  -HOB 30 deg  -ADAT, IVF until tolerating diet  -Mechanical SCDs  -PT/OT/SLP  -Seizure precautions  -11/10: NSGY plan to remove subgaleal drain today    Other  Tobacco use  1 PPD smoker per father  Nicotine patch as needed          The patient is being Prophylaxed for:  Venous Thromboembolism with: Chemical  Stress Ulcer with: Not Applicable   Ventilator Pneumonia with: not applicable    Activity Orders          Diet Adult Regular (IDDSI Level 7): Regular starting at 11/09 1145        Prior    Arti Barbosa PA-C  Neurocritical Care  [facility]

## 2020-11-11 NOTE — PROGRESS NOTES
[facility]  Neurocritical Care  Progress Note    Admit Date: 11/9/2020  Service Date: 11/11/2020  Length of Stay: 2    Subjective:     Chief Complaint: Cavernoma    History of Present Illness: Patient is a 35 y.o. 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 Will admit to [facility] for post-op monitoring.    Hospital Course: 11/09/2020: s/p R crani for resection of R temporal lobe cavernoma.  11/10: POD1. NAEO subgaleal drain removed today  11/11/2020: NAEO. Step down to NSGY today. Pt/Ot to evaluate and treat.    Interval History:  NAEO. Patient remains afebrile and HD stable. Drain out. PT/OT to evaluate and treat. Patient to step down today.     Review of Systems   Constitutional: Negative for chills, diaphoresis, fatigue, fever and unexpected weight change.   HENT: Negative for congestion, rhinorrhea and trouble swallowing.    Eyes: Negative for pain and visual disturbance.   Respiratory: Negative for cough, chest tightness and shortness of breath.    Cardiovascular: Negative for chest pain and leg swelling.   Gastrointestinal: Negative for abdominal pain, diarrhea, nausea and vomiting.   Genitourinary: Negative for difficulty urinating and urgency.   Musculoskeletal: Negative for arthralgias and myalgias.   Skin: Negative for color change.   Neurological: Positive for weakness and headaches. Negative for dizziness and syncope.       Objective:     Vitals:  Temp: 98.8 °F (37.1 °C)  Pulse: 61  Rhythm: sinus bradycardia  BP: (!) 93/52  MAP (mmHg): 67  Resp: 14  SpO2: (!) 92 %  O2 Device (Oxygen Therapy): room air    Temp  Min: 98.4 °F (36.9 °C)  Max: 98.8 °F (37.1 °C)  Pulse  Min: 53  Max: 71  BP  Min: 91/55  Max: 113/58  MAP (mmHg)  Min: 67  Max: 81  Resp  Min: 11  Max: 32  SpO2  Min: 92 %  Max: 97 %    11/10 0701 - 11/11 0700  In: 1556.3 [I.V.:1556.3]  Out: 1570 [Urine:1450; Drains:120]   Unmeasured Output  Urine Occurrence: 1  Stool  Occurrence: 0       Physical Exam  Vitals signs and nursing note reviewed.   Constitutional:       Appearance: Normal appearance. She is not ill-appearing, toxic-appearing or diaphoretic.   HENT:      Head:      Comments: Surgical incision to R scalp, drain removed     Nose: Nose normal.   Eyes:      General: No scleral icterus.     Extraocular Movements: Extraocular movements intact.      Pupils: Pupils are equal, round, and reactive to light.   Neck:      Musculoskeletal: Normal range of motion and neck supple.   Pulmonary:      Effort: Pulmonary effort is normal. No respiratory distress.   Abdominal:      General: There is no distension.      Palpations: Abdomen is soft.   Musculoskeletal:         General: No deformity or signs of injury.   Skin:     General: Skin is warm and dry.   Neurological:      Mental Status: She is lethargic.   Psychiatric:         Mood and Affect: Mood normal.         Behavior: Behavior normal.         Medications:  Continuous ScheduledceFAZolin (ANCEF) IVPB, 2 g, Q8H  cloBAZam, 40 mg, BID  heparin (porcine), 5,000 Units, Q8H  lamoTRIgine, 150 mg, QHS  lamoTRIgine, 300 mg, Daily  mupirocin, , BID  nicotine, 1 patch, Daily  senna-docusate 8.6-50 mg, 1 tablet, BID  sertraline, 100 mg, QHS    PRNacetaminophen, 650 mg, Q6H PRN  HYDROcodone-acetaminophen, 1 tablet, Q4H PRN  HYDROmorphone, 0.2 mg, Q4H PRN  labetalol, 10 mg, Q4H PRN  ondansetron, 4 mg, Q6H PRN      Today I personally reviewed pertinent medications, lines/drains/airways, imaging, laboratory results, notably:    Diet  Diet Adult Regular (IDDSI Level 7)          Assessment/Plan:     Neuro  Intractable epilepsy  - Continue home AEDs: Clobazam 40mg BID and Lamotrigine 450mg Daily    Psychiatric  Anxiety and depression  - Continue sertraline    Derm  * Cavernoma  R temporal cavernoma  S/p Crani for resection 11/9    -Admit to [facility]  -NSGY following  -Neuro checks, Vital signs q1hr  -SBP Goal < 130 for 24 hrs, then SBP Goal <  160  -Subgaleal drain removed 11/10/20  -HOB 30 deg  -ADAT, IVF until tolerating diet  -Mechanical SCDs  -PT/OT/SLP  -Seizure precautions  - Patient to step down to neurosurgery today    Other  Tobacco use  - 1 PPD smoker per father  - Nicotine patch as needed  - Smoking cessation and education provided          The patient is being Prophylaxed for:  Venous Thromboembolism with: Chemical  Stress Ulcer with: Not Applicable   Ventilator Pneumonia with: not applicable    Activity Orders          Diet Adult Regular (IDDSI Level 7): Regular starting at 11/09 1145        Prior    Yoel Lakhani DO [facility]

## 2020-11-11 NOTE — PROGRESS NOTES
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    Post-Op Info:  Procedure(s) (LRB):  CRANIOTOMY, USING FRAMELESS STEREOTAXY FOR  RIGHT SIDED RESECTION OF CAVERNOMA (Right)   2 Days Post-Op     Interval History: NAEON, drain output 120, less bloody than yesterday     Medications:  Continuous Infusions:   sodium chloride 0.9% 75 mL/hr at 11/11/20 0902     Scheduled Meds:   ceFAZolin (ANCEF) IVPB  2 g Intravenous Q8H    cloBAZam  40 mg Oral BID    heparin (porcine)  5,000 Units Subcutaneous Q8H    lamoTRIgine  150 mg Oral QHS    lamoTRIgine  300 mg Oral Daily    mupirocin   Nasal BID    nicotine  1 patch Transdermal Daily    sertraline  100 mg Oral QHS     PRN Meds:acetaminophen, HYDROcodone-acetaminophen, HYDROmorphone, labetalol, ondansetron     Review of Systems  Objective:     Weight: 61.5 kg (135 lb 9.3 oz)  Body mass index is 22.56 kg/m².  Vital Signs (Most Recent):  Temp: 98.8 °F (37.1 °C) (11/11/20 0702)  Pulse: 67 (11/11/20 0902)  Resp: 12 (11/11/20 0902)  BP: 100/60 (11/11/20 0902)  SpO2: 95 % (11/11/20 0902) Vital Signs (24h Range):  Temp:  [98.4 °F (36.9 °C)-98.8 °F (37.1 °C)] 98.8 °F (37.1 °C)  Pulse:  [53-71] 67  Resp:  [11-32] 12  SpO2:  [92 %-97 %] 95 %  BP: ()/(53-65) 100/60  Arterial Line BP: (100)/(54) 100/54     Date 11/11/20 0700 - 11/12/20 0659   Shift 7043-8609 2532-8243 0043-5793 24 Hour Total   INTAKE   I.V.(mL/kg) 226.3(3.7)   226.3(3.7)   Shift Total(mL/kg) 226.3(3.7)   226.3(3.7)   OUTPUT   Urine(mL/kg/hr) 500   500   Shift Total(mL/kg) 500(8.1)   500(8.1)   Weight (kg) 61.5 61.5 61.5 61.5              Resp Rate Total:  [20 br/min] 20 br/min         Closed/Suction Drain 11/09/20 1104 Right Other (Comment) Accordion 10 Fr. (Active)   Site Description Unable to view 11/11/20 0702   Dressing Type Gauze  11/11/20 0702   Dressing Status Clean;Dry;Intact 11/11/20 0702   Dressing Intervention Integrity maintained 11/11/20 0702   Drainage Bloody 11/11/20 0702   Status Other (Comment) 11/11/20 0702   Output (mL) 30 mL 11/11/20 0501       Physical Exam:    Constitutional: No distress.     Eyes: Pupils are equal, round, and reactive to light. Conjunctivae and EOM are normal.     Cardiovascular: Normal rate and regular rhythm.     Abdominal: Soft.     Psych/Behavior: She is alert. She has a normal mood and affect.      Neuro:   GCS15  AOx3  PERRL/EOMI  Moves all with good strength  No drift  Right cranial wound c/d/i      Significant Labs:  Recent Labs   Lab 11/09/20  1154 11/10/20  0116 11/11/20  0414   * 92 82    140 140   K 4.2 3.7 3.5   * 109 106   CO2 24 26 27   BUN 4* 3* 2*   CREATININE 0.7 0.7 0.6   CALCIUM 7.7* 8.1* 7.9*   MG  --  2.1 1.7     Recent Labs   Lab 11/09/20  1154 11/10/20  0116 11/11/20  0414   WBC 4.34 6.12 5.86   HGB 12.9 11.8* 11.4*   HCT 37.5 35.3* 34.4*    196 174     No results for input(s): LABPT, INR, APTT in the last 48 hours.  Microbiology Results (last 7 days)     ** No results found for the last 168 hours. **        All pertinent labs from the last 24 hours have been reviewed.    Significant Diagnostics:  I have reviewed and interpreted all pertinent imaging results/findings within the past 24 hours.    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    Drain output clearing, will remove drain today   OK for TTF to neurosurgery today     --Continue care per primary team.  --Continue prophylactic antibiotics while surgical drain in place.  --Continue blood pressure parameters, SBP < 140.  --Continue home medications  --H/H 11/34 this morning  --PPx: SCD, SQH, IS, PPI, BR  --We will continue to monitor closely, please contact us with any questions or concerns.    -- Please alert neurosurgery with any change in exam           Milena Escobar MD  Neurosurgery  [facility]

## 2020-11-11 NOTE — PROGRESS NOTES
[facility]  Neurology-Epilepsy  Progress Note    Patient Name: Mary Haywood  MRN: [id number]  Admission Date: 11/9/2020  Hospital Length of Stay: 2 days  Code Status: Prior   Attending Provider: Adriel Acosta MD  Primary Care Physician: Primary Doctor No   Principal Problem:Cavernoma    Subjective:     Hospital Course:   11/9: s/p R temporal craniotomy for resection of cavernoma, home AEDs continued  11/10: No acute events overnight, doing well post-operatively. Continue home AEDs.  11/11: No events overnight, no reported seizures. Continuing home AEDs. Stepped down to NSGY service.    Interval History: No acute events overnight, resting throughout the day. Pending stepdown to neurosurgery service.    Current Facility-Administered Medications   Medication Dose Route Frequency Provider Last Rate Last Dose    acetaminophen tablet 650 mg  650 mg Oral Q6H PRN Marisela Shore NP   650 mg at 11/11/20 0849    ceFAZolin injection 2 g  2 g Intravenous Q8H Lee Wilkes MD   2 g at 11/11/20 0415    cloBAZam Tab 40 mg  40 mg Oral BID Lee Wilkes MD   40 mg at 11/11/20 0850    heparin (porcine) injection 5,000 Units  5,000 Units Subcutaneous Q8H Lee Wilkes MD   5,000 Units at 11/11/20 0616    HYDROcodone-acetaminophen 5-325 mg per tablet 1 tablet  1 tablet Oral Q4H PRN Lee Wilkes MD   1 tablet at 11/11/20 0850    HYDROmorphone injection 0.2 mg  0.2 mg Intravenous Q4H PRN Lee Wilkes MD   0.2 mg at 11/10/20 1822    labetalol 20 mg/4 mL (5 mg/mL) IV syring  10 mg Intravenous Q4H PRN Arti Barbosa PA-C        lamoTRIgine tablet 150 mg  150 mg Oral QHS Marisela Shore NP   150 mg at 11/10/20 2159    lamoTRIgine tablet 300 mg  300 mg Oral Daily Marisela Shore NP   300 mg at 11/11/20 0850    mupirocin 2 % ointment   Nasal BID Lee Wilkes MD        nicotine 14 mg/24 hr 1 patch  1 patch Transdermal Daily Marisela Shore NP   1 patch at 11/10/20 0883     ondansetron injection 4 mg  4 mg Intravenous Q6H PRN Nasra Wetzel MD   4 mg at 11/10/20 2235    potassium chloride packet 40 mEq  40 mEq Oral Once Adriel Acosta MD        senna-docusate 8.6-50 mg per tablet 1 tablet  1 tablet Oral BID Adriel Acosta MD        sertraline tablet 100 mg  100 mg Oral QHS Lee Wilkes MD   100 mg at 11/10/20 2158     Continuous Infusions:    Review of Systems   Constitutional: Positive for fatigue. Negative for fever.   Eyes: Negative for photophobia and visual disturbance.   Respiratory: Negative for cough and shortness of breath.    Cardiovascular: Negative for chest pain and leg swelling.   Gastrointestinal: Negative for nausea and vomiting.   Skin: Negative for pallor and rash.   Neurological: Positive for headaches. Negative for facial asymmetry, speech difficulty and weakness.   Psychiatric/Behavioral: Negative for agitation and confusion.     Objective:     Vital Signs (Most Recent):  Temp: 98.3 °F (36.8 °C) (11/11/20 1100)  Pulse: 61 (11/11/20 1002)  Resp: 14 (11/11/20 1002)  BP: (!) 102/57 (11/11/20 1100)  SpO2: (!) 92 % (11/11/20 1002) Vital Signs (24h Range):  Temp:  [98.3 °F (36.8 °C)-98.8 °F (37.1 °C)] 98.3 °F (36.8 °C)  Pulse:  [53-67] 61  Resp:  [11-32] 14  SpO2:  [92 %-97 %] 92 %  BP: ()/(52-65) 102/57     Weight: 61.5 kg (135 lb 9.3 oz)  Body mass index is 22.56 kg/m².    Physical Exam  Vitals signs and nursing note reviewed.   Constitutional:       Appearance: Normal appearance. She is not ill-appearing, toxic-appearing or diaphoretic.   HENT:      Head:      Comments: Surgical incision to R scalp, drain in place     Nose: Nose normal.   Eyes:      General: No scleral icterus.     Extraocular Movements: Extraocular movements intact.      Pupils: Pupils are equal, round, and reactive to light.   Neck:      Musculoskeletal: Normal range of motion and neck supple.   Pulmonary:      Effort: Pulmonary effort is normal. No respiratory distress.    Abdominal:      General: There is no distension.      Palpations: Abdomen is soft.   Musculoskeletal:         General: No deformity or signs of injury.   Skin:     General: Skin is warm and dry.   Neurological:      Mental Status: She is lethargic.      Deep Tendon Reflexes: Strength normal.   Psychiatric:         Mood and Affect: Mood normal.         Behavior: Behavior normal.         NEUROLOGICAL EXAMINATION:     MENTAL STATUS        Arouses easily to voice, oriented and participates in conversation appropriately     CRANIAL NERVES     CN III, IV, VI   Pupils are equal, round, and reactive to light.    CN VII   Facial expression full, symmetric.     CN VIII   Hearing: intact    CN IX, X   CN IX normal.   CN X normal.     CN XI   CN XI normal.     MOTOR EXAM   Muscle bulk: normal  Overall muscle tone: normal    Strength   Strength 5/5 throughout.       Significant Labs: All pertinent lab results from the past 24 hours have been reviewed.    Significant Studies: I have reviewed all pertinent imaging results/findings within the past 24 hours.    Assessment and Plan:     * Cavernoma  Admitted to [facility] s/p R temporal craniotomy for resection of cavernoma 11/9    - [facility] following, repeat imaging timing per [facility]/NSGY  - Neurochecks  - Subgaleal drain removed by NSGY  - PT/OT/SLP pending  - Stepped down to [facility] 11/11    Intractable epilepsy  S/p R temporal crani for cavernoma resection 11/9, follows with Dr. Hennessy as outpatient. Presented at multidisciplinary epilepsy surgery conference, recommendation made to proceed with cavernoma resection.    Recommendations:  - Continue outpatient AED regimen - Clobazam 40 mg BID, Lamotrigine 300 mg qAM/150 mg qPM  - Seizure precautions  - Avoid agents that lower seizure threshold  - Post-operative imaging, management per NS    Plan of care discussed with [facility] team, will continue to follow peripherally. Please call with any questions.    Tobacco use  - Nicotine patch as  needed    Anxiety and depression  - Continue home Sertraline        VTE Risk Mitigation (From admission, onward)         Ordered     heparin (porcine) injection 5,000 Units  Every 8 hours      11/09/20 1145     IP VTE HIGH RISK PATIENT  Once      11/09/20 1145     Place sequential compression device  Until discontinued      11/09/20 1145     Place sequential compression device  Until discontinued      11/09/20 0547                Hilda Hamm PA-C  Neurology-Epilepsy  [facility]  Staff: Dr. Christie

## 2020-11-11 NOTE — ASSESSMENT & PLAN NOTE
Admitted to [facility] s/p R temporal craniotomy for resection of cavernoma 11/9    - NSGY following, repeat imaging timing per [facility]/NSGY  - Neurochecks  - Subgaleal drain removed by NSGY  - PT/OT/SLP pending  - Stepped down to NSGY 11/11

## 2020-11-11 NOTE — PLAN OF CARE
Plan of Care:  Discharge Recommendation: Home, no PT needs.  Please continue Progressive Mobility Protocol as appropriate.  Appropriate transfer level with nursing staff: Safe to transfer to bedside chair/bedside commode: stand pivot with 1 person.    Maria Luz Vang PT, DPT  2020  Pager: [phone]    Physical Therapy Treatment Goals:  Multidisciplinary Problems       Physical Therapy Goals          Problem: Physical Therapy Goal    Goal Priority Disciplines Outcome Goal Variances Interventions   Physical Therapy Goal     PT, PT/OT Ongoing, Progressing     Description: Goals to be met by: 2020    Patient will increase functional independence with mobility by performin. Supine <> sit with Supervision.  2. Sit <> stand transfer with Supervision using No Assistive Device.  3. Bed <> chair transfer via Stand Pivot with Supervision using No Assistive Device.  4. Gait  x 150 feet with Supervision using No Assistive Device to prepare for community ambulation and endurance activities.

## 2020-11-11 NOTE — ASSESSMENT & PLAN NOTE
R temporal cavernoma  S/p Crani for resection 11/9    -Admit to [facility]  -NSGY following  -Neuro checks, Vital signs q1hr  -SBP Goal < 130 for 24 hrs, then SBP Goal < 160  -Subgaleal drain removed 11/10/20  -HOB 30 deg  -ADAT, IVF until tolerating diet  -Mechanical SCDs  -PT/OT/SLP  -Seizure precautions  - Patient to step down to neurosurgery today

## 2020-11-11 NOTE — ASSESSMENT & PLAN NOTE
R temporal cavernoma  S/p Crani for resection 11/9    -Admit to [facility]  -NSGY following  -Neuro checks, Vital signs q1hr  -SBP Goal < 130 for 24 hrs, then SBP Goal < 160  -Subgaleal drain in place  -HOB 30 deg  -ADAT, IVF until tolerating diet  -Mechanical SCDs  -PT/OT/SLP  -Seizure precautions  -11/10: NSGY plan to remove subgaleal drain today

## 2020-11-11 NOTE — ASSESSMENT & PLAN NOTE
36 yo female with epilepsy suspected secondary to R temporal cavernoma now s/p craniotomy for resection (11/9).      --Continue care per primary team.  --Continue subgaleal hemovac drain to full suction, will consider removal today  --Continue prophylactic antibiotics while surgical drain in place.  --Continue blood pressure parameters, SBP < 140.  --We will continue to monitor closely, please contact us with any questions or concerns.   --Anticipate stepdown this AM, will d/w staff

## 2020-11-11 NOTE — ASSESSMENT & PLAN NOTE
S/p R temporal crani for cavernoma resection 11/9, follows with Dr. Hennessy as outpatient. Presented at multidisciplinary epilepsy surgery conference, recommendation made to proceed with cavernoma resection.    Recommendations:  - Continue outpatient AED regimen - Clobazam 40 mg BID, Lamotrigine 300 mg qAM/150 mg qPM  - Seizure precautions  - Avoid agents that lower seizure threshold  - Post-operative imaging, management per NSGY    Plan of care discussed with [facility] team, will continue to follow peripherally. Please call with any questions.

## 2020-11-11 NOTE — SUBJECTIVE & OBJECTIVE
Interval history: NAEON. POD1, no complications. Pt complains of HA that seems to be incisional. Subgaleal drain in place, planned for removal today. Continue SBP <130 per NSGY. Continue frequent neuro checks. CTH in AM for f/u.     Review of Systems: Review of Systems   Constitutional: Negative for chills, fever and weight loss.   HENT: Negative for congestion, nosebleeds and sinus pain.    Eyes: Negative for blurred vision, double vision and photophobia.   Respiratory: Negative for cough, sputum production and shortness of breath.    Cardiovascular: Negative for chest pain, palpitations and leg swelling.   Gastrointestinal: Positive for nausea and vomiting. Negative for heartburn.   Genitourinary: Negative for dysuria, frequency and urgency.   Musculoskeletal: Negative for back pain, joint pain and neck pain.   Skin: Negative for itching and rash.   Neurological: Positive for headaches. Negative for dizziness, sensory change and speech change.     Vitals:   Temp: 98.7 °F (37.1 °C)  Pulse: 60  Rhythm: normal sinus rhythm  BP: (!) 99/57  MAP (mmHg): 76  Resp: 20  SpO2: (!) 93 %  O2 Device (Oxygen Therapy): room air    Temp  Min: 97.7 °F (36.5 °C)  Max: 98.7 °F (37.1 °C)  Pulse  Min: 57  Max: 71  BP  Min: 90/52  Max: 113/58  MAP (mmHg)  Min: 66  Max: 81  Resp  Min: 7  Max: 30  SpO2  Min: 93 %  Max: 99 %    11/09 0701 - 11/10 0700  In: 3150 [P.O.:650; I.V.:2500]  Out: 3785 [Urine:3525; Drains:260]   Unmeasured Output  Stool Occurrence: 0   Examination:   Constitutional: Well-nourished and -developed. No apparent distress.   Eyes: Conjunctiva clear, anicteric. Lids no lesions.  Head/Ears/Nose/Mouth/Throat/Neck: Surgical incision to R scalp. Moist mucous membranes. External ears, nose atraumatic.   Cardiovascular: Regular rhythm. No leg edema.  Respiratory: Comfortable respirations. Clear to auscultation.  Gastrointestinal: Soft, nondistended, nontender. + bowel sounds.    Neurologic:   -GCS E3V5M6  -Drowsy. Oriented to  person, place, and time. Speech fluent. Follows commands. Recent and remote memory adequate. Judgment good. Insight appropriate.  -Cranial nerves: EOM intact, PERRL 4mm, no facial droop, +cough  -Strength: Moves all extremities spontaneously, antigravity  -Sensation: Intact to light touch.    Today I independently reviewed pertinent medications, lines/drains/airways, imaging, cardiology results, laboratory results,

## 2020-11-11 NOTE — SUBJECTIVE & OBJECTIVE
Interval History: NAEON. HV with 30cc overnight, 120 total. Clearing. Dressing c/d/i, will remove today. No seizure like activity.     Medications:  Continuous Infusions:   sodium chloride 0.9% 75 mL/hr at 11/11/20 0601     Scheduled Meds:   ceFAZolin (ANCEF) IVPB  2 g Intravenous Q8H    cloBAZam  40 mg Oral BID    heparin (porcine)  5,000 Units Subcutaneous Q8H    lamoTRIgine  150 mg Oral QHS    lamoTRIgine  300 mg Oral Daily    mupirocin   Nasal BID    nicotine  1 patch Transdermal Daily    sertraline  100 mg Oral QHS     PRN Meds:acetaminophen, HYDROcodone-acetaminophen, HYDROmorphone, labetalol, ondansetron     Review of Systems  Objective:     Weight: 61.5 kg (135 lb 9.3 oz)  Body mass index is 22.56 kg/m².  Vital Signs (Most Recent):  Temp: 98.7 °F (37.1 °C) (11/11/20 0303)  Pulse: (!) 59 (11/11/20 0601)  Resp: 14 (11/11/20 0601)  BP: 97/61 (11/11/20 0601)  SpO2: (!) 94 % (11/11/20 0601) Vital Signs (24h Range):  Temp:  [98.4 °F (36.9 °C)-98.8 °F (37.1 °C)] 98.7 °F (37.1 °C)  Pulse:  [55-71] 59  Resp:  [7-32] 14  SpO2:  [92 %-99 %] 94 %  BP: ()/(53-65) 97/61  Arterial Line BP: ()/(52-56) 100/54                Resp Rate Total:  [20 br/min] 20 br/min         Closed/Suction Drain 11/09/20 1104 Right Other (Comment) Accordion 10 Fr. (Active)   Site Description Unable to view 11/10/20 1500   Dressing Type Gauze 11/10/20 1500   Dressing Status Clean;Intact;Dry 11/10/20 1500   Dressing Intervention Integrity maintained 11/10/20 1500   Drainage Bloody 11/10/20 1500   Status To bulb suction 11/10/20 1500   Output (mL) 30 mL 11/11/20 0501       Neurosurgery Physical Exam   GCS E4V5M6     Pupils: Equal and Reactive.   Extraocular Movements: Conjugate, normal movement.        Motor Exam:  Left Upper Extremity:Follows Commands.  Right Upper Extremity: Follows Commands.  Left Lower Extremity: Follows Commands.  Right Lower Extremity: Follows Commands.     Dressing c/d/i. HV with serosanguinous  output    Significant Labs:  Recent Labs   Lab 11/09/20  1154 11/10/20  0116 11/11/20  0414   * 92 82    140 140   K 4.2 3.7 3.5   * 109 106   CO2 24 26 27   BUN 4* 3* 2*   CREATININE 0.7 0.7 0.6   CALCIUM 7.7* 8.1* 7.9*   MG  --  2.1 1.7     Recent Labs   Lab 11/09/20  1154 11/10/20  0116 11/11/20 0414   WBC 4.34 6.12 5.86   HGB 12.9 11.8* 11.4*   HCT 37.5 35.3* 34.4*    196 174         Significant Diagnostics:  No results found in the last 24 hours.

## 2020-11-11 NOTE — HOSPITAL COURSE
11/9: Presents with cavernoma of the right temporal lobe. Underwent resection of the cavernoma.   11/10: POD1. NAEO subgaleal drain removed today  11/11: NAEO. Step down to NSGY today. Pt/Ot to evaluate and treat. Incision care and activity recommendations reviewed. Plan of care discussed with patient and father at bedside and they voiced understanding. Their questions were all answered. Drain d/c'd today.   11/12: NAEON. Denies headache, vision changes, or weakness. Incision intact. K 3.2 this AM, replaced with 40mEq. Hypotension this AM, patient asymptomatic denies dizziness or lightheadedness. 500 cc bolus NS, stable for discharge home following. Discharge instructions reviewed with patient and family. They voiced understanding. All of their questions were answered.

## 2020-11-11 NOTE — PT/OT/SLP EVAL
Physical Therapy Evaluation    Patient Name:  Mary Haywood   MRN:  [id number]  Admit Date: 11/9/2020  Admitting Diagnosis:  Cavernoma  Length of Stay: 2 days  Recent Surgery: Procedure(s) (LRB):  CRANIOTOMY, USING FRAMELESS STEREOTAXY FOR  RIGHT SIDED RESECTION OF CAVERNOMA (Right) 2 Days Post-Op    Recommendations:   Discharge Recommendations:  home   Discharge Equipment Recommendations: none   Barriers to discharge: Decreased caregiver support  Plan:   During this hospitalization, patient to be seen 3 x/week to address the above listed problems via gait training, therapeutic activities, therapeutic exercises, neuromuscular re-education  · Plan of Care Expires:  12/10/20   Plan of Care Reviewed with: patient, spouse  Assessment:     Mary Haywood is a 35 y.o. female admitted with a medical diagnosis of Cavernoma.  She presents with the following impairments/functional limitations: decreased functional mobility and balance. Pt with swelling of R eye and right side of head. Pt with good supportive family as needed. Pt c/o nausea and fatigue throughout session, requires minimal assist for balance. Mary Haywood's deficits effect their ability to safely and independently participate in self-care tasks and functional mobility which increases their caregiver burden. Due to her physical therapy diagnosis of debility and deconditioning, they continue to benefit from acute PT services to address the following limitations: high fall risk, weakness, instability, and the need for supervised instruction of exercise. These deficits effect their roles and responsibilities in which they were able to complete prior to admit. Pt would benefit from an intensive and collaborative PT/OT/SLP therapy program to improve quality of life and focus on recovery of impairments.     Problem List: impaired self care skills, impaired functional mobilty, edema, pain, impaired balance  Rehab Prognosis: Good; patient would  "benefit from acute skilled PT services to address these deficits and reach maximum level of function.      GOALS:   Multidisciplinary Problems     Physical Therapy Goals        Problem: Physical Therapy Goal    Goal Priority Disciplines Outcome Goal Variances Interventions   Physical Therapy Goal     PT, PT/OT Ongoing, Progressing     Description: Goals to be met by: 2020    Patient will increase functional independence with mobility by performin. Supine <> sit with Supervision.  2. Sit <> stand transfer with Supervision using No Assistive Device.  3. Bed <> chair transfer via Stand Pivot with Supervision using No Assistive Device.  4. Gait  x 150 feet with Supervision using No Assistive Device to prepare for community ambulation and endurance activities.                       Subjective     Communicated with RN prior to session.  Patient found supine upon PT entry to room, agreeable to evaluation. Mary Haywood's father present during session.    Chief Complaint: No chief complaint on file.    Patient/Family Comments/goals: "I haven't gotten any sleep. I think the medicine is making me nauseous."  Pain/Comfort:  · Pain Rating 1: 0/10(pt c/o nausea and fatigue)  · Pain Rating Post-Intervention 1: 0/10    Social History:  Residence: lives with her 17yr old son. 1-story house  Support available: family  Equipment Used: None  Equipment Owned (not using): None  Prior level of function: independent  Hand Dominance: right.   Work: Disability.   Drive: no.   Managing Medicines/Managing Home: yes.   Hobbies: none stated    Objective:   Patient found with: telemetry     General Precautions: Standard, Cardiac fall   Orthopedic Precautions:N/A   Braces: N/A   Oxygen Device: Room Air  Vitals: BP (!) 108/54 (BP Location: Left arm, Patient Position: Lying)   Pulse 65   Temp 97.9 °F (36.6 °C) (Oral)   Resp 18   Ht 5' 5" (1.651 m)   Wt 61.5 kg (135 lb 9.3 oz)   LMP 2012 Comment: pt had hyst in   " SpO2 96%   Breastfeeding No   BMI 22.56 kg/m²     Exams:  · Cognition:   · Alert and Cooperative  · AxOx4  · Command following: Follows multistep verbal commands  · Fluency: clear/fluent  · Hearing: Intact  · Vision:  Intact visual fields, edema noted around right eye     Left LE Right LE   Edema absent absent   ROM AROM WFL AROM WFL   Modified Waqas Scale 0: No increase in muscle tone 0: No increase in muscle tone   Strength 5/5 5/5   Sensation intact to light touch intact to light touch   Coordination normal normal     Outcome Measures:  AM-PAC 6 CLICK MOBILITY  Turning over in bed (including adjusting bedclothes, sheets and blankets)?: 4  Sitting down on and standing up from a chair with arms (e.g., wheelchair, bedside commode, etc.): 4  Moving from lying on back to sitting on the side of the bed?: 4  Moving to and from a bed to a chair (including a wheelchair)?: 3  Need to walk in hospital room?: 3  Climbing 3-5 steps with a railing?: 3  Basic Mobility Total Score: 21     Functional Mobility:  Additional staff present: N/A  Bed Mobility:  · Supine to Sit: stand by assistance; HOB flat and from left side of bed  · Scooting anteriorly to EOB to have both feet planted on floor: stand by assistance  · Sit to Supine: stand by assistance; HOB flat and to left side of bed    Transfers:   · Sit <> Stand Transfer: contact guard assistance with hand-held assist   · Stand <> Sit Transfer: contact guard assistance with hand-held assist   · [id number] from EOB      Gait:   · Patient ambulated: 150ft   · Patient required: minimal assist  · Patient used: hand-held assist  · Gait Pattern observed: reciprocal gait  · Gait Deviation(s): decreased dion  · Impairments due to: impaired balance  · Comments: pt with mild instability, no LOB noted. Anticipate with continued practice and resolution of pain and nausea, pt will resume independent ambulation.    Therapeutic Activities & Exercises:   Education:   Patient provided  with daily orientation and goals of this PT session.   Patient educated to transfer to bedside chair/bedside commode/bathroom with RN/PCT present.    Patient educated on Fall risk, home safety, Home exercise program and plan of care by explanation.    Patient and Family member Verbalized Understanding.   Time provided for therapeutic counseling and discussion of current health disposition. All questions answered to satisfaction, within scope of PT practice; voiced no other concerns. White board updated in patient's room, RN notified of session.    Patient left supine, with head in midline, neutral pelvis & heels floated for skin protection with all lines intact, call button in reach and RN notified.    History:     Past Medical History:   Diagnosis Date    ADHD (attention deficit hyperactivity disorder)     Anxiety and depression 7/5/2019    Complex partial epilepsy with generalization and with intractable epilepsy 2012         HSV-1 infection     HSV-2 infection     Neuromuscular disorder     Overdose of illicit drug     PTSD (post-traumatic stress disorder)        Past Surgical History:   Procedure Laterality Date    CRANIOTOMY USING FRAMELESS STEREOTAXY Right 11/9/2020    Procedure: CRANIOTOMY, USING FRAMELESS STEREOTAXY FOR  RIGHT SIDED RESECTION OF CAVERNOMA;  Surgeon: Nasra Wetzel MD;  Location: [facility] OR [address] [place] [address];  Service: Neurosurgery;  Laterality: Right;  TORONTO II, ASA II, BLOOD TYPE & CROSS 2 UNITS, HEADREST GRAYSON, REGULAR BED, SUPINE    HYSTERECTOMY  2011    endometriosis    OOPHORECTOMY  2011    ovarian cyst    wisdom teeth removal         Family History   Adopted: Yes   Family history unknown: Yes       Social History     Socioeconomic History    Marital status: Single     Spouse name: Not on file    Number of children: Not on file    Years of education: Not on file    Highest education level: Not on file   Occupational History    Occupation: disability   Social Needs     Financial resource strain: Not on file    Food insecurity     Worry: Not on file     Inability: Not on file    Transportation needs     Medical: Not on file     Non-medical: Not on file   Tobacco Use    Smoking status: Current Every Day Smoker     Packs/day: 1.00     Types: Cigarettes     Start date: 7/29/1998    Smokeless tobacco: Never Used   Substance and Sexual Activity    Alcohol use: Yes     Frequency: Monthly or less     Drinks per session: 3 or 4     Comment: once a month    Drug use: Yes     Types: Marijuana    Sexual activity: Not Currently     Partners: Male     Birth control/protection: See Surgical Hx, Surgical     Comment:    Lifestyle    Physical activity     Days per week: Not on file     Minutes per session: Not on file    Stress: To some extent   Relationships    Social connections     Talks on phone: Not on file     Gets together: Not on file     Attends Christian service: Not on file     Active member of club or organization: Not on file     Attends meetings of clubs or organizations: Not on file     Relationship status: Not on file   Other Topics Concern    Not on file   Social History Narrative    Live alone     Time Tracking:     PT Received On: 11/11/20  PT Start Time: 1421     PT Stop Time: 1431  PT Total Time (min): 10 min     Billable Minutes: Evaluation 10    Maria Luz Vang PT, DPT  11/11/2020  Pager: [phone]

## 2020-11-11 NOTE — SUBJECTIVE & OBJECTIVE
Interval History:  NAEO. Patient remains afebrile and HD stable. Drain out. PT/OT to evaluate and treat. Patient to step down today.     Review of Systems   Constitutional: Negative for chills, diaphoresis, fatigue, fever and unexpected weight change.   HENT: Negative for congestion, rhinorrhea and trouble swallowing.    Eyes: Negative for pain and visual disturbance.   Respiratory: Negative for cough, chest tightness and shortness of breath.    Cardiovascular: Negative for chest pain and leg swelling.   Gastrointestinal: Negative for abdominal pain, diarrhea, nausea and vomiting.   Genitourinary: Negative for difficulty urinating and urgency.   Musculoskeletal: Negative for arthralgias and myalgias.   Skin: Negative for color change.   Neurological: Positive for weakness and headaches. Negative for dizziness and syncope.       Objective:     Vitals:  Temp: 98.8 °F (37.1 °C)  Pulse: 61  Rhythm: sinus bradycardia  BP: (!) 93/52  MAP (mmHg): 67  Resp: 14  SpO2: (!) 92 %  O2 Device (Oxygen Therapy): room air    Temp  Min: 98.4 °F (36.9 °C)  Max: 98.8 °F (37.1 °C)  Pulse  Min: 53  Max: 71  BP  Min: 91/55  Max: 113/58  MAP (mmHg)  Min: 67  Max: 81  Resp  Min: 11  Max: 32  SpO2  Min: 92 %  Max: 97 %    11/10 0701 - 11/11 0700  In: 1556.3 [I.V.:1556.3]  Out: 1570 [Urine:1450; Drains:120]   Unmeasured Output  Urine Occurrence: 1  Stool Occurrence: 0       Physical Exam  Vitals signs and nursing note reviewed.   Constitutional:       Appearance: Normal appearance. She is not ill-appearing, toxic-appearing or diaphoretic.   HENT:      Head:      Comments: Surgical incision to R scalp, drain removed     Nose: Nose normal.   Eyes:      General: No scleral icterus.     Extraocular Movements: Extraocular movements intact.      Pupils: Pupils are equal, round, and reactive to light.   Neck:      Musculoskeletal: Normal range of motion and neck supple.   Pulmonary:      Effort: Pulmonary effort is normal. No respiratory distress.    Abdominal:      General: There is no distension.      Palpations: Abdomen is soft.   Musculoskeletal:         General: No deformity or signs of injury.   Skin:     General: Skin is warm and dry.   Neurological:      Mental Status: She is lethargic.   Psychiatric:         Mood and Affect: Mood normal.         Behavior: Behavior normal.         Medications:  Continuous ScheduledceFAZolin (ANCEF) IVPB, 2 g, Q8H  cloBAZam, 40 mg, BID  heparin (porcine), 5,000 Units, Q8H  lamoTRIgine, 150 mg, QHS  lamoTRIgine, 300 mg, Daily  mupirocin, , BID  nicotine, 1 patch, Daily  senna-docusate 8.6-50 mg, 1 tablet, BID  sertraline, 100 mg, QHS    PRNacetaminophen, 650 mg, Q6H PRN  HYDROcodone-acetaminophen, 1 tablet, Q4H PRN  HYDROmorphone, 0.2 mg, Q4H PRN  labetalol, 10 mg, Q4H PRN  ondansetron, 4 mg, Q6H PRN      Today I personally reviewed pertinent medications, lines/drains/airways, imaging, laboratory results, notably:    Diet  Diet Adult Regular (IDDSI Level 7)

## 2020-11-11 NOTE — SUBJECTIVE & OBJECTIVE
Interval History: NAEON. Denies headache, vision changes, or weakness. Incision intact. K 3.2 this AM, replaced with 40mEq. Hypotension this AM, patient asymptomatic denies dizziness or lightheadedness. 500 cc bolus NS, stable for discharge home following. Discharge instructions reviewed with patient and family. They voiced understanding. All of their questions were answered.     Medications:  Continuous Infusions:   sodium chloride 0.9% 75 mL/hr at 11/11/20 0902     Scheduled Meds:   ceFAZolin (ANCEF) IVPB  2 g Intravenous Q8H    cloBAZam  40 mg Oral BID    heparin (porcine)  5,000 Units Subcutaneous Q8H    lamoTRIgine  150 mg Oral QHS    lamoTRIgine  300 mg Oral Daily    mupirocin   Nasal BID    nicotine  1 patch Transdermal Daily    sertraline  100 mg Oral QHS     PRN Meds:acetaminophen, HYDROcodone-acetaminophen, HYDROmorphone, labetalol, ondansetron     Review of Systems  Objective:     Weight: 61.5 kg (135 lb 9.3 oz)  Body mass index is 22.56 kg/m².  Vital Signs (Most Recent):  Temp: 98.8 °F (37.1 °C) (11/11/20 0702)  Pulse: 67 (11/11/20 0902)  Resp: 12 (11/11/20 0902)  BP: 100/60 (11/11/20 0902)  SpO2: 95 % (11/11/20 0902) Vital Signs (24h Range):  Temp:  [98.4 °F (36.9 °C)-98.8 °F (37.1 °C)] 98.8 °F (37.1 °C)  Pulse:  [53-71] 67  Resp:  [11-32] 12  SpO2:  [92 %-97 %] 95 %  BP: ()/(53-65) 100/60  Arterial Line BP: (100)/(54) 100/54     Date 11/11/20 0700 - 11/12/20 0659   Shift 4564-4256 9992-6566 7252-9274 24 Hour Total   INTAKE   I.V.(mL/kg) 226.3(3.7)   226.3(3.7)   Shift Total(mL/kg) 226.3(3.7)   226.3(3.7)   OUTPUT   Urine(mL/kg/hr) 500   500   Shift Total(mL/kg) 500(8.1)   500(8.1)   Weight (kg) 61.5 61.5 61.5 61.5              Resp Rate Total:  [20 br/min] 20 br/min         Closed/Suction Drain 11/09/20 1104 Right Other (Comment) Accordion 10 Fr. (Active)   Site Description Unable to view 11/11/20 0702   Dressing Type Gauze 11/11/20 0702   Dressing Status Clean;Dry;Intact 11/11/20 0702    Dressing Intervention Integrity maintained 11/11/20 0702   Drainage Bloody 11/11/20 0702   Status Other (Comment) 11/11/20 0702   Output (mL) 30 mL 11/11/20 0501          Physical Exam    General: well developed, well nourished, no distress.   Head: normocephalic, right forehead/periorbital edema.   Neurologic: Alert and oriented. Thought content appropriate.  GCS: Motor: 6/Verbal: 5/Eyes: 4 GCS Total: 15  Mental Status: Awake, Alert, Oriented x 4  Language: No aphasia  Speech: No dysarthria  Cranial nerves: face symmetric, tongue midline, CN II-XII grossly intact.   Eyes: pupils equal, round, reactive to light with accommodation, EOMI.   Pulmonary: normal respirations, no signs of respiratory distress  Abdomen: soft, non-distended, not tender to palpation  Skin: Skin is warm, dry and intact.  Sensory: intact to light touch throughout    Motor Strength:Moves all extremities spontaneously with good tone.  Full strength upper and lower extremities. No abnormal movements seen.     Reflexes:   Babinski's: Negative.  Clonus: Negative.     Cerebellar:   Finger-to-nose: left dysmetria present.   Pronator drift: absent bilaterally    Cranial incision: Clean, dry, sutures intact. Skin edges well approximated. No surrounding erythema or edema. No drainage or TTP.       Significant Labs:  Recent Labs   Lab 11/09/20  1154 11/10/20  0116 11/11/20  0414   * 92 82    140 140   K 4.2 3.7 3.5   * 109 106   CO2 24 26 27   BUN 4* 3* 2*   CREATININE 0.7 0.7 0.6   CALCIUM 7.7* 8.1* 7.9*   MG  --  2.1 1.7     Recent Labs   Lab 11/09/20  1154 11/10/20  0116 11/11/20  0414   WBC 4.34 6.12 5.86   HGB 12.9 11.8* 11.4*   HCT 37.5 35.3* 34.4*    196 174     No results for input(s): LABPT, INR, APTT in the last 48 hours.  Microbiology Results (last 7 days)     ** No results found for the last 168 hours. **        All pertinent labs from the last 24 hours have been reviewed.    Significant Diagnostics:  I have  reviewed and interpreted all pertinent imaging results/findings within the past 24 hours.

## 2020-11-11 NOTE — PLAN OF CARE
[facility] Care Plan    POC reviewed with Mary Haywood at 0300. Pt verbalized understanding. Questions and concerns addressed. No acute events overnight. Pt c/o incisional pain through the night, nausea relieved with Zofran. Pt progressing toward goals. Will continue to monitor. See below and flowsheets for full assessment and VS info.       Neuro:  Holt Coma Scale  Best Eye Response: 4-->(E4) spontaneous  Best Motor Response: 6-->(M6) obeys commands  Best Verbal Response: 5-->(V5) oriented  Holt Coma Scale Score: 15  Assessment Qualifiers: patient not sedated/intubated        24hr Temp:  [98.4 °F (36.9 °C)-98.8 °F (37.1 °C)]     CV:   Rhythm: normal sinus rhythm  BP goals:   SBP <  130  MAP > 65    Resp:   O2 Device (Oxygen Therapy): room air       Plan: N/A    GI/:  HUNTER Total Score: 20  Diet/Nutrition Received: regular  Last Bowel Movement: 11/08/20  Voiding Characteristics: hesitancy    Intake/Output Summary (Last 24 hours) at 11/11/2020 0627  Last data filed at 11/11/2020 0601  Gross per 24 hour   Intake 1556.25 ml   Output 1570 ml   Net -13.75 ml     Unmeasured Output  Urine Occurrence: 1  Stool Occurrence: 0    Labs/Accuchecks:  Recent Labs   Lab 11/11/20  0414   WBC 5.86   RBC 3.32*   HGB 11.4*   HCT 34.4*         Recent Labs   Lab 11/11/20  0414      K 3.5   CO2 27      BUN 2*   CREATININE 0.6   ALKPHOS 70   ALT 5*   AST 11   BILITOT 0.3      Recent Labs   Lab 11/09/20  0542   INR 0.9   APTT 33.0*    No results for input(s): CPK, CPKMB, TROPONINI, MB in the last 168 hours.    Electrolytes: No replacement orders  Accuchecks: none    Gtts:   sodium chloride 0.9% 75 mL/hr at 11/11/20 0601       LDA/Wounds:  Lines/Drains/Airways       Drain                   Closed/Suction Drain 11/09/20 1104 Right Other (Comment) Accordion 10 Fr. 1 day              Peripheral Intravenous Line                   Peripheral IV - Single Lumen 11/09/20 0542 18 G Anterior;Distal;Left Wrist 2 days          Peripheral IV - Single Lumen 11/09/20 1100 16 G Left;Posterior Hand 1 day                  Wounds: No  Wound care consulted: No

## 2020-11-11 NOTE — DISCHARGE INSTRUCTIONS
Neurosurgery Patient Information      -No driving until cleared by your epilepsy neurologist.   -Do not take any OTC products containing acetaminophen at the same time as you take your narcotic pain medication. Medications that may contain acetaminophen include but are not limited to: Excedrin and other headache medications, arthritis medications, cold and sinus medications, etc. Please review the list of active ingredients in any OTC medication prior to taking it.  -Do not take any Aspirin or Aspirin-containing products for 2 weeks after surgery.  -Do not take any Aleve, Naprosyn, Naproxen, Ibuprofen, Advil or any other nonsteroidal anti-inflammatory drug (NSAID) for 2 weeks after surgery.  -Do not take any herbal supplements for 2 weeks after surgery.   -Do not consume any alcoholic beverages until released by your neurosurgeon  -Do not perform any excessive bending over or leaning forward as this is a fall hazard.  -Do not lift anything heavier than a gallon of milk until cleared in post-operative visit.     Contact the Neurosurgery Office immediately if:  If you begin to notice any neurologic changes such as:           -Sudden onset of lethargy or sleepiness           -Sudden confusion, trouble speaking, or understanding            -Sudden trouble seeing in one or both eyes            -Sudden trouble walking, dizziness, loss of coordination            -Sudden severe headache with no known cause            -Sudden onset of numbness or weakness       Wound Care:  Keep your incision open to air. You may shower on the 2nd day after your surgery. Please shower with baby shampoo, but do not take a bath. Keep the incision clean and dry at all times. Please cover the incision with saran wrap or other occlusive dressing while showering and REMOVE once you have completed taking your shower. Do not allow the force of water to hit the incision. If the incision gets damp, gently pat it dry. Do not rub or scrub the incision.  You cannot take a bath/swim/submerge the incision until 8 weeks after surgery.    The incision does not need to be cleaned with any water, soap, alcohol, peroxide, or other substance.    Please apply bacitracin ointment to incisions twice daily. You have dissolvable suture in place. It does not need to be removed.       Miscellaneous:  -Follow up appointments scheduled:  11/23/2020: Wound check with nurse  12/22/2020: Post-op follow up with Dr. Wetzel    If you have any further questions or concerns please contact Neurosurgery Office office at [phone]

## 2020-11-11 NOTE — PROGRESS NOTES
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    Post-Op Info:  Procedure(s) (LRB):  CRANIOTOMY, USING FRAMELESS STEREOTAXY FOR  RIGHT SIDED RESECTION OF CAVERNOMA (Right)   2 Days Post-Op     Interval History: NAEON. HV with 30cc overnight, 120 total. Clearing. Dressing c/d/i, will remove today. No seizure like activity.     Medications:  Continuous Infusions:   sodium chloride 0.9% 75 mL/hr at 11/11/20 0601     Scheduled Meds:   ceFAZolin (ANCEF) IVPB  2 g Intravenous Q8H    cloBAZam  40 mg Oral BID    heparin (porcine)  5,000 Units Subcutaneous Q8H    lamoTRIgine  150 mg Oral QHS    lamoTRIgine  300 mg Oral Daily    mupirocin   Nasal BID    nicotine  1 patch Transdermal Daily    sertraline  100 mg Oral QHS     PRN Meds:acetaminophen, HYDROcodone-acetaminophen, HYDROmorphone, labetalol, ondansetron     Review of Systems  Objective:     Weight: 61.5 kg (135 lb 9.3 oz)  Body mass index is 22.56 kg/m².  Vital Signs (Most Recent):  Temp: 98.7 °F (37.1 °C) (11/11/20 0303)  Pulse: (!) 59 (11/11/20 0601)  Resp: 14 (11/11/20 0601)  BP: 97/61 (11/11/20 0601)  SpO2: (!) 94 % (11/11/20 0601) Vital Signs (24h Range):  Temp:  [98.4 °F (36.9 °C)-98.8 °F (37.1 °C)] 98.7 °F (37.1 °C)  Pulse:  [55-71] 59  Resp:  [7-32] 14  SpO2:  [92 %-99 %] 94 %  BP: ()/(53-65) 97/61  Arterial Line BP: ()/(52-56) 100/54                Resp Rate Total:  [20 br/min] 20 br/min         Closed/Suction Drain 11/09/20 1104 Right Other (Comment) Accordion 10 Fr. (Active)   Site Description Unable to view 11/10/20 1500   Dressing Type Gauze 11/10/20 1500   Dressing Status Clean;Intact;Dry 11/10/20 1500   Dressing Intervention Integrity maintained 11/10/20 1500   Drainage Bloody 11/10/20 1500   Status To bulb suction 11/10/20 1500   Output (mL) 30 mL 11/11/20 0501        Neurosurgery Physical Exam   GCS [id number]     Pupils: Equal and Reactive.   Extraocular Movements: Conjugate, normal movement.        Motor Exam:  Left Upper Extremity:Follows Commands.  Right Upper Extremity: Follows Commands.  Left Lower Extremity: Follows Commands.  Right Lower Extremity: Follows Commands.     Dressing c/d/i. HV with serosanguinous output    Significant Labs:  Recent Labs   Lab 11/09/20  1154 11/10/20  0116 11/11/20  0414   * 92 82    140 140   K 4.2 3.7 3.5   * 109 106   CO2 24 26 27   BUN 4* 3* 2*   CREATININE 0.7 0.7 0.6   CALCIUM 7.7* 8.1* 7.9*   MG  --  2.1 1.7     Recent Labs   Lab 11/09/20  1154 11/10/20  0116 11/11/20  0414   WBC 4.34 6.12 5.86   HGB 12.9 11.8* 11.4*   HCT 37.5 35.3* 34.4*    196 174         Significant Diagnostics:  No results found in the last 24 hours.      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      --Continue care per primary team.  --Continue subgaleal hemovac drain to full suction, will consider removal today  --Continue prophylactic antibiotics while surgical drain in place.  --Continue blood pressure parameters, SBP < 140.  --We will continue to monitor closely, please contact us with any questions or concerns.   --Anticipate stepdown this AM, will d/w staff          Jessica Prince MD  Neurosurgery  [facility]

## 2020-11-11 NOTE — SUBJECTIVE & OBJECTIVE
Interval History: No acute events overnight, resting throughout the day. Pending stepdown to neurosurgery service.    Current Facility-Administered Medications   Medication Dose Route Frequency Provider Last Rate Last Dose    acetaminophen tablet 650 mg  650 mg Oral Q6H PRN Marisela Shore NP   650 mg at 11/11/20 0849    ceFAZolin injection 2 g  2 g Intravenous Q8H Lee Wilkes MD   2 g at 11/11/20 0415    cloBAZam Tab 40 mg  40 mg Oral BID Lee Wilkes MD   40 mg at 11/11/20 0850    heparin (porcine) injection 5,000 Units  5,000 Units Subcutaneous Q8H Lee Wilkes MD   5,000 Units at 11/11/20 0616    HYDROcodone-acetaminophen 5-325 mg per tablet 1 tablet  1 tablet Oral Q4H PRN Lee Wilkes MD   1 tablet at 11/11/20 0850    HYDROmorphone injection 0.2 mg  0.2 mg Intravenous Q4H PRN Lee Wilkes MD   0.2 mg at 11/10/20 1822    labetalol 20 mg/4 mL (5 mg/mL) IV syring  10 mg Intravenous Q4H PRN Arti Barbosa PA-C        lamoTRIgine tablet 150 mg  150 mg Oral QHS Marisela Shore NP   150 mg at 11/10/20 2159    lamoTRIgine tablet 300 mg  300 mg Oral Daily Marisela Shore NP   300 mg at 11/11/20 0850    mupirocin 2 % ointment   Nasal BID Lee Wilkes MD        nicotine 14 mg/24 hr 1 patch  1 patch Transdermal Daily Marisela Shore NP   1 patch at 11/10/20 0822    ondansetron injection 4 mg  4 mg Intravenous Q6H PRN Nasra Wetzel MD   4 mg at 11/10/20 2235    potassium chloride packet 40 mEq  40 mEq Oral Once Adriel Acosta MD        senna-docusate 8.6-50 mg per tablet 1 tablet  1 tablet Oral BID Adriel Acosta MD        sertraline tablet 100 mg  100 mg Oral QHS Lee Wilkes MD   100 mg at 11/10/20 2158     Continuous Infusions:    Review of Systems   Constitutional: Positive for fatigue. Negative for fever.   Eyes: Negative for photophobia and visual disturbance.   Respiratory: Negative for cough and shortness of breath.    Cardiovascular: Negative for  chest pain and leg swelling.   Gastrointestinal: Negative for nausea and vomiting.   Skin: Negative for pallor and rash.   Neurological: Positive for headaches. Negative for facial asymmetry, speech difficulty and weakness.   Psychiatric/Behavioral: Negative for agitation and confusion.     Objective:     Vital Signs (Most Recent):  Temp: 98.3 °F (36.8 °C) (11/11/20 1100)  Pulse: 61 (11/11/20 1002)  Resp: 14 (11/11/20 1002)  BP: (!) 102/57 (11/11/20 1100)  SpO2: (!) 92 % (11/11/20 1002) Vital Signs (24h Range):  Temp:  [98.3 °F (36.8 °C)-98.8 °F (37.1 °C)] 98.3 °F (36.8 °C)  Pulse:  [53-67] 61  Resp:  [11-32] 14  SpO2:  [92 %-97 %] 92 %  BP: ()/(52-65) 102/57     Weight: 61.5 kg (135 lb 9.3 oz)  Body mass index is 22.56 kg/m².    Physical Exam  Vitals signs and nursing note reviewed.   Constitutional:       Appearance: Normal appearance. She is not ill-appearing, toxic-appearing or diaphoretic.   HENT:      Head:      Comments: Surgical incision to R scalp, drain in place     Nose: Nose normal.   Eyes:      General: No scleral icterus.     Extraocular Movements: Extraocular movements intact.      Pupils: Pupils are equal, round, and reactive to light.   Neck:      Musculoskeletal: Normal range of motion and neck supple.   Pulmonary:      Effort: Pulmonary effort is normal. No respiratory distress.   Abdominal:      General: There is no distension.      Palpations: Abdomen is soft.   Musculoskeletal:         General: No deformity or signs of injury.   Skin:     General: Skin is warm and dry.   Neurological:      Mental Status: She is lethargic.      Deep Tendon Reflexes: Strength normal.   Psychiatric:         Mood and Affect: Mood normal.         Behavior: Behavior normal.         NEUROLOGICAL EXAMINATION:     MENTAL STATUS        Arouses easily to voice, oriented and participates in conversation appropriately     CRANIAL NERVES     CN III, IV, VI   Pupils are equal, round, and reactive to light.    CN VII    Facial expression full, symmetric.     CN VIII   Hearing: intact    CN IX, X   CN IX normal.   CN X normal.     CN XI   CN XI normal.     MOTOR EXAM   Muscle bulk: normal  Overall muscle tone: normal    Strength   Strength 5/5 throughout.       Significant Labs: All pertinent lab results from the past 24 hours have been reviewed.    Significant Studies: I have reviewed all pertinent imaging results/findings within the past 24 hours.

## 2020-11-11 NOTE — ASSESSMENT & PLAN NOTE
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    --Neuro stable  --All pertinent labs and diagnostics personally reviewed.  --Post op CTH 11/9: Expected post-op changes. No new edema or hemorrhage.   --Drain discontinued without complication. Patient tolerated well.  --Continue blood pressure parameters, SBP < 140.  --Epilepsy: Continue home AEDs  --Hypokalemia: 3.2, replaced orally with 40 mEq K.   --Hypotension: asymptomatic, 500 cc NS bolus prior to discharge.   --Hypocalcemia: Ca 7.9, Corrected Ca 8.6  --PPx: SCD, SQH, IS, PPI, BR  --Activity: PT/OT/OOB. Recs for home.   --Follow up scheduled:  11/23/2020: Wound check with nurse  12/22/2020: Post-op follow up with Dr. Wetzel    Dispo: Medically stable for discharge to home. Incision care and activity recommendations reviewed. Plan of care discussed with patient and father and they voiced understanding. Her questions were all answered. Follow-up in Neurosurgery clinic arranged.     Discussed with Dr. Wetzel

## 2020-11-12 VITALS
OXYGEN SATURATION: 95 % | HEART RATE: 61 BPM | SYSTOLIC BLOOD PRESSURE: 98 MMHG | WEIGHT: 135.56 LBS | RESPIRATION RATE: 17 BRPM | BODY MASS INDEX: 22.59 KG/M2 | HEIGHT: 65 IN | TEMPERATURE: 98 F | DIASTOLIC BLOOD PRESSURE: 56 MMHG

## 2020-11-12 LAB
ALBUMIN SERPL BCP-MCNC: 3.2 G/DL (ref 3.5–5.2)
ALP SERPL-CCNC: 72 U/L (ref 55–135)
ALT SERPL W/O P-5'-P-CCNC: <5 U/L (ref 10–44)
ANION GAP SERPL CALC-SCNC: 8 MMOL/L (ref 8–16)
AST SERPL-CCNC: 10 U/L (ref 10–40)
BASOPHILS # BLD AUTO: 0.02 K/UL (ref 0–0.2)
BASOPHILS NFR BLD: 0.4 % (ref 0–1.9)
BILIRUB SERPL-MCNC: 0.3 MG/DL (ref 0.1–1)
BUN SERPL-MCNC: 6 MG/DL (ref 6–20)
CALCIUM SERPL-MCNC: 8.2 MG/DL (ref 8.7–10.5)
CHLORIDE SERPL-SCNC: 103 MMOL/L (ref 95–110)
CO2 SERPL-SCNC: 29 MMOL/L (ref 23–29)
CREAT SERPL-MCNC: 0.6 MG/DL (ref 0.5–1.4)
DIFFERENTIAL METHOD: ABNORMAL
EOSINOPHIL # BLD AUTO: 0.1 K/UL (ref 0–0.5)
EOSINOPHIL NFR BLD: 2.3 % (ref 0–8)
ERYTHROCYTE [DISTWIDTH] IN BLOOD BY AUTOMATED COUNT: 13.2 % (ref 11.5–14.5)
EST. GFR  (AFRICAN AMERICAN): >60 ML/MIN/1.73 M^2
EST. GFR  (NON AFRICAN AMERICAN): >60 ML/MIN/1.73 M^2
GLUCOSE SERPL-MCNC: 65 MG/DL (ref 70–110)
HCT VFR BLD AUTO: 34.4 % (ref 37–48.5)
HGB BLD-MCNC: 11.6 G/DL (ref 12–16)
IMM GRANULOCYTES # BLD AUTO: 0.02 K/UL (ref 0–0.04)
IMM GRANULOCYTES NFR BLD AUTO: 0.4 % (ref 0–0.5)
LYMPHOCYTES # BLD AUTO: 1.6 K/UL (ref 1–4.8)
LYMPHOCYTES NFR BLD: 28.6 % (ref 18–48)
MAGNESIUM SERPL-MCNC: 1.7 MG/DL (ref 1.6–2.6)
MCH RBC QN AUTO: 34.7 PG (ref 27–31)
MCHC RBC AUTO-ENTMCNC: 33.7 G/DL (ref 32–36)
MCV RBC AUTO: 103 FL (ref 82–98)
MONOCYTES # BLD AUTO: 0.5 K/UL (ref 0.3–1)
MONOCYTES NFR BLD: 7.9 % (ref 4–15)
NEUTROPHILS # BLD AUTO: 3.4 K/UL (ref 1.8–7.7)
NEUTROPHILS NFR BLD: 60.4 % (ref 38–73)
NRBC BLD-RTO: 0 /100 WBC
PHOSPHATE SERPL-MCNC: 3.2 MG/DL (ref 2.7–4.5)
PLATELET # BLD AUTO: 170 K/UL (ref 150–350)
PMV BLD AUTO: 10.5 FL (ref 9.2–12.9)
POTASSIUM SERPL-SCNC: 3.2 MMOL/L (ref 3.5–5.1)
PROT SERPL-MCNC: 5.5 G/DL (ref 6–8.4)
RBC # BLD AUTO: 3.34 M/UL (ref 4–5.4)
SODIUM SERPL-SCNC: 140 MMOL/L (ref 136–145)
WBC # BLD AUTO: 5.67 K/UL (ref 3.9–12.7)

## 2020-11-12 PROCEDURE — 85025 COMPLETE CBC W/AUTO DIFF WBC: CPT

## 2020-11-12 PROCEDURE — 83735 ASSAY OF MAGNESIUM: CPT

## 2020-11-12 PROCEDURE — 99024 POSTOP FOLLOW-UP VISIT: CPT | Mod: ,,, | Performed by: PHYSICIAN ASSISTANT

## 2020-11-12 PROCEDURE — 25000003 PHARM REV CODE 250: Performed by: PSYCHIATRY & NEUROLOGY

## 2020-11-12 PROCEDURE — 80053 COMPREHEN METABOLIC PANEL: CPT

## 2020-11-12 PROCEDURE — S4991 NICOTINE PATCH NONLEGEND: HCPCS | Performed by: NURSE PRACTITIONER

## 2020-11-12 PROCEDURE — 25000003 PHARM REV CODE 250: Performed by: NURSE PRACTITIONER

## 2020-11-12 PROCEDURE — 36415 COLL VENOUS BLD VENIPUNCTURE: CPT

## 2020-11-12 PROCEDURE — 25000003 PHARM REV CODE 250: Performed by: STUDENT IN AN ORGANIZED HEALTH CARE EDUCATION/TRAINING PROGRAM

## 2020-11-12 PROCEDURE — 99024 PR POST-OP FOLLOW-UP VISIT: ICD-10-PCS | Mod: ,,, | Performed by: PHYSICIAN ASSISTANT

## 2020-11-12 PROCEDURE — 63600175 PHARM REV CODE 636 W HCPCS: Performed by: STUDENT IN AN ORGANIZED HEALTH CARE EDUCATION/TRAINING PROGRAM

## 2020-11-12 PROCEDURE — 84100 ASSAY OF PHOSPHORUS: CPT

## 2020-11-12 RX ORDER — POTASSIUM CHLORIDE 1.5 G/1.58G
40 POWDER, FOR SOLUTION ORAL ONCE
Status: DISCONTINUED | OUTPATIENT
Start: 2020-11-12 | End: 2020-11-12 | Stop reason: HOSPADM

## 2020-11-12 RX ADMIN — LAMOTRIGINE 300 MG: 100 TABLET ORAL at 09:11

## 2020-11-12 RX ADMIN — CLOBAZAM 40 MG: 10 TABLET ORAL at 09:11

## 2020-11-12 RX ADMIN — HEPARIN SODIUM 5000 UNITS: 5000 INJECTION INTRAVENOUS; SUBCUTANEOUS at 05:11

## 2020-11-12 RX ADMIN — NICOTINE 1 PATCH: 14 PATCH TRANSDERMAL at 08:11

## 2020-11-12 RX ADMIN — DOCUSATE SODIUM 50MG AND SENNOSIDES 8.6MG 1 TABLET: 8.6; 5 TABLET, FILM COATED ORAL at 09:11

## 2020-11-12 NOTE — ASSESSMENT & PLAN NOTE
Assessment:  Cognitive Assessment (Localization, etc.):   · Testing showed predominant trouble with processing speed, attention/focus, and nonverbal/visual-spatial skills.   · Traditionally left hemisphere functions for language were broadly normal but traditionally right hemisphere functions were broadly lower and consistent with her imaging findings.   · Recall or memory functions were mostly affected by slow mental speed/trouble with attention/focus. So, she can retain information when adequate focused or when information is repeated. Thus, memory structures are likely intact.   Psychiatric Assessment:   · Longstanding depression/anxiety that is better on SSRI but still has periods of significant symptoms week to week.   Pre/Post-Surgical Considerations:   · Since operative plan is for right hemisphere in a right handed patient, less risk for post-surgical decline for language/verbal memory.   · Given her significant psychiatric history, certainly she will need more regular mental health follow-up post-surgically to ensure she lencho well.   · Does have stressful psychosocial environment that can certainly impact adherence to recs after surgery. So, recommend family minimize stressors at home.   · Instructions and explanations should be given to patient and another person rather than relying on the patient to retain information on her own.   · No concerns about capacity.   Plan:  >>Feedback: Will have feedback session to review results, provide education/support, and discuss below plan of care.   >>Cognitive Strategies: Will discuss strategies and provide handouts on attention, processing speed, and memory during feedback.

## 2020-11-12 NOTE — PROGRESS NOTES
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    Post-Op Info:  Procedure(s) (LRB):  CRANIOTOMY, USING FRAMELESS STEREOTAXY FOR  RIGHT SIDED RESECTION OF CAVERNOMA (Right)   3 Days Post-Op     Interval History: NAEON. Denies headache, vision changes, or weakness. Incision intact. K 3.2 this AM, replaced with 40mEq. Hypotension this AM, patient asymptomatic denies dizziness or lightheadedness. 500 cc bolus NS, stable for discharge home following. Discharge instructions reviewed with patient and family. They voiced understanding. All of their questions were answered.     Medications:  Continuous Infusions:   sodium chloride 0.9% 75 mL/hr at 11/11/20 0902     Scheduled Meds:   ceFAZolin (ANCEF) IVPB  2 g Intravenous Q8H    cloBAZam  40 mg Oral BID    heparin (porcine)  5,000 Units Subcutaneous Q8H    lamoTRIgine  150 mg Oral QHS    lamoTRIgine  300 mg Oral Daily    mupirocin   Nasal BID    nicotine  1 patch Transdermal Daily    sertraline  100 mg Oral QHS     PRN Meds:acetaminophen, HYDROcodone-acetaminophen, HYDROmorphone, labetalol, ondansetron     Review of Systems  Objective:     Weight: 61.5 kg (135 lb 9.3 oz)  Body mass index is 22.56 kg/m².  Vital Signs (Most Recent):  Temp: 98.8 °F (37.1 °C) (11/11/20 0702)  Pulse: 67 (11/11/20 0902)  Resp: 12 (11/11/20 0902)  BP: 100/60 (11/11/20 0902)  SpO2: 95 % (11/11/20 0902) Vital Signs (24h Range):  Temp:  [98.4 °F (36.9 °C)-98.8 °F (37.1 °C)] 98.8 °F (37.1 °C)  Pulse:  [53-71] 67  Resp:  [11-32] 12  SpO2:  [92 %-97 %] 95 %  BP: ()/(53-65) 100/60  Arterial Line BP: (100)/(54) 100/54     Date 11/11/20 0700 - 11/12/20 0659   Shift 4742-7147 5387-5492 8644-9777 24 Hour Total   INTAKE   I.V.(mL/kg) 226.3(3.7)   226.3(3.7)   Shift Total(mL/kg) 226.3(3.7)   226.3(3.7)   OUTPUT    Urine(mL/kg/hr) 500   500   Shift Total(mL/kg) 500(8.1)   500(8.1)   Weight (kg) 61.5 61.5 61.5 61.5              Resp Rate Total:  [20 br/min] 20 br/min         Closed/Suction Drain 11/09/20 1104 Right Other (Comment) Accordion 10 Fr. (Active)   Site Description Unable to view 11/11/20 0702   Dressing Type Gauze 11/11/20 0702   Dressing Status Clean;Dry;Intact 11/11/20 0702   Dressing Intervention Integrity maintained 11/11/20 0702   Drainage Bloody 11/11/20 0702   Status Other (Comment) 11/11/20 0702   Output (mL) 30 mL 11/11/20 0501          Physical Exam    General: well developed, well nourished, no distress.   Head: normocephalic, right forehead/periorbital edema.   Neurologic: Alert and oriented. Thought content appropriate.  GCS: Motor: 6/Verbal: 5/Eyes: 4 GCS Total: 15  Mental Status: Awake, Alert, Oriented x 4  Language: No aphasia  Speech: No dysarthria  Cranial nerves: face symmetric, tongue midline, CN II-XII grossly intact.   Eyes: pupils equal, round, reactive to light with accommodation, EOMI.   Pulmonary: normal respirations, no signs of respiratory distress  Abdomen: soft, non-distended, not tender to palpation  Skin: Skin is warm, dry and intact.  Sensory: intact to light touch throughout    Motor Strength:Moves all extremities spontaneously with good tone.  Full strength upper and lower extremities. No abnormal movements seen.     Reflexes:   Babinski's: Negative.  Clonus: Negative.     Cerebellar:   Finger-to-nose: left dysmetria present.   Pronator drift: absent bilaterally    Cranial incision: Clean, dry, sutures intact. Skin edges well approximated. No surrounding erythema or edema. No drainage or TTP.       Significant Labs:  Recent Labs   Lab 11/09/20  1154 11/10/20  0116 11/11/20  0414   * 92 82    140 140   K 4.2 3.7 3.5   * 109 106   CO2 24 26 27   BUN 4* 3* 2*   CREATININE 0.7 0.7 0.6   CALCIUM 7.7* 8.1* 7.9*   MG  --  2.1 1.7     Recent Labs   Lab 11/09/20  1154  11/10/20  0116 11/11/20  0414   WBC 4.34 6.12 5.86   HGB 12.9 11.8* 11.4*   HCT 37.5 35.3* 34.4*    196 174     No results for input(s): LABPT, INR, APTT in the last 48 hours.  Microbiology Results (last 7 days)     ** No results found for the last 168 hours. **        All pertinent labs from the last 24 hours have been reviewed.    Significant Diagnostics:  I have reviewed and interpreted all pertinent imaging results/findings within the past 24 hours.    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    --Neuro stable  --All pertinent labs and diagnostics personally reviewed.  --Post op CTH 11/9: Expected post-op changes. No new edema or hemorrhage.   --Drain discontinued without complication. Patient tolerated well.  --Continue blood pressure parameters, SBP < 140.  --Epilepsy: Continue home AEDs  --Hypokalemia: 3.2, replaced orally with 40 mEq K.   --Hypotension: asymptomatic, 500 cc NS bolus prior to discharge.   --Hypocalcemia: Ca 7.9, Corrected Ca 8.6  --PPx: SCD, SQH, IS, PPI, BR  --Activity: PT/OT/OOB. Recs for home.   --Follow up scheduled:  11/23/2020: Wound check with nurse  12/22/2020: Post-op follow up with Dr. Rashard Kraft: Medically stable for discharge to home. Incision care and activity recommendations reviewed. Plan of care discussed with patient and father and they voiced understanding. Her questions were all answered. Follow-up in Neurosurgery clinic arranged.     Discussed with Dr. Rashard Gonzalez, PA-C  Neurosurgery  [facility]

## 2020-11-12 NOTE — PLAN OF CARE
Patient to be discharged home.  The patient does not have any home needs.  Family to provide transportation home.  Neurosurgery clinic to schedule follow up appointment.    Future Appointments   Date Time Provider Department [place]   11/23/2020 11:40 AM Annemarie Lopez RN [facility]   12/22/2020  1:00 PM aNsra Wetzel MD [facility]       11/12/20 1029   Final Note   Assessment Type Final Discharge Note   Anticipated Discharge Disposition Home   Hospital Follow Up  Appt(s) scheduled?   (Neurosurgery clinic to schedule follow up appointment.)   Discharge plans and expectations educations in teach back method with documentation complete? Yes   Right Care Referral Info   Post Acute Recommendation No Care   Post-Acute Status   Discharge Delays None known at this time

## 2020-11-12 NOTE — ASSESSMENT & PLAN NOTE
-will discuss treatment plan during neuropsych feedback, but regular psychotherapy is important along with her existing SSRI

## 2020-11-12 NOTE — NURSING
Manual BP 90/60 patient is asymptomatic.. Patient states her blood pressure is always low and it's normal for her.  MD notified. WCTM

## 2020-11-12 NOTE — PLAN OF CARE
Patient is Ax0x4. POC reviewed with patient. No acute events during shift. VSS. PRN medication given for pain. Safety/fall precautions maintained. Will continue to monitor.    Problem: Fall Injury Risk  Goal: Absence of Fall and Fall-Related Injury  Outcome: Ongoing, Not Progressing     Problem: Adult Inpatient Plan of Care  Goal: Plan of Care Review  Outcome: Ongoing, Not Progressing

## 2020-11-13 ENCOUNTER — TELEPHONE (OUTPATIENT)
Dept: NEUROSURGERY | Facility: CLINIC | Age: 35
End: 2020-11-13

## 2020-11-13 ENCOUNTER — PATIENT OUTREACH (OUTPATIENT)
Dept: ADMINISTRATIVE | Facility: CLINIC | Age: 35
End: 2020-11-13

## 2020-11-13 NOTE — PROGRESS NOTES
Please forward this important TCC information to your provider in order to maximize the post discharge care delivery of this patient.    C3 nurse spoke with Mary Haywood  for a TCC post hospital discharge follow up call. The patient does not have a scheduled HOSFU appointment with Primary Doctor No  within 7-14 days post hospital discharge date 11/11/2020. C3 nurse was unable to schedule HOSFU appointment in [facility].  Please contact pcp  and schedule follow up appointment using HOSFU visit type on or before 11/26/2020. NO PCP , out of area for NP.    Respectfully,  Mimi Giordano LPN    [facility]    [email or web address]       Please do not reply to this message, as this inbox is not routinely monitored.

## 2020-11-13 NOTE — PATIENT INSTRUCTIONS
Discharge Instructions for Craniotomy  You had a craniotomy. This means your healthcare provider made an opening in your skull. Your provider needed to do this to do brain surgery. Recovery after a craniotomy varies, depending on why you had the procedure. The guidelines provided here are for general care. Ask your healthcare provider to provide more information based on your own condition.  Home care  Do's and don'ts include:   · Increase your activity slowly. Talk with your healthcare provider about which activities you can start with.  · Dont drive until your healthcare provider says its OK.  · Dont lift anything until your healthcare provider says its OK. Your provider may tell you not to lift more than 10 pounds for a period of time.  · Take your medicine exactly as directed.  · Shower as needed. But keep your incision dry. You can wash your hair with mild soap after your stitches or staples have been removed.  · Dont put creams, lotions, or other ointments to your incision unless your provider tells you to. Keeping the incision clean and dry will help it to heal quickly. Most stitches or staples in the scalp are removed in 7 to 10 days.  · Don't drink alcohol or use recreational drugs.  · Get plenty of rest and sleep.  · Don't take aspirin, ibuprofen, or similar medicines unless your healthcare provider says it's OK.   Follow-up  Make a follow-up appointment.     When to call your healthcare provider  Call your healthcare provider right away if you have any of the following:  · Swelling on the face or scalp  · Incision that becomes red and hot or drainage from incision  · Fever of 100.4°F (38°C) or higher, or as directed by your healthcare provider  · Chills  · Confusion, memory loss, trouble speaking, or hallucinations  · Fainting or blacking out  · Double or blurred vision, or partial or total loss of vision  · Numbness, tingling, or weakness in your face, arms, hands, legs, or feet  · Stiffness in  your neck  · Severe sensitivity to light (photophobia) or severe headache  · Seizure  · Trouble controlling your bowels or bladder  · Nausea or vomiting  · Fluid draining from the nose or ear  · Changes in behavior      Date Last Reviewed: 11/5/2015  © 2340-9388 [employer] [address] [place] [address]. All rights reserved. This information is not intended as a substitute for professional medical care. Always follow your healthcare professional's instructions.

## 2020-11-16 NOTE — DISCHARGE SUMMARY
[facility]  Neurosurgery  Discharge Summary      Patient Name: Mary Haywood  MRN: [id number]  Admission Date: 11/9/2020  Hospital Length of Stay: 3 days  Discharge Date and Time: 11/12/2020 11:14 AM  Attending Physician: Addie att. providers found   Discharging Provider: Mali Gonzalez PA-C  Primary Care Provider: Primary Doctor Addie    HPI:   34 yo female with epilepsy suspected secondary to R temporal cavernoma now s/p craniotomy for resection (11/9).    Procedure(s) (LRB):  CRANIOTOMY, USING FRAMELESS STEREOTAXY FOR  RIGHT SIDED RESECTION OF CAVERNOMA (Right)     Hospital Course: 11/9: Presents with cavernoma of the right temporal lobe. Underwent resection of the cavernoma.   11/10: POD1. NAEO subgaleal drain removed today  11/11: NAEO. Step down to NSGY today. Pt/Ot to evaluate and treat. Incision care and activity recommendations reviewed. Plan of care discussed with patient and father at bedside and they voiced understanding. Their questions were all answered. Drain d/c'd today.   11/12: NAEON. Denies headache, vision changes, or weakness. Incision intact. K 3.2 this AM, replaced with 40mEq. Hypotension this AM, patient asymptomatic denies dizziness or lightheadedness. 500 cc bolus NS, stable for discharge home following. Discharge instructions reviewed with patient and family. They voiced understanding. All of their questions were answered.     Consults: PT/OT  Consults (From admission, onward)        Status Ordering Provider     Inpatient consult to Neurology  Once     Provider:  (Not yet assigned)    ANGELO Vigil          Significant Diagnostic Studies: Labs:   BMP: No results for input(s): GLU, NA, K, CL, CO2, BUN, CREATININE, CALCIUM, MG in the last 48 hours., CBC No results for input(s): WBC, HGB, HCT, PLT in the last 48 hours. and INR   Lab Results   Component Value Date    INR 0.9 11/09/2020    INR 0.9 10/23/2020     [facility]    Pending Diagnostic Studies:     None         Final Active Diagnoses:    Diagnosis Date Noted POA    PRINCIPAL PROBLEM:  Cavernoma [D18.00] 11/09/2020 Yes    Tobacco use [Z72.0] 11/09/2020 Yes    Intractable epilepsy [G40.919] 10/23/2020 Yes    Anxiety and depression [F41.9, F32.9] 07/05/2019 Yes      Problems Resolved During this Admission:      Discharged Condition: good    Disposition: Home or Self Care    Follow Up: 2 weeks for wound check  Follow-up Information     Annemarie Lopez RN On 11/23/2020.    Why: For wound check           Nasra Wetzel MD On 12/22/2020.    Specialty: Neurosurgery  Why: Post-op follow up  Contact information:  [address] [facility] [address]  [phone]                 Patient Instructions:      Notify your health care provider if you experience any of the following:  temperature >100.4     Notify your health care provider if you experience any of the following:  persistent nausea and vomiting or diarrhea     Notify your health care provider if you experience any of the following:  severe uncontrolled pain     Notify your health care provider if you experience any of the following:  redness, tenderness, or signs of infection (pain, swelling, redness, odor or green/yellow discharge around incision site)     Notify your health care provider if you experience any of the following:  difficulty breathing or increased cough     Notify your health care provider if you experience any of the following:  severe persistent headache     Notify your health care provider if you experience any of the following:  worsening rash     Notify your health care provider if you experience any of the following:  persistent dizziness, light-headedness, or visual disturbances     Notify your health care provider if you experience any of the following:  increased confusion or weakness     Activity as tolerated     Medications:  Reconciled Home Medications:      Medication List      START taking these medications     HYDROcodone-acetaminophen 5-325 mg per tablet  Commonly known as: NORCO  Take 1 tablet by mouth every 6 (six) hours as needed.     STOOL SOFTENER-STIMULANT LAXAT 8.6-50 mg per tablet  Generic drug: senna-docusate 8.6-50 mg  Take 1 tablet by mouth 2 (two) times daily. for 14 days        CHANGE how you take these medications    lamoTRIgine 150 MG Tab  Commonly known as: LAMICTAL  Take 3 tablets (450 mg total) by mouth once daily. (300mg in the morning, 150mg at night)  What changed: additional instructions     sertraline 100 MG tablet  Commonly known as: ZOLOFT  Take 1 tablet (100 mg total) by mouth once daily.  What changed: when to take this        CONTINUE taking these medications    cloBAZam 20 mg Tab  Commonly known as: ONFI  Take 2 tablets (40 mg total) by mouth 2 (two) times daily.            Mali Gonzalez PA-C  Neurosurgery  [facility]

## 2020-11-17 ENCOUNTER — RESEARCH ENCOUNTER (OUTPATIENT)
Dept: RESEARCH | Facility: HOSPITAL | Age: 35
End: 2020-11-17

## 2020-11-17 NOTE — PROGRESS NOTES
Patient was approached regarding voluntary participation in IRB protocol #2019.095 study. After reviewing the purpose of the study and addressing questions asked by the patient in order to obtain informed consent, (both in person and over the phone) the patient declined to participate in the study.

## 2020-11-23 ENCOUNTER — CLINICAL SUPPORT (OUTPATIENT)
Dept: NEUROSURGERY | Facility: CLINIC | Age: 35
End: 2020-11-23
Payer: MEDICARE

## 2020-11-23 VITALS
WEIGHT: 139.31 LBS | SYSTOLIC BLOOD PRESSURE: 93 MMHG | HEART RATE: 61 BPM | BODY MASS INDEX: 23.19 KG/M2 | DIASTOLIC BLOOD PRESSURE: 64 MMHG

## 2020-11-23 DIAGNOSIS — D18.00 CAVERNOMA: Primary | ICD-10-CM

## 2020-11-23 PROCEDURE — 99999 PR PBB SHADOW E&M-EST. PATIENT-LVL II: CPT | Mod: PBBFAC,,,

## 2020-11-23 PROCEDURE — 99999 PR PBB SHADOW E&M-EST. PATIENT-LVL II: ICD-10-PCS | Mod: PBBFAC,,,

## 2020-11-23 NOTE — PROGRESS NOTES
Neurosurgery Wound Check Progress Note    Ms Mary Haywood is a 36 y/o female 2 wk S/P right front-temporal craniotomy for removal of cavernous angioma on 11/9/20 with Jamie Wetzel and Sebastian. (For complete diagnosis and procedure, see OP note.)     Patient presents with her father. Gait steady, appears in NAD. Patient denies fever, lethargy, pain. Pt remains on AEDs managed by Dr Hennessy.     Incision is C/D/I, Vicryl sutures intact. No redness, swelling, drainage. Wound edges are approximated, good healing evident.      Discussed need for activity and a nutritional diet high in protein and fresh fruit and green vegetables.     Patient given written instruction which included:    May shower normally but pat dry after shower.   Keep incision clean, dry and open to air as much as possible.   Patient encouraged to walk as much as possible    No lifting >10lbs.    Given follow-up appointments.    All questions were answered. Patient was encouraged to call clinic with any future concerns.        Annemarie Lopez RN  Neurosurgery

## 2020-11-24 ENCOUNTER — PATIENT MESSAGE (OUTPATIENT)
Dept: NEUROLOGY | Facility: CLINIC | Age: 35
End: 2020-11-24

## 2020-11-24 NOTE — LETTER
[facility]  Neurology  [address]  [facility] [address]  Phone: [phone]  Fax: [phone]           Patient: Mary Haywood   YOB: 1985   Today's Date: November 24, 2020       To Whom It May Concern:    Mary Haywood is under my care for refractory epilepsy. Her condition requires large doses of sedating medications daily and she had brain surgery as a palliative measure on 11/09/2020. She is unable to care for a minor at this time do to her complicated health issues.  The added stress could be unsafe for her health. She is not able to adequately or safely take care of any dependants at this time and for the foreseeable future.         Sincerely,          Charmaine Hennessy MD PhD  Neurology-Epilepsy  [facility].  [facility]

## 2020-11-24 NOTE — TELEPHONE ENCOUNTER
Talked with Angelina.     14yo daughter went to dad's house 12/26/2020. She has lived there since then and stayed because of COVID. Daughter was placed in a psych facility. 18yo son graduates next month. No other children in her house under her care.     Very tired from sedating medications.  Recent brain surgery 11/09/2020.     Letter emailed to her personal e-mail describing hardships of caring for minor this time.  She gave verbal permission for me to e-mail this letter to her over the phone.    Letter in Epic.     Charmaine Hennessy MD PhD  Neurology-Epilepsy  [facility].  [facility]

## 2020-11-24 NOTE — TELEPHONE ENCOUNTER
Called Angelina multiple times with no answer.     Called her step father who did answer but then the line got disconnected and he did not answer again.     Charmaine Hennessy MD PhD  Neurology-Epilepsy  [facility].  [facility]

## 2020-12-02 ENCOUNTER — PATIENT MESSAGE (OUTPATIENT)
Dept: NEUROSURGERY | Facility: CLINIC | Age: 35
End: 2020-12-02

## 2020-12-02 ENCOUNTER — PATIENT MESSAGE (OUTPATIENT)
Dept: NEUROLOGY | Facility: CLINIC | Age: 35
End: 2020-12-02

## 2020-12-06 ENCOUNTER — PATIENT MESSAGE (OUTPATIENT)
Dept: NEUROLOGY | Facility: CLINIC | Age: 35
End: 2020-12-06

## 2020-12-22 ENCOUNTER — OFFICE VISIT (OUTPATIENT)
Dept: NEUROSURGERY | Facility: CLINIC | Age: 35
End: 2020-12-22
Payer: MEDICARE

## 2020-12-22 VITALS
BODY MASS INDEX: 23.21 KG/M2 | HEIGHT: 65 IN | SYSTOLIC BLOOD PRESSURE: 116 MMHG | WEIGHT: 139.31 LBS | DIASTOLIC BLOOD PRESSURE: 66 MMHG | HEART RATE: 77 BPM

## 2020-12-22 DIAGNOSIS — G40.109 TEMPORAL LOBE EPILEPSY: Primary | ICD-10-CM

## 2020-12-22 PROCEDURE — 3008F PR BODY MASS INDEX (BMI) DOCUMENTED: ICD-10-PCS | Mod: CPTII,S$GLB,, | Performed by: NEUROLOGICAL SURGERY

## 2020-12-22 PROCEDURE — 99999 PR PBB SHADOW E&M-EST. PATIENT-LVL III: CPT | Mod: PBBFAC,,, | Performed by: NEUROLOGICAL SURGERY

## 2020-12-22 PROCEDURE — 99024 PR POST-OP FOLLOW-UP VISIT: ICD-10-PCS | Mod: S$GLB,,, | Performed by: NEUROLOGICAL SURGERY

## 2020-12-22 PROCEDURE — 99999 PR PBB SHADOW E&M-EST. PATIENT-LVL III: ICD-10-PCS | Mod: PBBFAC,,, | Performed by: NEUROLOGICAL SURGERY

## 2020-12-22 PROCEDURE — 3008F BODY MASS INDEX DOCD: CPT | Mod: CPTII,S$GLB,, | Performed by: NEUROLOGICAL SURGERY

## 2020-12-22 PROCEDURE — 1126F AMNT PAIN NOTED NONE PRSNT: CPT | Mod: S$GLB,,, | Performed by: NEUROLOGICAL SURGERY

## 2020-12-22 PROCEDURE — 99024 POSTOP FOLLOW-UP VISIT: CPT | Mod: S$GLB,,, | Performed by: NEUROLOGICAL SURGERY

## 2020-12-22 PROCEDURE — 1126F PR PAIN SEVERITY QUANTIFIED, NO PAIN PRESENT: ICD-10-PCS | Mod: S$GLB,,, | Performed by: NEUROLOGICAL SURGERY

## 2020-12-23 ENCOUNTER — PATIENT MESSAGE (OUTPATIENT)
Dept: NEUROSURGERY | Facility: CLINIC | Age: 35
End: 2020-12-23

## 2020-12-23 DIAGNOSIS — D18.00 CAVERNOMA: Primary | ICD-10-CM

## 2020-12-23 RX ORDER — CEPHALEXIN 500 MG/1
500 CAPSULE ORAL 4 TIMES DAILY
Qty: 40 CAPSULE | Refills: 0 | Status: SHIPPED | OUTPATIENT
Start: 2020-12-23 | End: 2021-01-02
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
## 2020-12-27 DIAGNOSIS — F43.10 PTSD (POST-TRAUMATIC STRESS DISORDER): ICD-10-CM

## 2020-12-28 RX ORDER — SERTRALINE HYDROCHLORIDE 100 MG/1
100 TABLET, FILM COATED ORAL NIGHTLY
Qty: 90 TABLET | Refills: 3 | Status: SHIPPED | OUTPATIENT
Start: 2020-12-28 | End: 2021-04-05 | Stop reason: SDUPTHER

## 2020-12-29 ENCOUNTER — PATIENT MESSAGE (OUTPATIENT)
Dept: NEUROSURGERY | Facility: CLINIC | Age: 35
End: 2020-12-29

## 2020-12-29 NOTE — TELEPHONE ENCOUNTER
Sertraline 100 mg nightly    Charmaine Hennessy MD PhD  Neurology-Epilepsy  [facility].  [facility]

## 2021-02-09 ENCOUNTER — PATIENT MESSAGE (OUTPATIENT)
Dept: SPINE | Facility: CLINIC | Age: 36
End: 2021-02-09

## 2021-02-12 ENCOUNTER — TELEPHONE (OUTPATIENT)
Dept: NEUROLOGY | Facility: CLINIC | Age: 36
End: 2021-02-12

## 2021-02-16 ENCOUNTER — PATIENT MESSAGE (OUTPATIENT)
Dept: SPINE | Facility: CLINIC | Age: 36
End: 2021-02-16

## 2021-02-16 ENCOUNTER — PATIENT MESSAGE (OUTPATIENT)
Dept: NEUROLOGY | Facility: CLINIC | Age: 36
End: 2021-02-16

## 2021-03-15 DIAGNOSIS — G40.219 COMPLEX PARTIAL EPILEPSY WITH GENERALIZATION AND WITH INTRACTABLE EPILEPSY: ICD-10-CM

## 2021-03-15 RX ORDER — CLOBAZAM 20 MG/1
80 TABLET ORAL NIGHTLY
Qty: 360 TABLET | Refills: 1 | Status: SHIPPED | OUTPATIENT
Start: 2021-03-15 | End: 2021-04-05 | Stop reason: SDUPTHER

## 2021-03-19 ENCOUNTER — PATIENT MESSAGE (OUTPATIENT)
Dept: NEUROLOGY | Facility: CLINIC | Age: 36
End: 2021-03-19

## 2021-04-05 ENCOUNTER — OFFICE VISIT (OUTPATIENT)
Dept: NEUROLOGY | Facility: CLINIC | Age: 36
End: 2021-04-05
Payer: MEDICARE

## 2021-04-05 DIAGNOSIS — F06.70 MILD NEUROCOGNITIVE DISORDER DUE TO ANOTHER MEDICAL CONDITION: Primary | ICD-10-CM

## 2021-04-05 DIAGNOSIS — D18.00 CAVERNOMA: ICD-10-CM

## 2021-04-05 DIAGNOSIS — F43.10 PTSD (POST-TRAUMATIC STRESS DISORDER): ICD-10-CM

## 2021-04-05 DIAGNOSIS — K59.00 CONSTIPATION, UNSPECIFIED CONSTIPATION TYPE: ICD-10-CM

## 2021-04-05 DIAGNOSIS — G40.219 COMPLEX PARTIAL EPILEPSY WITH GENERALIZATION AND WITH INTRACTABLE EPILEPSY: ICD-10-CM

## 2021-04-05 DIAGNOSIS — Z98.890 HISTORY OF BRAIN SURGERY: ICD-10-CM

## 2021-04-05 PROCEDURE — 99214 OFFICE O/P EST MOD 30 MIN: CPT | Mod: GT,,, | Performed by: PSYCHIATRY & NEUROLOGY

## 2021-04-05 PROCEDURE — 99214 PR OFFICE/OUTPT VISIT, EST, LEVL IV, 30-39 MIN: ICD-10-PCS | Mod: GT,,, | Performed by: PSYCHIATRY & NEUROLOGY

## 2021-04-05 RX ORDER — CLOBAZAM 20 MG/1
40 TABLET ORAL NIGHTLY
Qty: 180 TABLET | Refills: 1 | Status: SHIPPED | OUTPATIENT
Start: 2021-04-05 | End: 2021-06-16 | Stop reason: SDUPTHER

## 2021-04-05 RX ORDER — LAMOTRIGINE 150 MG/1
450 TABLET ORAL NIGHTLY
Qty: 270 TABLET | Refills: 3 | Status: SHIPPED | OUTPATIENT
Start: 2021-04-05 | End: 2021-06-16 | Stop reason: SDUPTHER

## 2021-04-05 RX ORDER — SERTRALINE HYDROCHLORIDE 100 MG/1
100 TABLET, FILM COATED ORAL NIGHTLY
Qty: 90 TABLET | Refills: 3 | Status: SHIPPED | OUTPATIENT
Start: 2021-04-05 | End: 2021-06-16 | Stop reason: SDUPTHER

## 2021-04-30 ENCOUNTER — LAB VISIT (OUTPATIENT)
Dept: LAB | Facility: HOSPITAL | Age: 36
End: 2021-04-30
Attending: PSYCHIATRY & NEUROLOGY
Payer: MEDICARE

## 2021-04-30 DIAGNOSIS — G40.219 COMPLEX PARTIAL EPILEPSY WITH GENERALIZATION AND WITH INTRACTABLE EPILEPSY: ICD-10-CM

## 2021-04-30 PROCEDURE — 80339 ANTIEPILEPTICS NOS 1-3: CPT | Performed by: PSYCHIATRY & NEUROLOGY

## 2021-04-30 PROCEDURE — 36415 COLL VENOUS BLD VENIPUNCTURE: CPT | Performed by: PSYCHIATRY & NEUROLOGY

## 2021-04-30 PROCEDURE — 80175 DRUG SCREEN QUAN LAMOTRIGINE: CPT | Performed by: PSYCHIATRY & NEUROLOGY

## 2021-05-03 LAB — LAMOTRIGINE SERPL-MCNC: 2.7 UG/ML (ref 2–15)

## 2021-05-04 LAB
CLOBAZAM SERPL-MCNC: 622 NG/ML (ref 30–300)
NORCLOBAZAM SERPL-MCNC: ABNORMAL NG/ML (ref 300–3000)

## 2021-06-03 ENCOUNTER — PATIENT MESSAGE (OUTPATIENT)
Dept: NEUROLOGY | Facility: CLINIC | Age: 36
End: 2021-06-03

## 2021-06-16 ENCOUNTER — OFFICE VISIT (OUTPATIENT)
Dept: NEUROLOGY | Facility: CLINIC | Age: 36
End: 2021-06-16
Payer: MEDICARE

## 2021-06-16 ENCOUNTER — TELEPHONE (OUTPATIENT)
Dept: NEUROLOGY | Facility: CLINIC | Age: 36
End: 2021-06-16

## 2021-06-16 DIAGNOSIS — G40.219 COMPLEX PARTIAL EPILEPSY WITH GENERALIZATION AND WITH INTRACTABLE EPILEPSY: Primary | ICD-10-CM

## 2021-06-16 DIAGNOSIS — F32.A ANXIETY AND DEPRESSION: ICD-10-CM

## 2021-06-16 DIAGNOSIS — F17.200 CURRENTLY SMOKES TOBACCO: ICD-10-CM

## 2021-06-16 DIAGNOSIS — F41.9 ANXIETY AND DEPRESSION: ICD-10-CM

## 2021-06-16 DIAGNOSIS — Z98.890 HISTORY OF BRAIN SURGERY: ICD-10-CM

## 2021-06-16 DIAGNOSIS — F43.10 PTSD (POST-TRAUMATIC STRESS DISORDER): ICD-10-CM

## 2021-06-16 PROCEDURE — 99214 OFFICE O/P EST MOD 30 MIN: CPT | Mod: GT,,, | Performed by: PSYCHIATRY & NEUROLOGY

## 2021-06-16 PROCEDURE — 99214 PR OFFICE/OUTPT VISIT, EST, LEVL IV, 30-39 MIN: ICD-10-PCS | Mod: GT,,, | Performed by: PSYCHIATRY & NEUROLOGY

## 2021-06-16 RX ORDER — LAMOTRIGINE 150 MG/1
300 TABLET ORAL 2 TIMES DAILY
Qty: 360 TABLET | Refills: 3 | Status: SHIPPED | OUTPATIENT
Start: 2021-08-01 | End: 2022-02-03 | Stop reason: SDUPTHER

## 2021-06-16 RX ORDER — SERTRALINE HYDROCHLORIDE 100 MG/1
100 TABLET, FILM COATED ORAL NIGHTLY
Qty: 90 TABLET | Refills: 3 | Status: SHIPPED | OUTPATIENT
Start: 2021-06-16 | End: 2022-02-03 | Stop reason: SDUPTHER

## 2021-06-16 RX ORDER — CLOBAZAM 20 MG/1
40 TABLET ORAL NIGHTLY
Qty: 180 TABLET | Refills: 1 | Status: SHIPPED | OUTPATIENT
Start: 2021-06-16 | End: 2021-07-22 | Stop reason: SDUPTHER

## 2021-07-22 DIAGNOSIS — G40.219 COMPLEX PARTIAL EPILEPSY WITH GENERALIZATION AND WITH INTRACTABLE EPILEPSY: ICD-10-CM

## 2021-07-22 RX ORDER — CLOBAZAM 20 MG/1
40 TABLET ORAL NIGHTLY
Qty: 180 TABLET | Refills: 1 | Status: SHIPPED | OUTPATIENT
Start: 2021-07-22 | End: 2021-08-26 | Stop reason: SDUPTHER

## 2021-08-22 ENCOUNTER — PATIENT MESSAGE (OUTPATIENT)
Dept: NEUROLOGY | Facility: CLINIC | Age: 36
End: 2021-08-22

## 2021-08-26 ENCOUNTER — PATIENT MESSAGE (OUTPATIENT)
Dept: NEUROLOGY | Facility: CLINIC | Age: 36
End: 2021-08-26

## 2021-08-26 DIAGNOSIS — G40.219 COMPLEX PARTIAL EPILEPSY WITH GENERALIZATION AND WITH INTRACTABLE EPILEPSY: Primary | ICD-10-CM

## 2021-08-26 RX ORDER — CLOBAZAM 20 MG/1
40 TABLET ORAL NIGHTLY
Qty: 180 TABLET | Refills: 1 | Status: SHIPPED | OUTPATIENT
Start: 2021-08-26 | End: 2021-09-29 | Stop reason: SDUPTHER

## 2021-09-04 ENCOUNTER — PATIENT MESSAGE (OUTPATIENT)
Dept: NEUROLOGY | Facility: CLINIC | Age: 36
End: 2021-09-04

## 2021-09-16 ENCOUNTER — PATIENT MESSAGE (OUTPATIENT)
Dept: NEUROLOGY | Facility: CLINIC | Age: 36
End: 2021-09-16

## 2021-09-28 ENCOUNTER — PATIENT MESSAGE (OUTPATIENT)
Dept: NEUROLOGY | Facility: CLINIC | Age: 36
End: 2021-09-28

## 2021-09-29 ENCOUNTER — PATIENT MESSAGE (OUTPATIENT)
Dept: NEUROLOGY | Facility: CLINIC | Age: 36
End: 2021-09-29

## 2021-09-29 DIAGNOSIS — G40.219 COMPLEX PARTIAL EPILEPSY WITH GENERALIZATION AND WITH INTRACTABLE EPILEPSY: ICD-10-CM

## 2021-09-29 RX ORDER — CLOBAZAM 20 MG/1
40 TABLET ORAL NIGHTLY
Qty: 180 TABLET | Refills: 1 | Status: SHIPPED | OUTPATIENT
Start: 2021-09-29 | End: 2021-10-05 | Stop reason: SDUPTHER

## 2021-10-05 ENCOUNTER — PATIENT MESSAGE (OUTPATIENT)
Dept: NEUROLOGY | Facility: CLINIC | Age: 36
End: 2021-10-05

## 2021-10-05 DIAGNOSIS — G40.219 COMPLEX PARTIAL EPILEPSY WITH GENERALIZATION AND WITH INTRACTABLE EPILEPSY: ICD-10-CM

## 2021-10-05 RX ORDER — CLOBAZAM 20 MG/1
40 TABLET ORAL NIGHTLY
Qty: 60 TABLET | Refills: 5 | Status: SHIPPED | OUTPATIENT
Start: 2021-10-05 | End: 2021-11-02 | Stop reason: SDUPTHER

## 2021-11-02 ENCOUNTER — TELEPHONE (OUTPATIENT)
Dept: NEUROLOGY | Facility: CLINIC | Age: 36
End: 2021-11-02
Payer: MEDICARE

## 2021-11-02 DIAGNOSIS — G40.219 COMPLEX PARTIAL EPILEPSY WITH GENERALIZATION AND WITH INTRACTABLE EPILEPSY: ICD-10-CM

## 2021-11-02 RX ORDER — CLOBAZAM 20 MG/1
40 TABLET ORAL NIGHTLY
Qty: 60 TABLET | Refills: 5 | Status: SHIPPED | OUTPATIENT
Start: 2021-11-02 | End: 2021-12-15 | Stop reason: SDUPTHER

## 2021-11-16 ENCOUNTER — PATIENT MESSAGE (OUTPATIENT)
Dept: NEUROLOGY | Facility: CLINIC | Age: 36
End: 2021-11-16
Payer: MEDICARE

## 2021-12-15 ENCOUNTER — TELEPHONE (OUTPATIENT)
Dept: NEUROLOGY | Facility: CLINIC | Age: 36
End: 2021-12-15
Payer: MEDICARE

## 2021-12-15 DIAGNOSIS — G40.219 COMPLEX PARTIAL EPILEPSY WITH GENERALIZATION AND WITH INTRACTABLE EPILEPSY: ICD-10-CM

## 2021-12-15 RX ORDER — CLOBAZAM 20 MG/1
40 TABLET ORAL NIGHTLY
Qty: 60 TABLET | Refills: 5 | Status: SHIPPED | OUTPATIENT
Start: 2021-12-15 | End: 2022-01-30 | Stop reason: SDUPTHER

## 2022-01-02 ENCOUNTER — PATIENT MESSAGE (OUTPATIENT)
Dept: NEUROLOGY | Facility: CLINIC | Age: 37
End: 2022-01-02
Payer: MEDICARE

## 2022-01-02 DIAGNOSIS — F17.200 CURRENTLY SMOKES TOBACCO: Primary | ICD-10-CM

## 2022-01-02 NOTE — TELEPHONE ENCOUNTER
Referral to smoking cessation program    Charmaine Hennessy MD PhD  Neurology-Epilepsy  [facility].

## 2022-01-03 ENCOUNTER — PATIENT MESSAGE (OUTPATIENT)
Dept: NEUROLOGY | Facility: CLINIC | Age: 37
End: 2022-01-03
Payer: MEDICARE

## 2022-01-30 ENCOUNTER — PATIENT MESSAGE (OUTPATIENT)
Dept: NEUROLOGY | Facility: CLINIC | Age: 37
End: 2022-01-30
Payer: MEDICARE

## 2022-01-30 DIAGNOSIS — G40.219 COMPLEX PARTIAL EPILEPSY WITH GENERALIZATION AND WITH INTRACTABLE EPILEPSY: ICD-10-CM

## 2022-01-30 RX ORDER — CLOBAZAM 20 MG/1
40 TABLET ORAL NIGHTLY
Qty: 60 TABLET | Refills: 5 | Status: SHIPPED | OUTPATIENT
Start: 2022-01-30 | End: 2022-02-03 | Stop reason: SDUPTHER

## 2022-01-31 ENCOUNTER — PATIENT MESSAGE (OUTPATIENT)
Dept: NEUROLOGY | Facility: CLINIC | Age: 37
End: 2022-01-31
Payer: MEDICARE

## 2022-01-31 ENCOUNTER — TELEPHONE (OUTPATIENT)
Dept: NEUROLOGY | Facility: CLINIC | Age: 37
End: 2022-01-31
Payer: MEDICARE

## 2022-01-31 NOTE — TELEPHONE ENCOUNTER
Clobazam 40mg qhs    Charmaine Hennessy MD PhD  Neurology-Epilepsy  [facility].

## 2022-02-03 ENCOUNTER — PATIENT MESSAGE (OUTPATIENT)
Dept: NEUROLOGY | Facility: CLINIC | Age: 37
End: 2022-02-03
Payer: MEDICARE

## 2022-02-03 DIAGNOSIS — F43.10 PTSD (POST-TRAUMATIC STRESS DISORDER): ICD-10-CM

## 2022-02-03 DIAGNOSIS — G40.219 COMPLEX PARTIAL EPILEPSY WITH GENERALIZATION AND WITH INTRACTABLE EPILEPSY: ICD-10-CM

## 2022-02-03 RX ORDER — LAMOTRIGINE 150 MG/1
300 TABLET ORAL 2 TIMES DAILY
Qty: 360 TABLET | Refills: 3 | Status: SHIPPED | OUTPATIENT
Start: 2022-02-03 | End: 2023-03-08

## 2022-02-03 RX ORDER — CLOBAZAM 20 MG/1
40 TABLET ORAL NIGHTLY
Qty: 60 TABLET | Refills: 5 | Status: SHIPPED | OUTPATIENT
Start: 2022-02-03 | End: 2022-05-09 | Stop reason: SDUPTHER

## 2022-02-03 RX ORDER — SERTRALINE HYDROCHLORIDE 100 MG/1
100 TABLET, FILM COATED ORAL NIGHTLY
Qty: 90 TABLET | Refills: 3 | Status: SHIPPED | OUTPATIENT
Start: 2022-02-03 | End: 2023-03-08

## 2022-02-03 NOTE — TELEPHONE ENCOUNTER
Clobazam 40 mg q.h.s.  Lamotrigine 300 mg twice daily  Sertraline 100 mg daily    Interested in obtaining a medical marijuana card which is not something our office provides.    Charmaine Hennessy MD PhD  Neurology-Epilepsy  [facility].

## 2022-04-16 ENCOUNTER — PATIENT MESSAGE (OUTPATIENT)
Dept: NEUROLOGY | Facility: CLINIC | Age: 37
End: 2022-04-16
Payer: MEDICARE

## 2022-04-29 DIAGNOSIS — G40.219 COMPLEX PARTIAL EPILEPSY WITH GENERALIZATION AND WITH INTRACTABLE EPILEPSY: ICD-10-CM

## 2022-04-29 RX ORDER — LAMOTRIGINE 150 MG/1
TABLET ORAL
Qty: 270 TABLET | Refills: 2 | OUTPATIENT
Start: 2022-04-29

## 2022-04-29 NOTE — TELEPHONE ENCOUNTER
Refused refill request from [facility] in [place] -> wrong pharmacy, wrong dose    Charmaine Hennessy MD PhD  Neurology-Epilepsy  [facility].

## 2022-05-09 ENCOUNTER — TELEPHONE (OUTPATIENT)
Dept: NEUROLOGY | Facility: HOSPITAL | Age: 37
End: 2022-05-09
Payer: MEDICARE

## 2022-05-09 DIAGNOSIS — G40.219 COMPLEX PARTIAL EPILEPSY WITH GENERALIZATION AND WITH INTRACTABLE EPILEPSY: ICD-10-CM

## 2022-05-09 RX ORDER — CLOBAZAM 20 MG/1
40 TABLET ORAL NIGHTLY
Qty: 60 TABLET | Refills: 5 | Status: SHIPPED | OUTPATIENT
Start: 2022-05-09 | End: 2022-11-08 | Stop reason: SDUPTHER

## 2022-05-09 NOTE — TELEPHONE ENCOUNTER
Clobazam 40 mg at night    Charmaine Hennessy MD PhD  Neurology-Epilepsy  [facility].          ----- Message from Patricia Parsons sent at 2022  7:13 AM CDT -----  Regarding: Self/  [phone]  Type: RX Refill Request    Who Called:  Patient    Refill or New Rx:  Refill    RX Name and Strength:  cloBAZam (ONFI) 20 mg Tab    Preferred Pharmacy with phone number:  [id number] - [place], [place] - [address]    Local or Mail Order:  Local    Ordering Provider:  MANN Hennessy    Would the patient rather a call back or a response via [facility]?   Call back    Best Call Back Number:   [phone] (home)        Patient is needing medication called in today.  Her prescription has  and pharmacy is needing another prescription.  Thank you

## 2022-06-14 ENCOUNTER — PATIENT MESSAGE (OUTPATIENT)
Dept: NEUROLOGY | Facility: CLINIC | Age: 37
End: 2022-06-14
Payer: MEDICARE

## 2022-07-05 ENCOUNTER — PATIENT MESSAGE (OUTPATIENT)
Dept: NEUROLOGY | Facility: CLINIC | Age: 37
End: 2022-07-05
Payer: MEDICARE

## 2022-08-16 ENCOUNTER — TELEPHONE (OUTPATIENT)
Dept: NEUROLOGY | Facility: CLINIC | Age: 37
End: 2022-08-16
Payer: MEDICARE

## 2022-08-16 NOTE — TELEPHONE ENCOUNTER
----- Message from Charmaine Hennessy MD PhD sent at 8/16/2022 12:48 PM CDT -----  Regarding: FW: Patient call back  Follow up with Flores mukherjee.     Charmaine     ----- Message -----  From: Caty Michelle  Sent: 8/16/2022   9:46 AM CDT  To: [facility] Staff  Subject: Patient call back                                Who called:JORDAN CARRASQUILLO [[id number]]    What is the request in detail: Patient is requesting a call back. She states she completed her procedure with Dr. Christie but her seizures are back. She would like to further discuss.   Please advise.    Can the clinic reply by [facility]? No    Best call back number: [phone]     Additional Information: N/A

## 2022-08-19 ENCOUNTER — TELEPHONE (OUTPATIENT)
Dept: NEUROLOGY | Facility: CLINIC | Age: 37
End: 2022-08-19
Payer: MEDICARE

## 2022-08-19 NOTE — TELEPHONE ENCOUNTER
----- Message from Charmaine Hennessy MD PhD sent at 8/19/2022 11:57 AM CDT -----  Regarding: FW: call back  Can you please call her back to schedule with Flores?  Do mind figuring out which question she has about what medication?    Thank you!  Charmaine     ----- Message -----  From: Crystal Rosales  Sent: 8/19/2022  11:29 AM CDT  To: [facility]ano Staff  Subject: call back                                        Name of Who is Calling:JORDAN CARRASQUILLO [[id number]]          What is the request in detail: Patient is requesting a call back in reference to medication for smoking program           Can the clinic reply by [facility]:           What Number to Call Back if not in [facility]: [phone]

## 2022-08-24 ENCOUNTER — TELEPHONE (OUTPATIENT)
Dept: NEUROLOGY | Facility: CLINIC | Age: 37
End: 2022-08-24
Payer: MEDICARE

## 2022-08-24 NOTE — TELEPHONE ENCOUNTER
----- Message from Tonya Zhang sent at 8/24/2022 11:24 AM CDT -----      Name of Who is Calling: JORDAN CARRASQUILLO [[id number]]      What is the request in detail: Pt called to get rescheduled from missed appt.Please contact to further discuss and advise.          Can the clinic reply by [facility]: N      What [facility]: [phone]

## 2022-09-07 ENCOUNTER — LAB VISIT (OUTPATIENT)
Dept: LAB | Facility: HOSPITAL | Age: 37
End: 2022-09-07
Payer: MEDICARE

## 2022-09-07 ENCOUNTER — OFFICE VISIT (OUTPATIENT)
Dept: NEUROLOGY | Facility: CLINIC | Age: 37
End: 2022-09-07
Payer: MEDICARE

## 2022-09-07 VITALS
HEIGHT: 65 IN | DIASTOLIC BLOOD PRESSURE: 59 MMHG | WEIGHT: 121 LBS | SYSTOLIC BLOOD PRESSURE: 94 MMHG | BODY MASS INDEX: 20.16 KG/M2 | HEART RATE: 65 BPM

## 2022-09-07 DIAGNOSIS — G40.219 COMPLEX PARTIAL EPILEPSY WITH GENERALIZATION AND WITH INTRACTABLE EPILEPSY: Primary | ICD-10-CM

## 2022-09-07 DIAGNOSIS — G40.219 COMPLEX PARTIAL EPILEPSY WITH GENERALIZATION AND WITH INTRACTABLE EPILEPSY: ICD-10-CM

## 2022-09-07 DIAGNOSIS — F43.10 PTSD (POST-TRAUMATIC STRESS DISORDER): ICD-10-CM

## 2022-09-07 DIAGNOSIS — F41.9 ANXIETY AND DEPRESSION: ICD-10-CM

## 2022-09-07 DIAGNOSIS — Z98.890 HISTORY OF BRAIN SURGERY: ICD-10-CM

## 2022-09-07 DIAGNOSIS — F32.A ANXIETY AND DEPRESSION: ICD-10-CM

## 2022-09-07 PROCEDURE — 3008F BODY MASS INDEX DOCD: CPT | Mod: CPTII,S$GLB,,

## 2022-09-07 PROCEDURE — 80175 DRUG SCREEN QUAN LAMOTRIGINE: CPT

## 2022-09-07 PROCEDURE — 36415 COLL VENOUS BLD VENIPUNCTURE: CPT

## 2022-09-07 PROCEDURE — 99215 OFFICE O/P EST HI 40 MIN: CPT | Mod: FS,S$GLB,,

## 2022-09-07 PROCEDURE — 99999 PR PBB SHADOW E&M-EST. PATIENT-LVL III: CPT | Mod: PBBFAC,,,

## 2022-09-07 PROCEDURE — 99215 PR OFFICE/OUTPT VISIT, EST, LEVL V, 40-54 MIN: ICD-10-PCS | Mod: FS,S$GLB,,

## 2022-09-07 PROCEDURE — 3078F DIAST BP <80 MM HG: CPT | Mod: CPTII,S$GLB,,

## 2022-09-07 PROCEDURE — 3074F PR MOST RECENT SYSTOLIC BLOOD PRESSURE < 130 MM HG: ICD-10-PCS | Mod: CPTII,S$GLB,,

## 2022-09-07 PROCEDURE — 99417 PROLNG OP E/M EACH 15 MIN: CPT | Mod: S$GLB,,,

## 2022-09-07 PROCEDURE — 3074F SYST BP LT 130 MM HG: CPT | Mod: CPTII,S$GLB,,

## 2022-09-07 PROCEDURE — 3008F PR BODY MASS INDEX (BMI) DOCUMENTED: ICD-10-PCS | Mod: CPTII,S$GLB,,

## 2022-09-07 PROCEDURE — 99999 PR PBB SHADOW E&M-EST. PATIENT-LVL III: ICD-10-PCS | Mod: PBBFAC,,,

## 2022-09-07 PROCEDURE — 3078F PR MOST RECENT DIASTOLIC BLOOD PRESSURE < 80 MM HG: ICD-10-PCS | Mod: CPTII,S$GLB,,

## 2022-09-07 PROCEDURE — 99417 PR PROLONGED SVC, OUTPT, W/WO DIRECT PT CONTACT,  EA ADDTL 15 MIN: ICD-10-PCS | Mod: S$GLB,,,

## 2022-09-07 PROCEDURE — 80339 ANTIEPILEPTICS NOS 1-3: CPT

## 2022-09-07 NOTE — PATIENT INSTRUCTIONS
You came to Epilepsy Clinic because of your seizure disorder. Your seizures are partially controlled on lamotrigine 600mg daily and clobazam 40mg daily.     We recommend taking lamotrigine 300mg in the morning along with the sertraline 100mg. In the evening, please take lamotrigine 300mg and clobazam 40mg. I think this will give you better coverage. If you have an additional breakthrough seizure, please let us know and we will increase the medication.     Do not miss any doses of medication. If a dose of medication is missed, take it as soon as it is remembered even if that means doubling up on the dose. Please get a lab test to check out the blood level of medication. Get regular sleep. Go to sleep at the same time and wake up at the same time every day. People with epilepsy require more sleep than people without epilepsy.  Sleeping 10-12 hours a day can be normal for a person with epilepsy.  Every seizure makes it harder to prevent the next seizure. Epilepsy is associated with SUDEP, or sudden unexpected death in epilepsy.  The risk is significantly higher if convulsive seizures are not well controlled. For more information, check out these websites: https://www.epilepsy.com/, https://www.epilepsyallianceamerica.org/, www.[place]-epilepsy.org, www.womenandepilepsy.org.      I placed a referral for you to speak with our epilepsy  Saulo who is also a licensed therapist. He will message you over the patient portal to set up a virtual visit. I think it may be beneficial for you to have an additional person in your corner for support.     Per [place] law, no episodes of loss of consciousness for 6 months before driving.  Avoid dangerous situations.  For example, no baths/pools alone, no heights, no power tools.  Wear a bike helmet.  If breakthrough seizures occur that are different in character, frequency, or duration from normal episodes, please patient portal me or call the office and we will decide the  next steps. If multiple seizures occur in a row without return back to baseline, 911 needs to be called.     Return to clinic in 6 months or sooner with issues.  Please patient portal with any questions or concerns.    Zelda Hawkins PA-C   Neurology-Epilepsy  [facility]
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
## 2022-09-08 LAB
CLOBAZAM SERPL-MCNC: 142 NG/ML (ref 30–300)
NORCLOBAZAM SERPL-MCNC: ABNORMAL NG/ML (ref 300–3000)

## 2022-09-11 LAB — LAMOTRIGINE SERPL-MCNC: 2.4 UG/ML (ref 2–15)

## 2022-10-25 ENCOUNTER — PATIENT MESSAGE (OUTPATIENT)
Dept: NEUROLOGY | Facility: HOSPITAL | Age: 37
End: 2022-10-25
Payer: MEDICARE

## 2022-10-25 ENCOUNTER — TELEPHONE (OUTPATIENT)
Dept: NEUROLOGY | Facility: HOSPITAL | Age: 37
End: 2022-10-25
Payer: MEDICARE

## 2022-10-25 NOTE — TELEPHONE ENCOUNTER
Message asking for more information sent over the portal.     Charmaine Hennessy MD PhD  Neurology-Epilepsy  [facility].      ----- Message from Mali Paulson sent at 10/24/2022 11:07 AM CDT -----  Name of Who is Calling:JORDAN CARRASQUILLO [[id number]]              What is the request in detail:Requesting a call back regarding paperwork for court.              Can the clinic reply by [facility]:              What Number to Call Back if not [facility]:[phone]

## 2022-11-03 ENCOUNTER — OFFICE VISIT (OUTPATIENT)
Dept: NEUROLOGY | Facility: CLINIC | Age: 37
End: 2022-11-03
Payer: MEDICARE

## 2022-11-03 DIAGNOSIS — F41.9 ANXIETY AND DEPRESSION: ICD-10-CM

## 2022-11-03 DIAGNOSIS — Z63.8 FAMILY CONFLICT: Primary | ICD-10-CM

## 2022-11-03 DIAGNOSIS — F06.70 MILD NEUROCOGNITIVE DISORDER DUE TO ANOTHER MEDICAL CONDITION: ICD-10-CM

## 2022-11-03 DIAGNOSIS — M62.838 MUSCLE SPASM: ICD-10-CM

## 2022-11-03 DIAGNOSIS — G40.211 PARTIAL SYMPTOMATIC EPILEPSY WITH COMPLEX PARTIAL SEIZURES, INTRACTABLE, WITH STATUS EPILEPTICUS: ICD-10-CM

## 2022-11-03 DIAGNOSIS — G40.219 COMPLEX PARTIAL EPILEPSY WITH GENERALIZATION AND WITH INTRACTABLE EPILEPSY: ICD-10-CM

## 2022-11-03 DIAGNOSIS — Z98.890 HISTORY OF BRAIN SURGERY: ICD-10-CM

## 2022-11-03 DIAGNOSIS — F32.A ANXIETY AND DEPRESSION: ICD-10-CM

## 2022-11-03 DIAGNOSIS — D18.00 CAVERNOMA: ICD-10-CM

## 2022-11-03 DIAGNOSIS — F43.10 PTSD (POST-TRAUMATIC STRESS DISORDER): ICD-10-CM

## 2022-11-03 PROCEDURE — 1160F PR REVIEW ALL MEDS BY PRESCRIBER/CLIN PHARMACIST DOCUMENTED: ICD-10-PCS | Mod: CPTII,95,, | Performed by: PSYCHIATRY & NEUROLOGY

## 2022-11-03 PROCEDURE — 99215 OFFICE O/P EST HI 40 MIN: CPT | Mod: GT,,, | Performed by: PSYCHIATRY & NEUROLOGY

## 2022-11-03 PROCEDURE — 1160F RVW MEDS BY RX/DR IN RCRD: CPT | Mod: CPTII,95,, | Performed by: PSYCHIATRY & NEUROLOGY

## 2022-11-03 PROCEDURE — 1159F MED LIST DOCD IN RCRD: CPT | Mod: CPTII,95,, | Performed by: PSYCHIATRY & NEUROLOGY

## 2022-11-03 PROCEDURE — 1159F PR MEDICATION LIST DOCUMENTED IN MEDICAL RECORD: ICD-10-PCS | Mod: CPTII,95,, | Performed by: PSYCHIATRY & NEUROLOGY

## 2022-11-03 PROCEDURE — 99215 PR OFFICE/OUTPT VISIT, EST, LEVL V, 40-54 MIN: ICD-10-PCS | Mod: GT,,, | Performed by: PSYCHIATRY & NEUROLOGY

## 2022-11-03 RX ORDER — TIZANIDINE 2 MG/1
2 TABLET ORAL NIGHTLY PRN
Qty: 90 TABLET | Refills: 3 | Status: SHIPPED | OUTPATIENT
Start: 2022-11-03 | End: 2023-08-16

## 2022-11-03 SDOH — SOCIAL DETERMINANTS OF HEALTH (SDOH): OTHER SPECIFIED PROBLEMS RELATED TO PRIMARY SUPPORT GROUP: Z63.8

## 2022-11-03 NOTE — PROGRESS NOTES
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 every evening. 90 tablet 3    tiZANidine (ZANAFLEX) 2 MG tablet Take 1 tablet (2 mg total) by mouth nightly as needed (muscle spasms preventing sleep). 90 tablet 3     No current facility-administered medications for this visit.        FAMILY HISTORY:   Family History   Adopted: Yes   Family history unknown: Yes         SOCIAL HISTORY:   Social History     Socioeconomic History    Marital status: Single   Occupational History    Occupation: disability   Tobacco Use    Smoking status: Every Day     Packs/day: 1.00     Types: Cigarettes     Start date: 7/29/1998    Smokeless tobacco: Never   Substance and Sexual Activity    Alcohol use: Yes     Comment: once a month    Drug use: Yes     Types: Marijuana    Sexual activity: Not Currently     Partners: Male     Birth control/protection: See Surgical Hx, Surgical     Comment:    Social History Narrative    Live alone         Questions and concerns raised by the patient and family/care-giver(s) were addressed and they indicated understanding of everything discussed and agreed to plans as above.    Charmaine Hennessy MD PhD  Neurology-Epilepsy  [facility].

## 2022-11-03 NOTE — PATIENT INSTRUCTIONS
You came [facility] because of your seizure disorder. Your seizures are mostly controlled on lamotrigine 600mg daily and clobazam 40mg daily.      You have a complicated family situation right now and are unable to care for your daughter because of your multiple comorbid medical conditions.  We wrote a letter together explaining your situation.  Hopefully your  will be able to work with all involved in order to find an appropriate solution.    You are having significant insomnia.  Continue to use your nightly meditation apps.  You have dense muscle spasms.  Try tizanidine 2 mg pills (1-2 pills) at night to help you relax and to achieve sleep.  This medication is for symptomatic management only.  If it does not help, you do not need to take it.  It is safe to take the medication only when you need it or every night.     Do not miss any doses of medication. If a dose of medication is missed, take it as soon as it is remembered even if that means doubling up on the dose. Get regular sleep. Go to sleep at the same time and wake up at the same time every day. People with epilepsy require more sleep than people without epilepsy.  Sleeping 10-12 hours a day can be normal for a person with epilepsy.  Every seizure makes it harder to prevent the next seizure. Epilepsy is associated with SUDEP, or sudden unexpected death in epilepsy.  The risk is significantly higher if convulsive seizures are not well controlled. For more information, check out these websites: https://www.epilepsy.com/, https://www.epilepsyalliance[place].org/, www.[place]-epilepsy.org, www.womenandepilepsy.org.       Per [place] law, no episodes of loss of consciousness for 6 months before driving.  Avoid dangerous situations.  For example, no baths/pools alone, no heights, no power tools.  Wear a bike helmet.  If breakthrough seizures occur that are different in character, frequency, or duration from normal episodes, please patient portal me or  call the office and we will decide the next steps. If multiple seizures occur in a row without return back to baseline, 911 needs to be called.      Return to clinic in 6 months or sooner with issues.  Please patient portal with any questions or concerns.    Charmaine Hennessy MD PhD  Neurology-Epilepsy  [facility].

## 2022-11-08 ENCOUNTER — PATIENT MESSAGE (OUTPATIENT)
Dept: NEUROLOGY | Facility: CLINIC | Age: 37
End: 2022-11-08
Payer: MEDICARE

## 2022-11-08 DIAGNOSIS — G40.219 COMPLEX PARTIAL EPILEPSY WITH GENERALIZATION AND WITH INTRACTABLE EPILEPSY: ICD-10-CM

## 2022-11-08 RX ORDER — CLOBAZAM 20 MG/1
40 TABLET ORAL NIGHTLY
Qty: 60 TABLET | Refills: 5 | Status: SHIPPED | OUTPATIENT
Start: 2022-11-08 | End: 2022-12-29 | Stop reason: SDUPTHER

## 2022-11-09 NOTE — TELEPHONE ENCOUNTER
Clobazam 40 nightly    Charmaine Hennessy MD PhD  Neurology-Epilepsy  [facility].

## 2022-12-13 ENCOUNTER — PATIENT MESSAGE (OUTPATIENT)
Dept: NEUROLOGY | Facility: CLINIC | Age: 37
End: 2022-12-13
Payer: MEDICARE

## 2022-12-16 ENCOUNTER — PATIENT MESSAGE (OUTPATIENT)
Dept: NEUROLOGY | Facility: CLINIC | Age: 37
End: 2022-12-16
Payer: MEDICARE

## 2022-12-29 ENCOUNTER — TELEPHONE (OUTPATIENT)
Dept: NEUROLOGY | Facility: CLINIC | Age: 37
End: 2022-12-29
Payer: MEDICARE

## 2022-12-29 DIAGNOSIS — G40.219 COMPLEX PARTIAL EPILEPSY WITH GENERALIZATION AND WITH INTRACTABLE EPILEPSY: ICD-10-CM

## 2022-12-29 RX ORDER — CLOBAZAM 20 MG/1
40 TABLET ORAL NIGHTLY
Qty: 60 TABLET | Refills: 5 | Status: SHIPPED | OUTPATIENT
Start: 2022-12-29 | End: 2023-07-20 | Stop reason: SDUPTHER

## 2022-12-29 NOTE — TELEPHONE ENCOUNTER
Clobazam 40 mg nightly to the pharmacy listed below    Charmaine Hennessy MD PhD  Neurology-Epilepsy  [facility].        ----- Message from Marlena Null sent at 12/29/2022  4:09 PM CST -----  Type: Patient Call Back    Who called:self    What is the request in detail:pt would like cloBAZam (ONFI) 20 mg Tab sent to a new pharmacy due to the previous pharmacy not being able to fill it. Pt states she Is constantly having an issue with [facility] filling her medications on time. Please resend      [facility] - [place], [place] - [address] [place] [address]  Phone: [phone] Fax: [phone]      Can the clinic reply by [facility]?    Would the patient rather a call back or a [facility] via [facility]? Call    Best call back number:[phone] (home)

## 2023-02-09 ENCOUNTER — PATIENT MESSAGE (OUTPATIENT)
Dept: NEUROLOGY | Facility: CLINIC | Age: 38
End: 2023-02-09
Payer: MEDICARE

## 2023-03-07 DIAGNOSIS — G40.219 COMPLEX PARTIAL EPILEPSY WITH GENERALIZATION AND WITH INTRACTABLE EPILEPSY: ICD-10-CM

## 2023-03-07 DIAGNOSIS — F43.10 PTSD (POST-TRAUMATIC STRESS DISORDER): ICD-10-CM

## 2023-03-08 RX ORDER — LAMOTRIGINE 150 MG/1
TABLET ORAL
Qty: 360 TABLET | Refills: 3 | Status: SHIPPED | OUTPATIENT
Start: 2023-03-08 | End: 2024-03-25 | Stop reason: SDUPTHER

## 2023-03-08 RX ORDER — SERTRALINE HYDROCHLORIDE 100 MG/1
TABLET, FILM COATED ORAL
Qty: 90 TABLET | Refills: 3 | Status: SHIPPED | OUTPATIENT
Start: 2023-03-08 | End: 2024-01-18 | Stop reason: SDUPTHER

## 2023-03-08 NOTE — TELEPHONE ENCOUNTER
Sertraline 100 mg nightly  Lamotrigine 300 mg twice daily    Charmaine Hennessy MD PhD  Neurology-Epilepsy  [facility].

## 2023-06-28 ENCOUNTER — PATIENT MESSAGE (OUTPATIENT)
Dept: NEUROLOGY | Facility: CLINIC | Age: 38
End: 2023-06-28
Payer: MEDICARE

## 2023-07-20 DIAGNOSIS — G40.219 COMPLEX PARTIAL EPILEPSY WITH GENERALIZATION AND WITH INTRACTABLE EPILEPSY: ICD-10-CM

## 2023-07-20 RX ORDER — CLOBAZAM 20 MG/1
40 TABLET ORAL NIGHTLY
Qty: 60 TABLET | Refills: 5 | Status: SHIPPED | OUTPATIENT
Start: 2023-07-20 | End: 2024-02-09

## 2023-07-20 NOTE — TELEPHONE ENCOUNTER
cloBAZam (ONFI) 20 mg Tab     Sig: TAKE TWO TABLETS BY MOUTH EVERY EVENING    Disp:  60 tablet    Refills:  5    Start: 7/20/2023    Class: Normal    For: Complex partial epilepsy with generalization and with intractable epilepsy    Last ordered: 6 months ago by Charmaine Hennessy MD PhD Last refill: 6/17/2023    Rx #: [id number]       To be filled at: [facility] - [place], [place] - [address]

## 2023-08-16 ENCOUNTER — OFFICE VISIT (OUTPATIENT)
Dept: NEUROLOGY | Facility: CLINIC | Age: 38
End: 2023-08-16
Payer: MEDICARE

## 2023-08-16 ENCOUNTER — PATIENT MESSAGE (OUTPATIENT)
Dept: NEUROLOGY | Facility: CLINIC | Age: 38
End: 2023-08-16

## 2023-08-16 ENCOUNTER — LAB VISIT (OUTPATIENT)
Dept: LAB | Facility: HOSPITAL | Age: 38
End: 2023-08-16
Payer: MEDICARE

## 2023-08-16 VITALS
WEIGHT: 138 LBS | SYSTOLIC BLOOD PRESSURE: 85 MMHG | HEIGHT: 65 IN | HEART RATE: 55 BPM | BODY MASS INDEX: 22.99 KG/M2 | DIASTOLIC BLOOD PRESSURE: 57 MMHG

## 2023-08-16 DIAGNOSIS — F41.9 ANXIETY AND DEPRESSION: ICD-10-CM

## 2023-08-16 DIAGNOSIS — Z63.8 FAMILY CONFLICT: ICD-10-CM

## 2023-08-16 DIAGNOSIS — E61.1 IRON DEFICIENCY: ICD-10-CM

## 2023-08-16 DIAGNOSIS — M62.838 MUSCLE SPASM: ICD-10-CM

## 2023-08-16 DIAGNOSIS — F32.A ANXIETY AND DEPRESSION: ICD-10-CM

## 2023-08-16 DIAGNOSIS — D18.00 CAVERNOMA: ICD-10-CM

## 2023-08-16 DIAGNOSIS — F43.10 PTSD (POST-TRAUMATIC STRESS DISORDER): ICD-10-CM

## 2023-08-16 DIAGNOSIS — G40.219 COMPLEX PARTIAL EPILEPSY WITH GENERALIZATION AND WITH INTRACTABLE EPILEPSY: ICD-10-CM

## 2023-08-16 DIAGNOSIS — R41.9 UNSPECIFIED SYMPTOMS AND SIGNS INVOLVING COGNITIVE FUNCTIONS AND AWARENESS: ICD-10-CM

## 2023-08-16 DIAGNOSIS — G40.219 COMPLEX PARTIAL EPILEPSY WITH GENERALIZATION AND WITH INTRACTABLE EPILEPSY: Primary | ICD-10-CM

## 2023-08-16 DIAGNOSIS — Z98.890 HISTORY OF BRAIN SURGERY: ICD-10-CM

## 2023-08-16 DIAGNOSIS — R53.83 FATIGUE, UNSPECIFIED TYPE: ICD-10-CM

## 2023-08-16 LAB
ALBUMIN SERPL BCP-MCNC: 3.8 G/DL (ref 3.5–5.2)
ALP SERPL-CCNC: 89 U/L (ref 55–135)
ALT SERPL W/O P-5'-P-CCNC: 9 U/L (ref 10–44)
ANION GAP SERPL CALC-SCNC: 4 MMOL/L (ref 8–16)
AST SERPL-CCNC: 12 U/L (ref 10–40)
BASOPHILS # BLD AUTO: 0.03 K/UL (ref 0–0.2)
BASOPHILS NFR BLD: 0.6 % (ref 0–1.9)
BILIRUB SERPL-MCNC: 0.2 MG/DL (ref 0.1–1)
BUN SERPL-MCNC: 9 MG/DL (ref 6–20)
CALCIUM SERPL-MCNC: 8.4 MG/DL (ref 8.7–10.5)
CHLORIDE SERPL-SCNC: 107 MMOL/L (ref 95–110)
CO2 SERPL-SCNC: 29 MMOL/L (ref 23–29)
CREAT SERPL-MCNC: 0.8 MG/DL (ref 0.5–1.4)
DIFFERENTIAL METHOD: ABNORMAL
EOSINOPHIL # BLD AUTO: 0.2 K/UL (ref 0–0.5)
EOSINOPHIL NFR BLD: 3.6 % (ref 0–8)
ERYTHROCYTE [DISTWIDTH] IN BLOOD BY AUTOMATED COUNT: 13.2 % (ref 11.5–14.5)
EST. GFR  (NO RACE VARIABLE): >60 ML/MIN/1.73 M^2
GLUCOSE SERPL-MCNC: 81 MG/DL (ref 70–110)
HCT VFR BLD AUTO: 37.9 % (ref 37–48.5)
HGB BLD-MCNC: 12.7 G/DL (ref 12–16)
IMM GRANULOCYTES # BLD AUTO: 0 K/UL (ref 0–0.04)
IMM GRANULOCYTES NFR BLD AUTO: 0 % (ref 0–0.5)
IRON SERPL-MCNC: 69 UG/DL (ref 30–160)
LYMPHOCYTES # BLD AUTO: 2.2 K/UL (ref 1–4.8)
LYMPHOCYTES NFR BLD: 45 % (ref 18–48)
MCH RBC QN AUTO: 33.2 PG (ref 27–31)
MCHC RBC AUTO-ENTMCNC: 33.5 G/DL (ref 32–36)
MCV RBC AUTO: 99 FL (ref 82–98)
MONOCYTES # BLD AUTO: 0.3 K/UL (ref 0.3–1)
MONOCYTES NFR BLD: 6.5 % (ref 4–15)
NEUTROPHILS # BLD AUTO: 2.1 K/UL (ref 1.8–7.7)
NEUTROPHILS NFR BLD: 44.3 % (ref 38–73)
NRBC BLD-RTO: 0 /100 WBC
PLATELET # BLD AUTO: 233 K/UL (ref 150–450)
PMV BLD AUTO: 10.9 FL (ref 9.2–12.9)
POTASSIUM SERPL-SCNC: 3.9 MMOL/L (ref 3.5–5.1)
PROT SERPL-MCNC: 6.2 G/DL (ref 6–8.4)
RBC # BLD AUTO: 3.82 M/UL (ref 4–5.4)
SATURATED IRON: 22 % (ref 20–50)
SODIUM SERPL-SCNC: 140 MMOL/L (ref 136–145)
T4 FREE SERPL-MCNC: 0.67 NG/DL (ref 0.71–1.51)
TOTAL IRON BINDING CAPACITY: 315 UG/DL (ref 250–450)
TRANSFERRIN SERPL-MCNC: 213 MG/DL (ref 200–375)
TSH SERPL DL<=0.005 MIU/L-ACNC: 2.62 UIU/ML (ref 0.4–4)
VIT B12 SERPL-MCNC: 287 PG/ML (ref 210–950)
WBC # BLD AUTO: 4.78 K/UL (ref 3.9–12.7)

## 2023-08-16 PROCEDURE — 99999 PR PBB SHADOW E&M-EST. PATIENT-LVL III: CPT | Mod: PBBFAC,,,

## 2023-08-16 PROCEDURE — 80053 COMPREHEN METABOLIC PANEL: CPT

## 2023-08-16 PROCEDURE — 99417 PR PROLONGED SVC, OUTPT, W/WO DIRECT PT CONTACT,  EA ADDTL 15 MIN: ICD-10-PCS | Mod: S$GLB,,,

## 2023-08-16 PROCEDURE — 3074F SYST BP LT 130 MM HG: CPT | Mod: CPTII,S$GLB,,

## 2023-08-16 PROCEDURE — 85025 COMPLETE CBC W/AUTO DIFF WBC: CPT

## 2023-08-16 PROCEDURE — 1159F MED LIST DOCD IN RCRD: CPT | Mod: CPTII,S$GLB,,

## 2023-08-16 PROCEDURE — 99999 PR PBB SHADOW E&M-EST. PATIENT-LVL III: ICD-10-PCS | Mod: PBBFAC,,,

## 2023-08-16 PROCEDURE — 36415 COLL VENOUS BLD VENIPUNCTURE: CPT

## 2023-08-16 PROCEDURE — 99417 PROLNG OP E/M EACH 15 MIN: CPT | Mod: S$GLB,,,

## 2023-08-16 PROCEDURE — 84439 ASSAY OF FREE THYROXINE: CPT

## 2023-08-16 PROCEDURE — 82607 VITAMIN B-12: CPT

## 2023-08-16 PROCEDURE — 99215 PR OFFICE/OUTPT VISIT, EST, LEVL V, 40-54 MIN: ICD-10-PCS | Mod: S$GLB,,,

## 2023-08-16 PROCEDURE — 1159F PR MEDICATION LIST DOCUMENTED IN MEDICAL RECORD: ICD-10-PCS | Mod: CPTII,S$GLB,,

## 2023-08-16 PROCEDURE — 80175 DRUG SCREEN QUAN LAMOTRIGINE: CPT

## 2023-08-16 PROCEDURE — 84443 ASSAY THYROID STIM HORMONE: CPT

## 2023-08-16 PROCEDURE — 3078F DIAST BP <80 MM HG: CPT | Mod: CPTII,S$GLB,,

## 2023-08-16 PROCEDURE — 99215 OFFICE O/P EST HI 40 MIN: CPT | Mod: S$GLB,,,

## 2023-08-16 PROCEDURE — 83540 ASSAY OF IRON: CPT

## 2023-08-16 PROCEDURE — 3078F PR MOST RECENT DIASTOLIC BLOOD PRESSURE < 80 MM HG: ICD-10-PCS | Mod: CPTII,S$GLB,,

## 2023-08-16 PROCEDURE — 84466 ASSAY OF TRANSFERRIN: CPT

## 2023-08-16 PROCEDURE — 3008F PR BODY MASS INDEX (BMI) DOCUMENTED: ICD-10-PCS | Mod: CPTII,S$GLB,,

## 2023-08-16 PROCEDURE — 80339 ANTIEPILEPTICS NOS 1-3: CPT

## 2023-08-16 PROCEDURE — 3074F PR MOST RECENT SYSTOLIC BLOOD PRESSURE < 130 MM HG: ICD-10-PCS | Mod: CPTII,S$GLB,,

## 2023-08-16 PROCEDURE — 3008F BODY MASS INDEX DOCD: CPT | Mod: CPTII,S$GLB,,

## 2023-08-16 SDOH — SOCIAL DETERMINANTS OF HEALTH (SDOH): OTHER SPECIFIED PROBLEMS RELATED TO PRIMARY SUPPORT GROUP: Z63.8

## 2023-08-16 NOTE — PROGRESS NOTES
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    H/O vaginal hysterectomy 07/16/2020    Hormonal disorder 07/16/2020    Partial symptomatic epilepsy with complex partial seizures, intractable, with status epilepticus     Mild 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 10/22/2020    Intractable epilepsy 10/23/2020    Postural dizziness 10/23/2020    Currently smokes tobacco 10/23/2020    Constipation 10/23/2020    Hyperglycemia 10/23/2020    Hyponatremia 10/23/2020    Palpable abdominal aorta 10/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    Macrocytosis without anemia 10/23/2020    Cavernoma 11/09/2020    Tobacco use 11/09/2020    History of brain surgery 04/05/2021    Family conflict 11/03/2022    Muscle spasm 11/03/2022     Resolved Ambulatory Problems     Diagnosis Date Noted    No Resolved Ambulatory Problems     Past Medical History:   Diagnosis Date    ADHD (attention deficit hyperactivity disorder)     Neuromuscular disorder      Overdose of illicit drug         PAST SURGICAL HISTORY:   Past Surgical History:   Procedure Laterality Date    CRANIOTOMY USING FRAMELESS STEREOTAXY Right 11/9/2020    Procedure: CRANIOTOMY, USING FRAMELESS STEREOTAXY FOR  RIGHT SIDED RESECTION OF CAVERNOMA;  Surgeon: Nasra Wetzel MD;  Location: [facility] OR [address] [place] [address];  Service: Neurosurgery;  Laterality: Right;  TORONTO II, ASA II, BLOOD TYPE & CROSS 2 UNITS, HEADREST GRAYSON, REGULAR BED, SUPINE    HYSTERECTOMY  2011    endometriosis    OOPHORECTOMY  2011    ovarian cyst    wisdom teeth removal          ALLERGIES: Patient has no known allergies.   CURRENT MEDICATIONS:   Current Outpatient Medications   Medication Sig Dispense Refill    cloBAZam (ONFI) 20 mg Tab Take 2 tablets (40 mg total) by mouth every evening. 60 tablet 5    lamoTRIgine (LAMICTAL) 150 MG Tab TAKE 2 TABLETS BY MOUTH 2 TIMES DAILY. 360 tablet 3    sertraline (ZOLOFT) 100 MG tablet TAKE 1 TABLET BY MOUTH EVERY EVENING. 90 tablet 3    tiZANidine (ZANAFLEX) 2 MG tablet Take 1 tablet (2 mg total) by mouth nightly as needed (muscle spasms preventing sleep). (Patient not taking: Reported on 8/16/2023) 90 tablet 3     No current facility-administered medications for this visit.        FAMILY HISTORY:   Family History   Adopted: Yes   Family history unknown: Yes         SOCIAL HISTORY:   Social History     Socioeconomic History    Marital status: Single   Occupational History    Occupation: disability   Tobacco Use    Smoking status: Every Day     Current packs/day: 1.00     Average packs/day: 1 pack/day for 25.0 years (25.0 ttl pk-yrs)     Types: Cigarettes     Start date: 7/29/1998    Smokeless tobacco: Never   Substance and Sexual Activity    Alcohol use: Yes     Comment: once a month    Drug use: Yes     Types: Marijuana    Sexual activity: Not Currently     Partners: Male     Birth control/protection: See Surgical Hx, Surgical     Comment:    Social History Narrative    Live alone     Social  Determinants of Health     Stress: Stress Concern Present (12/3/2019)    [employer] - Occupational Stress Questionnaire     Feeling of Stress : To some extent         Questions and concerns raised by the patient and family/care-giver(s) were addressed and they indicated understanding of everything discussed and agreed to plans as above.    Zelda Hawkins PA-C   Neurology-Epilepsy  [facility]    Collaborating physician, Dr. Charmaine Hennessy, was available during today's encounter.     I spent approximately 71 minutes on the day of this encounter preparing to see the patient, obtaining and reviewing history and results, performing a medically appropriate exam, counseling and educating the patient/family/caregiver, documenting clinical information, coordinating care, and ordering medications, tests, procedures, and referrals.

## 2023-08-16 NOTE — PATIENT INSTRUCTIONS
You came [facility] because of your seizure disorder. Your seizures are controlled on clobazam 40mg nightly and lamotrigine 600mg nightly. Please continue the same medications at the same dose.     Do not miss any doses of medication. If a dose of medication is missed, take it as soon as it is remembered even if that means doubling up on the dose. Please get a lab test to check out the blood level of medication.     Please establish with a primary care physician. Try calling the Medicaid Access Center for further assistance finding care: #[phone].     Get regular sleep. Go to sleep at the same time and wake up at the same time every day. People with epilepsy require more sleep than people without epilepsy.  Sleeping 10-12 hours a day can be normal for a person with epilepsy.  Please try exercising regularly. Every seizure makes it harder to prevent the next seizure. Epilepsy is associated with SUDEP, or sudden unexpected death in epilepsy.  The risk is significantly higher if convulsive seizures are not well controlled. For more information, check out these websites: https://www.epilepsy.com/, https://www.epilepsyallianceamerica.org/, www.gómez-epilepsy.org, www.womenandepilepsy.org.  If you are interested in meeting other individuals in our epilepsy community, please reach out to the [facility] [place] ([phone], [phone], [email or web address]@[email or web address]).  They organize many informative and fun activities in the region.  They can provide you invaluable information on how to get access to resources available for patients living with epilepsy as well as a rich community of like-minded individuals who are all learning to cope with the same issues.  It is very important to remember, you are not alone.     Per [place] law, no episodes of loss of consciousness for 6 months before driving.  Avoid dangerous situations.  For example, no baths/pools alone, no heights, no power tools.  Wear a bike  helmet.  If breakthrough seizures occur that are different in character, frequency, or duration from normal episodes, please patient portal me or call the office and we will decide the next steps. If multiple seizures occur in a row without return back to baseline, 911 needs to be called.     Return to clinic in 3-6 months or sooner with issues.  Please patient portal with any questions or concerns.    Zelda Hawkins PA-C   Neurology-Epilepsy  [facility]    Forms/Letters/Disability/DMV Paperwork: We understand the importance of filling out forms and providing letters in a timely manner.  However, many of these forms have very tricky language and once an official form is submitted as part of the medical record, it can not be modified or erased.  Please work with us in order to get these forms filled out in the most complete, accurate, and efficient way. 1.  Once you are aware that a form will need to be completed, please make an appointment.  A virtual appointment with Dr. Hennessy or Flores is perfectly fine. 2.  Please fill out the form as much as you can.  There are many questions that we do not have an answer for.  Please bring a blank copy of the form and your partially filled out form to the clinic visit or send them to us over the portal.  We will complete the form together with you during the clinic visit, sign it, and either return it to you or send it to the correct destination.  Every form will require an appointment however we can fill out multiple forms at once if needed.  Please do not hesitate to reach out with any questions or concerns about this policy.  We are trying to make sure that we have a system in place to meet this need which works for everyone involved.  Thank you for your understanding.

## 2023-08-18 LAB
CLOBAZAM SERPL-MCNC: 268 NG/ML (ref 30–300)
LAMOTRIGINE SERPL-MCNC: 3.9 UG/ML (ref 2–15)
NORCLOBAZAM SERPL-MCNC: ABNORMAL NG/ML (ref 300–3000)

## 2023-09-05 ENCOUNTER — PATIENT MESSAGE (OUTPATIENT)
Dept: NEUROLOGY | Facility: CLINIC | Age: 38
End: 2023-09-05
Payer: MEDICARE

## 2023-09-05 ENCOUNTER — TELEPHONE (OUTPATIENT)
Dept: NEUROLOGY | Facility: CLINIC | Age: 38
End: 2023-09-05
Payer: MEDICARE

## 2023-09-05 NOTE — LETTER
September 5, 2023      [facility] [address] [facility]  [address]  [facility] [address]  Phone: [phone]  Fax: [phone]       Patient: Mary Haywood   YOB: 1985  Date of Visit: 09/05/2023    To Whom It May Concern:    Irma Haywood is under my care for a neurological condition that has been refractory to medical and surgical management.  Although we are working together to find an effective treatment strategy to keep her condition manageable, there are times when her condition becomes exacerbated and she is effectively incapacitated.  During these times, she will not be able to assist her daughter getting to school and and will not be able to manage disciplinary or behavioral issues.  In extreme circumstances, she may require her daughter's assistance seeking medical care or other support during emergencies.  Please allow her daughter to be excused from school on these days.    We appreciate your assistance with this challenging situation.  Please do not hesitate to reach out with any questions or concerns.    Sincerely,          Charmaine Hennessy MD PhD [facility]  Neurology-Epilepsy  [facility].

## 2023-09-05 NOTE — TELEPHONE ENCOUNTER
Letter requesting excused absences for her high school age daughter when the patient is incapacitated by seizures.    Charmaine Hennessy MD PhD FACNS  Neurology-Epilepsy  [facility].       ----- Message from Layla Darling MA sent at 9/5/2023  8:34 AM CDT -----  Good morning,  Can you write a letter for this patient's caregiver ? Dr. Hennessy will be back tomorrow.  Layla HUNT  ----- Message -----  From: Kaitlin Peralta  Sent: 9/5/2023   8:23 AM CDT  To: [facility] Staff    Name of Who is Calling: JORDAN CARRASQUILLO [[id number]]            What is the request in detail: Patient is requesting call back seizures got worst and need email sent to school for Cookie Smith being if she miss school its excused for being a caregiver to mother no specific date    Email: [email or web address]  Subject line Cookie Smith      Can the clinic reply by [facility]: no              What Number to [facility] Back [facility]: [phone]

## 2023-09-07 ENCOUNTER — PATIENT MESSAGE (OUTPATIENT)
Dept: NEUROLOGY | Facility: CLINIC | Age: 38
End: 2023-09-07
Payer: MEDICARE

## 2023-09-07 ENCOUNTER — TELEPHONE (OUTPATIENT)
Dept: NEUROLOGY | Facility: CLINIC | Age: 38
End: 2023-09-07
Payer: MEDICARE

## 2023-09-07 NOTE — TELEPHONE ENCOUNTER
Patient portal message sent asking for additional information.    Charmaine Hennessy MD PhD FACNS  Neurology-Epilepsy  [facility].    ----- Message from Layla Darling MA sent at 9/7/2023  1:52 PM CDT -----  Regarding: FW: Lab orders    ----- Message -----  From: Yoel Bernstein  Sent: 9/7/2023   1:51 PM CDT  To: [facility] Staff  Subject: Lab orders                                       Type:  Needs Medical Advice    Who Called: Pt      Would the patient rather a call back or a response via [facility]? Call back    Best Call Back Number: [phone]      Additional Information: Sts pt wants to know if Dr. Hennessy can order her a CMP lab for her to maintain disability benefits. Please advise -- Thank you

## 2024-01-18 ENCOUNTER — TELEPHONE (OUTPATIENT)
Dept: NEUROLOGY | Facility: CLINIC | Age: 39
End: 2024-01-18
Payer: MEDICARE

## 2024-01-18 DIAGNOSIS — F43.10 PTSD (POST-TRAUMATIC STRESS DISORDER): ICD-10-CM

## 2024-01-18 RX ORDER — SERTRALINE HYDROCHLORIDE 100 MG/1
100 TABLET, FILM COATED ORAL NIGHTLY
Qty: 90 TABLET | Refills: 3 | Status: SHIPPED | OUTPATIENT
Start: 2024-01-18 | End: 2024-03-25

## 2024-01-18 NOTE — TELEPHONE ENCOUNTER
Sertraline 100 mg nightly    Charmaine Hennessy MD PhD FACNS  Neurology-Epilepsy  [facility].      ----- Message from Kamran Oro sent at 1/18/2024 11:48 AM CST -----  Can the clinic reply in [facility]: no            Please refill the medication(s) listed below. Please call the patient when the prescription(s) is ready for  at this phone number   Telephone Information:  Mobile          [phone]               Medication #1 sertraline (ZOLOFT) 100 MG tablet                 Preferred Pharmacy:     [id number] - [place], [place] - [address] [address]  Phone: [phone] Fax: [phone]

## 2024-01-30 ENCOUNTER — PATIENT MESSAGE (OUTPATIENT)
Dept: NEUROLOGY | Facility: CLINIC | Age: 39
End: 2024-01-30
Payer: MEDICARE

## 2024-02-09 DIAGNOSIS — G40.219 COMPLEX PARTIAL EPILEPSY WITH GENERALIZATION AND WITH INTRACTABLE EPILEPSY: ICD-10-CM

## 2024-02-09 RX ORDER — CLOBAZAM 20 MG/1
40 TABLET ORAL NIGHTLY
Qty: 60 TABLET | Refills: 5 | Status: SHIPPED | OUTPATIENT
Start: 2024-02-09 | End: 2024-08-07

## 2024-03-25 ENCOUNTER — OFFICE VISIT (OUTPATIENT)
Dept: NEUROLOGY | Facility: CLINIC | Age: 39
End: 2024-03-25
Payer: MEDICARE

## 2024-03-25 ENCOUNTER — LAB VISIT (OUTPATIENT)
Dept: LAB | Facility: HOSPITAL | Age: 39
End: 2024-03-25
Payer: MEDICARE

## 2024-03-25 ENCOUNTER — PATIENT MESSAGE (OUTPATIENT)
Dept: NEUROLOGY | Facility: CLINIC | Age: 39
End: 2024-03-25

## 2024-03-25 VITALS
BODY MASS INDEX: 22.57 KG/M2 | HEART RATE: 57 BPM | HEIGHT: 65 IN | DIASTOLIC BLOOD PRESSURE: 59 MMHG | WEIGHT: 135.5 LBS | SYSTOLIC BLOOD PRESSURE: 93 MMHG

## 2024-03-25 DIAGNOSIS — R53.83 FATIGUE, UNSPECIFIED TYPE: ICD-10-CM

## 2024-03-25 DIAGNOSIS — G40.219 COMPLEX PARTIAL EPILEPSY WITH GENERALIZATION AND WITH INTRACTABLE EPILEPSY: Primary | ICD-10-CM

## 2024-03-25 DIAGNOSIS — F32.A ANXIETY AND DEPRESSION: ICD-10-CM

## 2024-03-25 DIAGNOSIS — R45.84 ANHEDONIA: ICD-10-CM

## 2024-03-25 DIAGNOSIS — G40.219 COMPLEX PARTIAL EPILEPSY WITH GENERALIZATION AND WITH INTRACTABLE EPILEPSY: ICD-10-CM

## 2024-03-25 DIAGNOSIS — Z98.890 HISTORY OF BRAIN SURGERY: ICD-10-CM

## 2024-03-25 DIAGNOSIS — E55.9 VITAMIN D DEFICIENCY: ICD-10-CM

## 2024-03-25 DIAGNOSIS — E53.8 FOLIC ACID DEFICIENCY: ICD-10-CM

## 2024-03-25 DIAGNOSIS — E34.9 HORMONAL DISORDER: ICD-10-CM

## 2024-03-25 DIAGNOSIS — E46 PROTEIN-CALORIE MALNUTRITION, UNSPECIFIED SEVERITY: ICD-10-CM

## 2024-03-25 DIAGNOSIS — Z63.8 FAMILY CONFLICT: ICD-10-CM

## 2024-03-25 DIAGNOSIS — Z79.899 OTHER LONG TERM (CURRENT) DRUG THERAPY: ICD-10-CM

## 2024-03-25 DIAGNOSIS — R63.0 DECREASED APPETITE: ICD-10-CM

## 2024-03-25 DIAGNOSIS — Z90.710 H/O VAGINAL HYSTERECTOMY: ICD-10-CM

## 2024-03-25 DIAGNOSIS — D18.00 CAVERNOMA: ICD-10-CM

## 2024-03-25 DIAGNOSIS — F41.9 ANXIETY AND DEPRESSION: ICD-10-CM

## 2024-03-25 DIAGNOSIS — F43.10 PTSD (POST-TRAUMATIC STRESS DISORDER): ICD-10-CM

## 2024-03-25 LAB
25(OH)D3+25(OH)D2 SERPL-MCNC: 6 NG/ML (ref 30–96)
ALBUMIN SERPL BCP-MCNC: 3.9 G/DL (ref 3.5–5.2)
ALP SERPL-CCNC: 82 U/L (ref 55–135)
ALT SERPL W/O P-5'-P-CCNC: 9 U/L (ref 10–44)
ANION GAP SERPL CALC-SCNC: 6 MMOL/L (ref 8–16)
AST SERPL-CCNC: 11 U/L (ref 10–40)
BASOPHILS # BLD AUTO: 0.04 K/UL (ref 0–0.2)
BASOPHILS NFR BLD: 0.7 % (ref 0–1.9)
BILIRUB SERPL-MCNC: 0.2 MG/DL (ref 0.1–1)
BUN SERPL-MCNC: 7 MG/DL (ref 6–20)
CALCIUM SERPL-MCNC: 8.9 MG/DL (ref 8.7–10.5)
CHLORIDE SERPL-SCNC: 107 MMOL/L (ref 95–110)
CO2 SERPL-SCNC: 26 MMOL/L (ref 23–29)
CREAT SERPL-MCNC: 0.7 MG/DL (ref 0.5–1.4)
DIFFERENTIAL METHOD BLD: ABNORMAL
EOSINOPHIL # BLD AUTO: 0.1 K/UL (ref 0–0.5)
EOSINOPHIL NFR BLD: 2 % (ref 0–8)
ERYTHROCYTE [DISTWIDTH] IN BLOOD BY AUTOMATED COUNT: 13.2 % (ref 11.5–14.5)
EST. GFR  (NO RACE VARIABLE): >60 ML/MIN/1.73 M^2
FOLATE SERPL-MCNC: 2.3 NG/ML (ref 4–24)
GLUCOSE SERPL-MCNC: 76 MG/DL (ref 70–110)
HCT VFR BLD AUTO: 39.7 % (ref 37–48.5)
HGB BLD-MCNC: 13.3 G/DL (ref 12–16)
IMM GRANULOCYTES # BLD AUTO: 0.01 K/UL (ref 0–0.04)
IMM GRANULOCYTES NFR BLD AUTO: 0.2 % (ref 0–0.5)
LYMPHOCYTES # BLD AUTO: 2.5 K/UL (ref 1–4.8)
LYMPHOCYTES NFR BLD: 45.4 % (ref 18–48)
MCH RBC QN AUTO: 34.3 PG (ref 27–31)
MCHC RBC AUTO-ENTMCNC: 33.5 G/DL (ref 32–36)
MCV RBC AUTO: 102 FL (ref 82–98)
MONOCYTES # BLD AUTO: 0.4 K/UL (ref 0.3–1)
MONOCYTES NFR BLD: 7.3 % (ref 4–15)
NEUTROPHILS # BLD AUTO: 2.5 K/UL (ref 1.8–7.7)
NEUTROPHILS NFR BLD: 44.4 % (ref 38–73)
NRBC BLD-RTO: 0 /100 WBC
PLATELET # BLD AUTO: 198 K/UL (ref 150–450)
PMV BLD AUTO: 11.3 FL (ref 9.2–12.9)
POTASSIUM SERPL-SCNC: 3.6 MMOL/L (ref 3.5–5.1)
PROT SERPL-MCNC: 6 G/DL (ref 6–8.4)
RBC # BLD AUTO: 3.88 M/UL (ref 4–5.4)
SODIUM SERPL-SCNC: 139 MMOL/L (ref 136–145)
TESTOST SERPL-MCNC: 31 NG/DL (ref 5–73)
VIT B12 SERPL-MCNC: 248 PG/ML (ref 210–950)
WBC # BLD AUTO: 5.51 K/UL (ref 3.9–12.7)

## 2024-03-25 PROCEDURE — 84403 ASSAY OF TOTAL TESTOSTERONE: CPT

## 2024-03-25 PROCEDURE — 82306 VITAMIN D 25 HYDROXY: CPT | Performed by: PSYCHIATRY & NEUROLOGY

## 2024-03-25 PROCEDURE — 1159F MED LIST DOCD IN RCRD: CPT | Mod: CPTII,S$GLB,,

## 2024-03-25 PROCEDURE — 85025 COMPLETE CBC W/AUTO DIFF WBC: CPT

## 2024-03-25 PROCEDURE — 82746 ASSAY OF FOLIC ACID SERUM: CPT

## 2024-03-25 PROCEDURE — 36415 COLL VENOUS BLD VENIPUNCTURE: CPT

## 2024-03-25 PROCEDURE — 80175 DRUG SCREEN QUAN LAMOTRIGINE: CPT

## 2024-03-25 PROCEDURE — 3078F DIAST BP <80 MM HG: CPT | Mod: CPTII,S$GLB,,

## 2024-03-25 PROCEDURE — 3074F SYST BP LT 130 MM HG: CPT | Mod: CPTII,S$GLB,,

## 2024-03-25 PROCEDURE — 82607 VITAMIN B-12: CPT

## 2024-03-25 PROCEDURE — 3008F BODY MASS INDEX DOCD: CPT | Mod: CPTII,S$GLB,,

## 2024-03-25 PROCEDURE — 82672 ASSAY OF ESTROGEN: CPT

## 2024-03-25 PROCEDURE — 99215 OFFICE O/P EST HI 40 MIN: CPT | Mod: S$GLB,,,

## 2024-03-25 PROCEDURE — 80053 COMPREHEN METABOLIC PANEL: CPT

## 2024-03-25 PROCEDURE — 80339 ANTIEPILEPTICS NOS 1-3: CPT

## 2024-03-25 PROCEDURE — 99999 PR PBB SHADOW E&M-EST. PATIENT-LVL III: CPT | Mod: PBBFAC,,,

## 2024-03-25 RX ORDER — FOLIC ACID 1 MG/1
1 TABLET ORAL DAILY
Qty: 90 TABLET | Refills: 3 | Status: SHIPPED | OUTPATIENT
Start: 2024-03-25 | End: 2025-03-25

## 2024-03-25 RX ORDER — LAMOTRIGINE 150 MG/1
600 TABLET ORAL NIGHTLY
Qty: 360 TABLET | Refills: 3 | Status: SHIPPED | OUTPATIENT
Start: 2024-03-25 | End: 2025-03-20

## 2024-03-25 RX ORDER — SERTRALINE HYDROCHLORIDE 100 MG/1
200 TABLET, FILM COATED ORAL NIGHTLY
Qty: 180 TABLET | Refills: 3 | Status: SHIPPED | OUTPATIENT
Start: 2024-03-25 | End: 2025-03-20

## 2024-03-25 RX ORDER — ERGOCALCIFEROL 1.25 MG/1
50000 CAPSULE ORAL
Qty: 4 CAPSULE | Refills: 1 | Status: SHIPPED | OUTPATIENT
Start: 2024-03-25

## 2024-03-25 SDOH — SOCIAL DETERMINANTS OF HEALTH (SDOH): OTHER SPECIFIED PROBLEMS RELATED TO PRIMARY SUPPORT GROUP: Z63.8

## 2024-03-25 NOTE — PATIENT INSTRUCTIONS
You came to [facility] because of your seizure disorder. Please continue the same medications at the same dose.     Here is the plan we discussed today:  - increase sertraline to 200mg once nightly   - check blood work   - our epilepsy , Saulo, who is also a licensed therapist will reach out to you over the portal to schedule a virtual visit     Do not miss any doses of medication. If a dose of medication is missed, take it as soon as it is remembered even if that means doubling up on the dose. Please get a lab test to check out the blood level of medication.     Get regular sleep. Go to sleep at the same time and wake up at the same time every day. People with epilepsy require more sleep than people without epilepsy.  Sleeping 10-12 hours a day can be normal for a person with epilepsy.  Every seizure makes it harder to prevent the next seizure. Epilepsy is associated with SUDEP, or sudden unexpected death in epilepsy.  The risk is significantly higher if convulsive seizures are not well controlled. For more information, check out these websites: https://www.epilepsy.com/, https://www.epilepsyallianceamerica.org/, www.gómez-epilepsy.org, www.womenandepilepsy.org.  If you are interested in meeting other individuals in our epilepsy community, please reach out to the [facility] [place] ([phone], [phone], info@[email or web address]).  They organize many informative and fun activities in the region.  They can provide you invaluable information on how to get access to resources available for patients living with epilepsy as well as a rich community of like-minded individuals who are all learning to cope with the same issues.  It is very important to remember, you are not alone.     Per [place] law, no episodes of loss of consciousness for 6 months before driving.  Avoid dangerous situations.  For example, no baths/pools alone, no heights, no power tools.  Wear a bike helmet.  If  breakthrough seizures occur that are different in character, frequency, or duration from normal episodes, please patient portal me or call the office and we will decide the next steps. If multiple seizures occur in a row without return back to baseline, 911 needs to be called.     Return to clinic in 3 months or sooner with issues.  Please patient portal with any questions or concerns.    Zelda Hawkins PA-C   Neurology-Epilepsy  [facility]    Forms/Letters/Disability/DMV Paperwork: We understand the importance of filling out forms and providing letters in a timely manner.  However, many of these forms have very tricky language and once an official form is submitted as part of the medical record, it can not be modified or erased.  Please work with us in order to get these forms filled out in the most complete, accurate, and efficient way. 1.  Once you are aware that a form will need to be completed, please make an appointment.  A virtual appointment with Dr. Hennessy or Flores is perfectly fine. 2.  Please fill out the form as much as you can.  There are many questions that we do not have an answer for.  Please bring a blank copy of the form and your partially filled out form to the clinic visit or send them to us over the portal.  We will complete the form together with you during the clinic visit, sign it, and either return it to you or send it to the correct destination.  Every form will require an appointment however we can fill out multiple forms at once if needed.  Please do not hesitate to reach out with any questions or concerns about this policy.  We are trying to make sure that we have a system in place to meet this need which works for everyone involved.  Thank you for your understanding.

## 2024-03-25 NOTE — PROGRESS NOTES
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 signed by resident: Nikita Hamilton  Date:    10/23/2020  Time:    11:18    Electronically signed by: Karthik Kelley MD  Date:    10/23/2020  Time:    11:58    Results for orders placed during the hospital encounter of 10/13/20    NM PET Brain FDG    Impression  Decreased uptake in the right temporal lobe by visual inspection and more focally decreased uptake at the right temporal cavernous malformation by quantitative analysis compatible with interictal epileptogenic focus.    I, Ashish Luna MD, attest that I reviewed and interpreted the images.    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    H/O vaginal hysterectomy 07/16/2020    Hormonal disorder 07/16/2020    Partial symptomatic epilepsy with complex partial seizures,  intractable, with status epilepticus     Mild 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 10/22/2020    Intractable epilepsy 10/23/2020    Postural dizziness 10/23/2020    Currently smokes tobacco 10/23/2020    Constipation 10/23/2020    Hyperglycemia 10/23/2020    Hyponatremia 10/23/2020    Palpable abdominal aorta 10/23/2020    Macrocytosis without anemia 10/23/2020    Cavernoma 11/09/2020    Tobacco use 11/09/2020    History of brain surgery 04/05/2021    Family conflict 11/03/2022    Muscle spasm 11/03/2022     Resolved Ambulatory Problems     Diagnosis Date Noted    No Resolved Ambulatory Problems     Past Medical History:   Diagnosis Date    ADHD (attention deficit hyperactivity disorder)     Neuromuscular disorder     Overdose of illicit drug         PAST SURGICAL HISTORY:   Past Surgical History:   Procedure Laterality Date    CRANIOTOMY USING FRAMELESS STEREOTAXY Right 11/9/2020    Procedure: CRANIOTOMY, USING FRAMELESS STEREOTAXY FOR  RIGHT SIDED RESECTION OF CAVERNOMA;  Surgeon: Nasra Wetzel MD;  Location: [facility] OR [address] [place] [address];  Service: Neurosurgery;  Laterality: Right;  TORONTO II, ASA II, BLOOD TYPE & CROSS 2 UNITS, HEADREST [place], REGULAR BED, SUPINE    HYSTERECTOMY  2011    endometriosis    OOPHORECTOMY  2011    ovarian cyst    wisdom teeth removal          ALLERGIES: Patient has no known allergies.   CURRENT MEDICATIONS:   Current Outpatient Medications   Medication Sig Dispense Refill    cloBAZam (ONFI) 20 mg Tab Take 2 tablets (40 mg total) by mouth every evening. 60 tablet 5    lamoTRIgine (LAMICTAL) 150 MG Tab TAKE 2 TABLETS BY MOUTH 2 TIMES DAILY. 360 tablet 3    sertraline (ZOLOFT) 100 MG tablet Take 1 tablet (100 mg total) by mouth every evening. 90 tablet 3     No current facility-administered medications for this visit.        FAMILY HISTORY:   Family History   Adopted: Yes   Family history unknown: Yes         SOCIAL HISTORY:   Social History     Socioeconomic History     Marital status: Single   Occupational History    Occupation: disability   Tobacco Use    Smoking status: Every Day     Current packs/day: 1.00     Average packs/day: 1 pack/day for 25.7 years (25.7 ttl pk-yrs)     Types: Cigarettes     Start date: 7/29/1998    Smokeless tobacco: Never   Substance and Sexual Activity    Alcohol use: Yes     Comment: once a month    Drug use: Yes     Types: Marijuana    Sexual activity: Not Currently     Partners: Male     Birth control/protection: See Surgical Hx, Surgical     Comment:    Social History Narrative    Live alone     Social Determinants of Health     Stress: Stress Concern Present (12/3/2019)    [facility] [place] - Occupational Stress Questionnaire     Feeling of Stress : To some extent      Questions and concerns raised by the patient and family/care-giver(s) were addressed and they indicated understanding of everything discussed and agreed to plans as above.    Zelda Hawkins PA-C   Neurology-Epilepsy  [facility]    Collaborating physician, Dr. Charmaine Hennessy, was available during today's encounter.     I spent approximately 55 minutes on the day of this encounter preparing to see the patient, obtaining and reviewing history and results, performing a medically appropriate exam, counseling and educating the patient/family/caregiver, documenting clinical information, coordinating care, and ordering medications, tests, procedures, and referrals.

## 2024-03-26 LAB
CLOBAZAM SERPL-MCNC: 206 NG/ML (ref 30–300)
NORCLOBAZAM SERPL-MCNC: ABNORMAL NG/ML (ref 300–3000)

## 2024-03-28 LAB
ESTROGEN SERPL-MCNC: 171 PG/ML
LAMOTRIGINE SERPL-MCNC: 2.8 UG/ML (ref 2–15)

## 2024-04-08 ENCOUNTER — PATIENT MESSAGE (OUTPATIENT)
Dept: NEUROLOGY | Facility: CLINIC | Age: 39
End: 2024-04-08
Payer: MEDICARE

## 2024-06-13 ENCOUNTER — PATIENT MESSAGE (OUTPATIENT)
Dept: NEUROLOGY | Facility: CLINIC | Age: 39
End: 2024-06-13
Payer: MEDICARE

## 2024-06-16 DIAGNOSIS — G47.00 INSOMNIA, UNSPECIFIED TYPE: Primary | ICD-10-CM

## 2024-06-16 RX ORDER — TRAZODONE HYDROCHLORIDE 50 MG/1
50 TABLET ORAL NIGHTLY
Qty: 30 TABLET | Refills: 11 | Status: SHIPPED | OUTPATIENT
Start: 2024-06-16 | End: 2025-06-16

## 2024-07-08 ENCOUNTER — PATIENT MESSAGE (OUTPATIENT)
Dept: NEUROLOGY | Facility: CLINIC | Age: 39
End: 2024-07-08
Payer: MEDICARE

## 2024-07-11 ENCOUNTER — PATIENT MESSAGE (OUTPATIENT)
Dept: NEUROLOGY | Facility: CLINIC | Age: 39
End: 2024-07-11
Payer: MEDICARE

## 2024-07-16 ENCOUNTER — PATIENT MESSAGE (OUTPATIENT)
Dept: NEUROLOGY | Facility: CLINIC | Age: 39
End: 2024-07-16
Payer: MEDICARE

## 2024-08-07 DIAGNOSIS — G40.219 COMPLEX PARTIAL EPILEPSY WITH GENERALIZATION AND WITH INTRACTABLE EPILEPSY: ICD-10-CM

## 2024-08-08 RX ORDER — CLOBAZAM 20 MG/1
40 TABLET ORAL NIGHTLY
Qty: 60 TABLET | Refills: 5 | Status: SHIPPED | OUTPATIENT
Start: 2024-08-08 | End: 2025-02-04

## 2024-08-12 ENCOUNTER — PATIENT MESSAGE (OUTPATIENT)
Dept: NEUROLOGY | Facility: CLINIC | Age: 39
End: 2024-08-12
Payer: MEDICARE

## 2024-08-20 ENCOUNTER — TELEPHONE (OUTPATIENT)
Dept: NEUROLOGY | Facility: CLINIC | Age: 39
End: 2024-08-20
Payer: MEDICARE

## 2024-08-20 NOTE — TELEPHONE ENCOUNTER
Gave the pt a call to get her on the schedule to get re-established with Dr. FIELD. Pt stated she could not come today due to her transportation issues. Pt stated for me to call her Ariel, Her father to schedule any appointment on her behalf because he is her ride to and from and he works. Gave ariel a call after hanging up with ragini and his phone went straight to [employer]. Left a message for him to call us back. Will reach out again later to try to get this pt scheduled.

## 2024-09-08 DIAGNOSIS — F43.10 PTSD (POST-TRAUMATIC STRESS DISORDER): ICD-10-CM

## 2024-09-08 DIAGNOSIS — G40.219 COMPLEX PARTIAL EPILEPSY WITH GENERALIZATION AND WITH INTRACTABLE EPILEPSY: ICD-10-CM

## 2024-09-09 RX ORDER — LAMOTRIGINE 150 MG/1
600 TABLET ORAL NIGHTLY
Qty: 360 TABLET | Refills: 3 | Status: SHIPPED | OUTPATIENT
Start: 2024-09-09 | End: 2025-09-04

## 2024-09-09 RX ORDER — CLOBAZAM 20 MG/1
40 TABLET ORAL NIGHTLY
Qty: 60 TABLET | Refills: 5 | Status: SHIPPED | OUTPATIENT
Start: 2024-09-09 | End: 2025-03-08

## 2024-09-09 RX ORDER — SERTRALINE HYDROCHLORIDE 100 MG/1
200 TABLET, FILM COATED ORAL NIGHTLY
Qty: 180 TABLET | Refills: 3 | Status: SHIPPED | OUTPATIENT
Start: 2024-09-09 | End: 2025-09-04

## 2024-11-15 ENCOUNTER — OFFICE VISIT (OUTPATIENT)
Dept: NEUROLOGY | Facility: CLINIC | Age: 39
End: 2024-11-15
Payer: MEDICARE

## 2024-11-15 ENCOUNTER — LAB VISIT (OUTPATIENT)
Dept: LAB | Facility: HOSPITAL | Age: 39
End: 2024-11-15
Attending: STUDENT IN AN ORGANIZED HEALTH CARE EDUCATION/TRAINING PROGRAM
Payer: MEDICARE

## 2024-11-15 VITALS
BODY MASS INDEX: 22.53 KG/M2 | SYSTOLIC BLOOD PRESSURE: 103 MMHG | HEART RATE: 87 BPM | DIASTOLIC BLOOD PRESSURE: 60 MMHG | WEIGHT: 135.38 LBS

## 2024-11-15 DIAGNOSIS — G40.909 SEIZURE DISORDER: ICD-10-CM

## 2024-11-15 DIAGNOSIS — E53.8 FOLIC ACID DEFICIENCY: ICD-10-CM

## 2024-11-15 DIAGNOSIS — R41.3 MEMORY CHANGE: ICD-10-CM

## 2024-11-15 DIAGNOSIS — G40.909 SEIZURE DISORDER: Primary | ICD-10-CM

## 2024-11-15 LAB
ALBUMIN SERPL BCP-MCNC: 4.1 G/DL (ref 3.5–5.2)
ALP SERPL-CCNC: 87 U/L (ref 40–150)
ALT SERPL W/O P-5'-P-CCNC: 21 U/L (ref 10–44)
ANION GAP SERPL CALC-SCNC: 6 MMOL/L (ref 8–16)
AST SERPL-CCNC: 20 U/L (ref 10–40)
BASOPHILS # BLD AUTO: 0.05 K/UL (ref 0–0.2)
BASOPHILS NFR BLD: 0.6 % (ref 0–1.9)
BILIRUB SERPL-MCNC: 0.4 MG/DL (ref 0.1–1)
BUN SERPL-MCNC: 11 MG/DL (ref 6–20)
CALCIUM SERPL-MCNC: 9 MG/DL (ref 8.7–10.5)
CHLORIDE SERPL-SCNC: 107 MMOL/L (ref 95–110)
CO2 SERPL-SCNC: 25 MMOL/L (ref 23–29)
CREAT SERPL-MCNC: 0.8 MG/DL (ref 0.5–1.4)
DIFFERENTIAL METHOD BLD: ABNORMAL
EOSINOPHIL # BLD AUTO: 0.1 K/UL (ref 0–0.5)
EOSINOPHIL NFR BLD: 1.6 % (ref 0–8)
ERYTHROCYTE [DISTWIDTH] IN BLOOD BY AUTOMATED COUNT: 12.5 % (ref 11.5–14.5)
EST. GFR  (NO RACE VARIABLE): >60 ML/MIN/1.73 M^2
FOLATE SERPL-MCNC: 7.6 NG/ML (ref 4–24)
GLUCOSE SERPL-MCNC: 75 MG/DL (ref 70–110)
HCT VFR BLD AUTO: 43.4 % (ref 37–48.5)
HGB BLD-MCNC: 14.6 G/DL (ref 12–16)
IMM GRANULOCYTES # BLD AUTO: 0.01 K/UL (ref 0–0.04)
IMM GRANULOCYTES NFR BLD AUTO: 0.1 % (ref 0–0.5)
LYMPHOCYTES # BLD AUTO: 2.1 K/UL (ref 1–4.8)
LYMPHOCYTES NFR BLD: 24.2 % (ref 18–48)
MCH RBC QN AUTO: 33 PG (ref 27–31)
MCHC RBC AUTO-ENTMCNC: 33.6 G/DL (ref 32–36)
MCV RBC AUTO: 98 FL (ref 82–98)
MONOCYTES # BLD AUTO: 0.8 K/UL (ref 0.3–1)
MONOCYTES NFR BLD: 8.6 % (ref 4–15)
NEUTROPHILS # BLD AUTO: 5.7 K/UL (ref 1.8–7.7)
NEUTROPHILS NFR BLD: 64.9 % (ref 38–73)
NRBC BLD-RTO: 0 /100 WBC
PLATELET # BLD AUTO: 233 K/UL (ref 150–450)
PMV BLD AUTO: 10.6 FL (ref 9.2–12.9)
POTASSIUM SERPL-SCNC: 3.4 MMOL/L (ref 3.5–5.1)
PROT SERPL-MCNC: 7 G/DL (ref 6–8.4)
RBC # BLD AUTO: 4.43 M/UL (ref 4–5.4)
SODIUM SERPL-SCNC: 138 MMOL/L (ref 136–145)
TSH SERPL DL<=0.005 MIU/L-ACNC: 1.56 UIU/ML (ref 0.4–4)
VIT B12 SERPL-MCNC: 307 PG/ML (ref 210–950)
WBC # BLD AUTO: 8.77 K/UL (ref 3.9–12.7)

## 2024-11-15 PROCEDURE — 84443 ASSAY THYROID STIM HORMONE: CPT | Performed by: STUDENT IN AN ORGANIZED HEALTH CARE EDUCATION/TRAINING PROGRAM

## 2024-11-15 PROCEDURE — 82607 VITAMIN B-12: CPT | Performed by: STUDENT IN AN ORGANIZED HEALTH CARE EDUCATION/TRAINING PROGRAM

## 2024-11-15 PROCEDURE — 80175 DRUG SCREEN QUAN LAMOTRIGINE: CPT | Performed by: STUDENT IN AN ORGANIZED HEALTH CARE EDUCATION/TRAINING PROGRAM

## 2024-11-15 PROCEDURE — 80053 COMPREHEN METABOLIC PANEL: CPT | Performed by: STUDENT IN AN ORGANIZED HEALTH CARE EDUCATION/TRAINING PROGRAM

## 2024-11-15 PROCEDURE — 85025 COMPLETE CBC W/AUTO DIFF WBC: CPT | Performed by: STUDENT IN AN ORGANIZED HEALTH CARE EDUCATION/TRAINING PROGRAM

## 2024-11-15 PROCEDURE — 99999 PR PBB SHADOW E&M-EST. PATIENT-LVL III: CPT | Mod: PBBFAC,,, | Performed by: STUDENT IN AN ORGANIZED HEALTH CARE EDUCATION/TRAINING PROGRAM

## 2024-11-15 PROCEDURE — 82746 ASSAY OF FOLIC ACID SERUM: CPT | Performed by: STUDENT IN AN ORGANIZED HEALTH CARE EDUCATION/TRAINING PROGRAM

## 2024-11-15 PROCEDURE — 80339 ANTIEPILEPTICS NOS 1-3: CPT | Performed by: STUDENT IN AN ORGANIZED HEALTH CARE EDUCATION/TRAINING PROGRAM

## 2024-11-15 RX ORDER — FLUOXETINE HYDROCHLORIDE 20 MG/1
20 CAPSULE ORAL DAILY
Qty: 60 CAPSULE | Refills: 5 | Status: SHIPPED | OUTPATIENT
Start: 2024-11-15 | End: 2025-11-15
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
## 2024-11-15 NOTE — PATIENT INSTRUCTIONS
VISIT FOLLOW UP    It was nice to see you today.  Here is what we discussed at your visit:    Continue lamotrigine 600 mg daily and clobazam 40 mg daily at this time.  Start prozac 20 mg (1 tab) daily for anxiety.  Please send me a message in about 4-6 weeks with how the prozac is working for you.   Blood work today to check levels and basic labs.  Follow up in February 2025.     Dr. FIELD's contact information: office phone [phone], or contact via [employer]    Seizure precautions:    For emergencies, please call 911 or proceed directly to the nearest emergency room only if you can safely do so.    Seizures may happen at any time. It is important to take certain precautions to maintain your safety.     You should not drive unless you have been cleared by your physicians as well as the [facility].     When possible, take showers instead of baths, as it is possible to drown in even shallow water during a seizure. Do not swim unsupervised or in open water where rescue could be difficult. Do not climb to heights and do not operate heavy machinery. When cooking, use the back burners of the stove and avoid open flames or hot stove tops. Avoid any activities which could be dangerous in the event of a loss of consciousness.

## 2024-11-18 LAB — LAMOTRIGINE SERPL-MCNC: 3.7 UG/ML (ref 2–15)

## 2024-11-19 LAB
CLOBAZAM SERPL-MCNC: 27.2 NG/ML (ref 30–300)
NORCLOBAZAM SERPL-MCNC: ABNORMAL NG/ML (ref 300–3000)

## 2024-11-22 ENCOUNTER — PATIENT MESSAGE (OUTPATIENT)
Dept: NEUROLOGY | Facility: CLINIC | Age: 39
End: 2024-11-22
Payer: MEDICARE

## 2024-11-26 ENCOUNTER — PATIENT MESSAGE (OUTPATIENT)
Dept: NEUROLOGY | Facility: CLINIC | Age: 39
End: 2024-11-26
Payer: MEDICARE

## 2024-11-26 DIAGNOSIS — G40.219 COMPLEX PARTIAL EPILEPSY WITH GENERALIZATION AND WITH INTRACTABLE EPILEPSY: ICD-10-CM

## 2024-11-27 RX ORDER — LAMOTRIGINE 150 MG/1
750 TABLET ORAL NIGHTLY
Qty: 450 TABLET | Refills: 3 | Status: SHIPPED | OUTPATIENT
Start: 2024-11-27 | End: 2025-11-22

## 2024-12-23 ENCOUNTER — PATIENT MESSAGE (OUTPATIENT)
Dept: NEUROLOGY | Facility: CLINIC | Age: 39
End: 2024-12-23
Payer: MEDICARE

## 2025-02-10 ENCOUNTER — OFFICE VISIT (OUTPATIENT)
Dept: NEUROLOGY | Facility: CLINIC | Age: 40
End: 2025-02-10
Payer: MEDICARE

## 2025-02-10 VITALS
WEIGHT: 132.69 LBS | SYSTOLIC BLOOD PRESSURE: 83 MMHG | HEART RATE: 69 BPM | DIASTOLIC BLOOD PRESSURE: 49 MMHG | BODY MASS INDEX: 22.11 KG/M2 | HEIGHT: 65 IN

## 2025-02-10 DIAGNOSIS — G40.219 COMPLEX PARTIAL EPILEPSY WITH GENERALIZATION AND WITH INTRACTABLE EPILEPSY: ICD-10-CM

## 2025-02-10 DIAGNOSIS — G40.909 SEIZURE DISORDER: ICD-10-CM

## 2025-02-10 PROCEDURE — 3078F DIAST BP <80 MM HG: CPT | Mod: CPTII,S$GLB,, | Performed by: STUDENT IN AN ORGANIZED HEALTH CARE EDUCATION/TRAINING PROGRAM

## 2025-02-10 PROCEDURE — 99213 OFFICE O/P EST LOW 20 MIN: CPT | Mod: S$GLB,,, | Performed by: STUDENT IN AN ORGANIZED HEALTH CARE EDUCATION/TRAINING PROGRAM

## 2025-02-10 PROCEDURE — 99999 PR PBB SHADOW E&M-EST. PATIENT-LVL III: CPT | Mod: PBBFAC,,, | Performed by: STUDENT IN AN ORGANIZED HEALTH CARE EDUCATION/TRAINING PROGRAM

## 2025-02-10 PROCEDURE — 1159F MED LIST DOCD IN RCRD: CPT | Mod: CPTII,S$GLB,, | Performed by: STUDENT IN AN ORGANIZED HEALTH CARE EDUCATION/TRAINING PROGRAM

## 2025-02-10 PROCEDURE — 3008F BODY MASS INDEX DOCD: CPT | Mod: CPTII,S$GLB,, | Performed by: STUDENT IN AN ORGANIZED HEALTH CARE EDUCATION/TRAINING PROGRAM

## 2025-02-10 PROCEDURE — 1160F RVW MEDS BY RX/DR IN RCRD: CPT | Mod: CPTII,S$GLB,, | Performed by: STUDENT IN AN ORGANIZED HEALTH CARE EDUCATION/TRAINING PROGRAM

## 2025-02-10 PROCEDURE — 3074F SYST BP LT 130 MM HG: CPT | Mod: CPTII,S$GLB,, | Performed by: STUDENT IN AN ORGANIZED HEALTH CARE EDUCATION/TRAINING PROGRAM

## 2025-02-10 RX ORDER — CLOBAZAM 20 MG/1
40 TABLET ORAL NIGHTLY
Qty: 60 TABLET | Refills: 5 | Status: SHIPPED | OUTPATIENT
Start: 2025-02-10 | End: 2025-08-09

## 2025-02-10 RX ORDER — LAMOTRIGINE 150 MG/1
750 TABLET ORAL NIGHTLY
Qty: 450 TABLET | Refills: 3 | Status: SHIPPED | OUTPATIENT
Start: 2025-02-10 | End: 2026-02-05
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
## 2025-02-11 ENCOUNTER — TELEPHONE (OUTPATIENT)
Dept: NEUROSURGERY | Facility: CLINIC | Age: 40
End: 2025-02-11
Payer: MEDICARE

## 2025-02-11 NOTE — TELEPHONE ENCOUNTER
Spoke w pt's dad. Scheduled appt for 3/27 VV - would be interested in earlier if possible     ----- Message from Nasra Wetzel MD sent at 2/10/2025  3:06 PM CST -----  Regarding: RE: Referral  Thanks Kourtney, happy to see her     Roberto, let's give her a one-hour new epilepsy spot. Thanks  ----- Message -----  From: Bryan Ayers MD  Sent: 2/10/2025   2:29 PM CST  To: Nasra Wetzel MD  Subject: Referral                                         Ronn Hammonds,  Have a referral for you.   This patient used to see Dr. Hennessy and had removal of cavernous malformation 11/2020 for her refractory epilepsy.  She did see a significant improvement in seizure frequency after the surgery but still has about 4-5 focal seizures per month.  She is quite adamant however that she does not want another intracranial surgery, but is interested in possible VNS.  I placed a referral to you so you can discuss more with her.  She is hopeful a VNS would also help her depression, which I told her I don't have much experience in VNS for depression but it certainly has been used for that.    Thanks in advance,  Kourtney

## 2025-02-20 ENCOUNTER — TELEPHONE (OUTPATIENT)
Dept: NEUROSURGERY | Facility: CLINIC | Age: 40
End: 2025-02-20
Payer: MEDICARE

## 2025-04-03 ENCOUNTER — OFFICE VISIT (OUTPATIENT)
Dept: NEUROSURGERY | Facility: CLINIC | Age: 40
End: 2025-04-03
Payer: MEDICARE

## 2025-04-03 DIAGNOSIS — G40.219 COMPLEX PARTIAL EPILEPSY WITH GENERALIZATION AND WITH INTRACTABLE EPILEPSY: ICD-10-CM

## 2025-04-03 DIAGNOSIS — G40.219 COMPLEX PARTIAL EPILEPSY WITH GENERALIZATION AND WITH INTRACTABLE EPILEPSY: Primary | ICD-10-CM
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
## 2025-04-04 ENCOUNTER — TELEPHONE (OUTPATIENT)
Dept: NEUROLOGY | Facility: CLINIC | Age: 40
End: 2025-04-04
Payer: MEDICARE

## 2025-04-07 ENCOUNTER — TELEPHONE (OUTPATIENT)
Dept: NEUROLOGY | Facility: CLINIC | Age: 40
End: 2025-04-07
Payer: MEDICARE

## 2025-04-07 DIAGNOSIS — G40.219 COMPLEX PARTIAL EPILEPSY WITH GENERALIZATION AND WITH INTRACTABLE EPILEPSY: Primary | ICD-10-CM

## 2025-04-07 NOTE — TELEPHONE ENCOUNTER
Patient's Dad called and left me a voice mail requesting EMU scheduling. Scheduled 5/12/25, 11am. Aware an adult is required to accompany her for the duration including overnight. Aware she will be connected to lines to her head, chest, arm, have an IV placed; will not be able to leave the room until all equipment is removed at discharge. Instructions thoroughly discussed; mailed via [email or web address]. Reports Mary does smoke/ vape and is aware there is absolutely no smoking or vaping allowed in the hospital and can request a nicotine patch. Denies adhesive allergy.

## 2025-04-10 ENCOUNTER — TELEPHONE (OUTPATIENT)
Dept: NEUROLOGY | Facility: CLINIC | Age: 40
End: 2025-04-10
Payer: MEDICARE

## 2025-04-10 NOTE — TELEPHONE ENCOUNTER
Called patient to discuss an appointment to establish with Dr. Capellan. Patient was confused due to being a patient of Dr. Ayers and not aware that she is leaving [facility]. Patient wanting to make Dr. Capellan would still be able to communicate and discuss her case with Dr. Wetzel and any other provider involved. Assured patient that nothing would change with regards to that. Patient will have her dad call back to discuss since he takes her to all of her appointments and patient has a poor memory.

## 2025-04-10 NOTE — TELEPHONE ENCOUNTER
Returned call regarding message left on my voice mail. I answered her questions about EMU: yes can bring electronics and there is Wi-Fi, there is a place for  to sleep, confirmed MRI [facility] from hospital prior to EMU admission.

## 2025-04-23 ENCOUNTER — TELEPHONE (OUTPATIENT)
Dept: NEUROLOGY | Facility: CLINIC | Age: 40
End: 2025-04-23
Payer: MEDICARE

## 2025-04-29 ENCOUNTER — TELEPHONE (OUTPATIENT)
Dept: NEUROLOGY | Facility: CLINIC | Age: 40
End: 2025-04-29
Payer: MEDICARE

## 2025-04-30 ENCOUNTER — OFFICE VISIT (OUTPATIENT)
Dept: NEUROLOGY | Facility: CLINIC | Age: 40
End: 2025-04-30
Payer: MEDICARE

## 2025-04-30 ENCOUNTER — PATIENT MESSAGE (OUTPATIENT)
Dept: NEUROLOGY | Facility: CLINIC | Age: 40
End: 2025-04-30
Payer: MEDICARE

## 2025-04-30 VITALS
HEART RATE: 51 BPM | WEIGHT: 130.75 LBS | DIASTOLIC BLOOD PRESSURE: 63 MMHG | RESPIRATION RATE: 12 BRPM | BODY MASS INDEX: 21.76 KG/M2 | SYSTOLIC BLOOD PRESSURE: 103 MMHG

## 2025-04-30 DIAGNOSIS — G40.219 COMPLEX PARTIAL EPILEPSY WITH GENERALIZATION AND WITH INTRACTABLE EPILEPSY: ICD-10-CM

## 2025-04-30 PROCEDURE — 1159F MED LIST DOCD IN RCRD: CPT | Mod: CPTII,S$GLB,, | Performed by: PSYCHIATRY & NEUROLOGY

## 2025-04-30 PROCEDURE — 99417 PROLNG OP E/M EACH 15 MIN: CPT | Mod: S$GLB,,, | Performed by: PSYCHIATRY & NEUROLOGY

## 2025-04-30 PROCEDURE — 1160F RVW MEDS BY RX/DR IN RCRD: CPT | Mod: CPTII,S$GLB,, | Performed by: PSYCHIATRY & NEUROLOGY

## 2025-04-30 PROCEDURE — 99999 PR PBB SHADOW E&M-EST. PATIENT-LVL III: CPT | Mod: PBBFAC,,, | Performed by: PSYCHIATRY & NEUROLOGY

## 2025-04-30 PROCEDURE — 3008F BODY MASS INDEX DOCD: CPT | Mod: CPTII,S$GLB,, | Performed by: PSYCHIATRY & NEUROLOGY

## 2025-04-30 PROCEDURE — 99215 OFFICE O/P EST HI 40 MIN: CPT | Mod: S$GLB,,, | Performed by: PSYCHIATRY & NEUROLOGY

## 2025-04-30 PROCEDURE — G2211 COMPLEX E/M VISIT ADD ON: HCPCS | Mod: S$GLB,,, | Performed by: PSYCHIATRY & NEUROLOGY

## 2025-04-30 PROCEDURE — 3078F DIAST BP <80 MM HG: CPT | Mod: CPTII,S$GLB,, | Performed by: PSYCHIATRY & NEUROLOGY

## 2025-04-30 PROCEDURE — 3074F SYST BP LT 130 MM HG: CPT | Mod: CPTII,S$GLB,, | Performed by: PSYCHIATRY & NEUROLOGY

## 2025-04-30 RX ORDER — LAMOTRIGINE 150 MG/1
750 TABLET ORAL NIGHTLY
Qty: 450 TABLET | Refills: 3 | Status: SHIPPED | OUTPATIENT
Start: 2025-04-30 | End: 2026-04-25

## 2025-04-30 RX ORDER — CLOBAZAM 20 MG/1
40 TABLET ORAL NIGHTLY
Qty: 60 TABLET | Refills: 5 | Status: SHIPPED | OUTPATIENT
Start: 2025-04-30 | End: 2025-10-27
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
## 2025-05-01 ENCOUNTER — TELEPHONE (OUTPATIENT)
Dept: NEUROLOGY | Facility: CLINIC | Age: 40
End: 2025-05-01
Payer: MEDICARE

## 2025-05-01 ENCOUNTER — PATIENT MESSAGE (OUTPATIENT)
Dept: NEUROLOGY | Facility: CLINIC | Age: 40
End: 2025-05-01
Payer: MEDICARE

## 2025-05-01 NOTE — TELEPHONE ENCOUNTER
----- Message from Eryn sent at 5/1/2025  9:45 AM CDT -----  Regarding: Needs Medical Advice  Contact: patient at [phone]  Type: Needs Medical AdviceWho Called:  patient at [phone] Information: patient is calling to discuss her medication dosage. Patient was supposed to be taking 5 tablets but was taking 4 before. Last night she took the 5 tablets for the first time and today her eyes are blurry. She doesn't know if this is related to the updated medication. Please call and advise. Thank youlamoTRIgine (LAMICTAL) 150 MG Tab 450 tablet 3 4/30/2025 4/25/2026 Sig - Route: Take 5 tablets (750 mg total) by mouth nightly. - Oral Sent to pharmacy as: lamoTRIgine (LAMICTAL) 150 MG Tab E-Prescribing Status: Receipt confirmed by pharmacy (4/30/2025  3:44 PM CDT)

## 2025-05-12 ENCOUNTER — HOSPITAL ENCOUNTER (OUTPATIENT)
Dept: RADIOLOGY | Facility: HOSPITAL | Age: 40
Discharge: HOME | DRG: 101 | End: 2025-05-12
Attending: NEUROLOGICAL SURGERY | Admitting: PSYCHIATRY & NEUROLOGY
Payer: MEDICARE

## 2025-05-12 ENCOUNTER — HOSPITAL ENCOUNTER (INPATIENT)
Facility: HOSPITAL | Age: 40
LOS: 1 days | Discharge: HOME | DRG: 101 | End: 2025-05-13
Attending: PSYCHIATRY & NEUROLOGY | Admitting: PSYCHIATRY & NEUROLOGY
Payer: MEDICARE

## 2025-05-12 DIAGNOSIS — R56.9 SEIZURE: ICD-10-CM

## 2025-05-12 DIAGNOSIS — G40.219 COMPLEX PARTIAL EPILEPSY WITH GENERALIZATION AND WITH INTRACTABLE EPILEPSY: ICD-10-CM

## 2025-05-12 DIAGNOSIS — F17.200 TOBACCO DEPENDENCY: ICD-10-CM

## 2025-05-12 DIAGNOSIS — F43.10 PTSD (POST-TRAUMATIC STRESS DISORDER): Primary | ICD-10-CM

## 2025-05-12 LAB
ABSOLUTE EOSINOPHIL (OHS): 0.14 K/UL
ABSOLUTE MONOCYTE (OHS): 0.41 K/UL (ref 0.3–1)
ABSOLUTE NEUTROPHIL COUNT (OHS): 3.59 K/UL (ref 1.8–7.7)
ALBUMIN SERPL BCP-MCNC: 3.4 G/DL (ref 3.5–5.2)
ALP SERPL-CCNC: 71 UNIT/L (ref 40–150)
ALT SERPL W/O P-5'-P-CCNC: 11 UNIT/L (ref 10–44)
AMPHET UR QL SCN: NEGATIVE
ANION GAP (OHS): 7 MMOL/L (ref 8–16)
AST SERPL-CCNC: 12 UNIT/L (ref 11–45)
BARBITURATE SCN PRESENT UR: NEGATIVE
BASOPHILS # BLD AUTO: 0.03 K/UL
BASOPHILS NFR BLD AUTO: 0.5 %
BENZODIAZ UR QL SCN: ABNORMAL
BILIRUB SERPL-MCNC: 0.2 MG/DL (ref 0.1–1)
BUN SERPL-MCNC: 8 MG/DL (ref 6–20)
CALCIUM SERPL-MCNC: 8.2 MG/DL (ref 8.7–10.5)
CANNABINOIDS UR QL SCN: ABNORMAL
CHLORIDE SERPL-SCNC: 111 MMOL/L (ref 95–110)
CO2 SERPL-SCNC: 24 MMOL/L (ref 23–29)
COCAINE UR QL SCN: NEGATIVE
CREAT SERPL-MCNC: 0.8 MG/DL (ref 0.5–1.4)
CREAT UR-MCNC: 182 MG/DL (ref 15–325)
ERYTHROCYTE [DISTWIDTH] IN BLOOD BY AUTOMATED COUNT: 14.4 % (ref 11.5–14.5)
GFR SERPLBLD CREATININE-BSD FMLA CKD-EPI: >60 ML/MIN/1.73/M2
GLUCOSE SERPL-MCNC: 77 MG/DL (ref 70–110)
HCT VFR BLD AUTO: 36 % (ref 37–48.5)
HGB BLD-MCNC: 12 GM/DL (ref 12–16)
IMM GRANULOCYTES # BLD AUTO: 0.02 K/UL (ref 0–0.04)
IMM GRANULOCYTES NFR BLD AUTO: 0.3 % (ref 0–0.5)
LYMPHOCYTES # BLD AUTO: 2.05 K/UL (ref 1–4.8)
MCH RBC QN AUTO: 34.4 PG (ref 27–31)
MCHC RBC AUTO-ENTMCNC: 33.3 G/DL (ref 32–36)
MCV RBC AUTO: 103 FL (ref 82–98)
METHADONE UR QL SCN: NEGATIVE
NUCLEATED RBC (/100WBC) (OHS): 0 /100 WBC
OPIATES UR QL SCN: NEGATIVE
PCP UR QL: NEGATIVE
PLATELET # BLD AUTO: 186 K/UL (ref 150–450)
PMV BLD AUTO: 11.2 FL (ref 9.2–12.9)
POTASSIUM SERPL-SCNC: 3.9 MMOL/L (ref 3.5–5.1)
PROT SERPL-MCNC: 5.8 GM/DL (ref 6–8.4)
RBC # BLD AUTO: 3.49 M/UL (ref 4–5.4)
RELATIVE EOSINOPHIL (OHS): 2.2 %
RELATIVE LYMPHOCYTE (OHS): 32.9 % (ref 18–48)
RELATIVE MONOCYTE (OHS): 6.6 % (ref 4–15)
RELATIVE NEUTROPHIL (OHS): 57.5 % (ref 38–73)
SODIUM SERPL-SCNC: 142 MMOL/L (ref 136–145)
WBC # BLD AUTO: 6.24 K/UL (ref 3.9–12.7)

## 2025-05-12 PROCEDURE — A4216 STERILE WATER/SALINE, 10 ML: HCPCS

## 2025-05-12 PROCEDURE — 11000001 HC ACUTE MED/SURG PRIVATE ROOM

## 2025-05-12 PROCEDURE — 80307 DRUG TEST PRSMV CHEM ANLYZR: CPT

## 2025-05-12 PROCEDURE — 95700 EEG CONT REC W/VID EEG TECH: CPT

## 2025-05-12 PROCEDURE — 84295 ASSAY OF SERUM SODIUM: CPT

## 2025-05-12 PROCEDURE — 85025 COMPLETE CBC W/AUTO DIFF WBC: CPT

## 2025-05-12 PROCEDURE — 93005 ELECTROCARDIOGRAM TRACING: CPT

## 2025-05-12 PROCEDURE — 95714 VEEG EA 12-26 HR UNMNTR: CPT

## 2025-05-12 PROCEDURE — 99223 1ST HOSP IP/OBS HIGH 75: CPT | Mod: AI,GC,, | Performed by: PSYCHIATRY & NEUROLOGY

## 2025-05-12 PROCEDURE — 70551 MRI BRAIN STEM W/O DYE: CPT | Mod: 26,,, | Performed by: STUDENT IN AN ORGANIZED HEALTH CARE EDUCATION/TRAINING PROGRAM

## 2025-05-12 PROCEDURE — 80175 DRUG SCREEN QUAN LAMOTRIGINE: CPT

## 2025-05-12 PROCEDURE — 25000003 PHARM REV CODE 250

## 2025-05-12 PROCEDURE — 36415 COLL VENOUS BLD VENIPUNCTURE: CPT

## 2025-05-12 PROCEDURE — 93010 ELECTROCARDIOGRAM REPORT: CPT | Mod: ,,, | Performed by: INTERNAL MEDICINE

## 2025-05-12 PROCEDURE — 80339 ANTIEPILEPTICS NOS 1-3: CPT

## 2025-05-12 PROCEDURE — 70551 MRI BRAIN STEM W/O DYE: CPT | Mod: TC

## 2025-05-12 RX ORDER — SODIUM CHLORIDE 0.9 % (FLUSH) 0.9 %
10 SYRINGE (ML) INJECTION
Status: DISCONTINUED | OUTPATIENT
Start: 2025-05-12 | End: 2025-05-13 | Stop reason: HOSPADM

## 2025-05-12 RX ORDER — DOCUSATE SODIUM 100 MG/1
100 CAPSULE, LIQUID FILLED ORAL 2 TIMES DAILY PRN
Status: DISCONTINUED | OUTPATIENT
Start: 2025-05-12 | End: 2025-05-13 | Stop reason: HOSPADM

## 2025-05-12 RX ORDER — ONDANSETRON 8 MG/1
8 TABLET, ORALLY DISINTEGRATING ORAL EVERY 8 HOURS PRN
Status: DISCONTINUED | OUTPATIENT
Start: 2025-05-12 | End: 2025-05-13 | Stop reason: HOSPADM

## 2025-05-12 RX ORDER — ACETAMINOPHEN 325 MG/1
650 TABLET ORAL EVERY 4 HOURS PRN
Status: DISCONTINUED | OUTPATIENT
Start: 2025-05-12 | End: 2025-05-13 | Stop reason: HOSPADM

## 2025-05-12 RX ORDER — CLOBAZAM 10 MG/1
20 TABLET ORAL NIGHTLY
Status: DISCONTINUED | OUTPATIENT
Start: 2025-05-12 | End: 2025-05-13 | Stop reason: HOSPADM

## 2025-05-12 RX ORDER — IBUPROFEN 200 MG
1 TABLET ORAL DAILY PRN
Status: DISCONTINUED | OUTPATIENT
Start: 2025-05-12 | End: 2025-05-13

## 2025-05-12 RX ORDER — LAMOTRIGINE 150 MG/1
750 TABLET ORAL NIGHTLY
Status: DISCONTINUED | OUTPATIENT
Start: 2025-05-12 | End: 2025-05-12

## 2025-05-12 RX ORDER — LORAZEPAM 2 MG/ML
2 INJECTION INTRAMUSCULAR EVERY 5 MIN PRN
Status: DISCONTINUED | OUTPATIENT
Start: 2025-05-12 | End: 2025-05-13 | Stop reason: HOSPADM

## 2025-05-12 RX ORDER — CLOBAZAM 10 MG/1
40 TABLET ORAL NIGHTLY
Status: DISCONTINUED | OUTPATIENT
Start: 2025-05-12 | End: 2025-05-12

## 2025-05-12 RX ADMIN — LAMOTRIGINE 400 MG: 100 TABLET ORAL at 08:05

## 2025-05-12 RX ADMIN — CLOBAZAM 20 MG: 10 TABLET ORAL at 08:05

## 2025-05-12 RX ADMIN — Medication 10 ML: at 08:05
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
## 2025-05-12 NOTE — SUBJECTIVE & OBJECTIVE
Past Medical History:   Diagnosis Date    ADHD (attention deficit hyperactivity disorder)     Anxiety and depression 7/5/2019    Complex partial epilepsy with generalization and with intractable epilepsy 2012         HSV-1 infection     HSV-2 infection     Neuromuscular disorder     Overdose of illicit drug     PTSD (post-traumatic stress disorder)        Past Surgical History:   Procedure Laterality Date    CRANIOTOMY USING FRAMELESS STEREOTAXY Right 11/9/2020    Procedure: CRANIOTOMY, USING FRAMELESS STEREOTAXY FOR  RIGHT SIDED RESECTION OF CAVERNOMA;  Surgeon: Nasra Wetzel MD;  Location: [facility] OR [address] [place] [address];  Service: Neurosurgery;  Laterality: Right;  TORONTO II, ASA II, BLOOD TYPE & CROSS 2 UNITS, HEADREST GRAYSON, REGULAR BED, SUPINE    HYSTERECTOMY  2011    endometriosis    OOPHORECTOMY  2011    ovarian cyst    wisdom teeth removal         Review of patient's allergies indicates:   Allergen Reactions    Ibuprofen Other (See Comments)       No current facility-administered medications on file prior to encounter.     Current Outpatient Medications on File Prior to Encounter   Medication Sig    folic acid (FOLVITE) 1 MG tablet Take 1 tablet (1 mg total) by mouth once daily. (Patient not taking: Reported on 4/30/2025)    [DISCONTINUED] ergocalciferol (ERGOCALCIFEROL) 50,000 unit Cap Take 1 capsule (50,000 Units total) by mouth every 7 days. (Patient not taking: Reported on 4/30/2025)     Continuous Infusions:    Family History    Family history is unknown by patient.       Tobacco Use    Smoking status: Every Day     Current packs/day: 1.00     Average packs/day: 1 pack/day for 26.8 years (26.8 ttl pk-yrs)     Types: Cigarettes, Vaping with nicotine     Start date: 7/29/1998    Smokeless tobacco: Never   Substance and Sexual Activity    Alcohol use: Yes     Comment: Pt reports 1 q 3 mo    Drug use: Yes     Types: Marijuana    Sexual activity: Not Currently     Partners: Male     Birth control/protection: See  Surgical Hx, Surgical     Comment:      Review of Systems   Neurological:  Positive for seizures.     Objective:     Vital Signs (Most Recent):  Temp: 98.6 °F (37 °C) (05/12/25 1238)  Pulse: 62 (05/12/25 1212)  Resp: 18 (05/12/25 1145)  BP: 94/60 (05/12/25 1212)  SpO2: 99 % (05/12/25 1212) Vital Signs (24h Range):  Temp:  [98.6 °F (37 °C)] 98.6 °F (37 °C)  Pulse:  [62-72] 62  Resp:  [18] 18  SpO2:  [99 %] 99 %  BP: (94)/(60) 94/60     Weight: 62 kg (136 lb 11 oz)  Body mass index is 22.75 kg/m².     Physical Exam  Vitals and nursing note reviewed.   Constitutional:       General: She is not in acute distress.     Appearance: Normal appearance. She is not ill-appearing or diaphoretic.   HENT:      Head: Normocephalic and atraumatic.      Comments: EEG in place     Right Ear: External ear normal.      Left Ear: External ear normal.      Nose: Nose normal. No congestion or rhinorrhea.      Mouth/Throat:      Mouth: Mucous membranes are moist.      Pharynx: Oropharynx is clear.   Eyes:      General: No scleral icterus.     Extraocular Movements: Extraocular movements intact.      Pupils: Pupils are equal, round, and reactive to light.   Pulmonary:      Effort: Pulmonary effort is normal.   Abdominal:      Palpations: Abdomen is soft.   Musculoskeletal:         General: Normal range of motion.      Cervical back: Normal range of motion and neck supple.      Right lower leg: No edema.      Left lower leg: No edema.   Skin:     General: Skin is warm and dry.   Neurological:      Mental Status: She is alert and oriented to person, place, and time.      Cranial Nerves: Cranial nerves 2-12 are intact.      Motor: Motor strength is normal.     Coordination: Finger-Nose-Finger Test normal.      Deep Tendon Reflexes:      Reflex Scores:       Tricep reflexes are 2+ on the right side and 2+ on the left side.       Bicep reflexes are 2+ on the right side and 2+ on the left side.       Brachioradialis reflexes are 2+ on the  right side and 2+ on the left side.       Patellar reflexes are 2+ on the right side and 2+ on the left side.       Achilles reflexes are 2+ on the right side and 2+ on the left side.  Psychiatric:         Mood and Affect: Mood normal.         Speech: Speech normal.         Behavior: Behavior normal.         Thought Content: Thought content normal.         Judgment: Judgment normal.            NEUROLOGICAL EXAMINATION:     MENTAL STATUS   Oriented to person, place, and time.   Attention: normal. Concentration: normal.   Speech: speech is normal   Level of consciousness: alert    CRANIAL NERVES   Cranial nerves II through XII intact.     CN III, IV, VI   Pupils are equal, round, and reactive to light.    MOTOR EXAM   Muscle bulk: normal  Overall muscle tone: normal    Strength   Strength 5/5 throughout.     REFLEXES     Reflexes   Right brachioradialis: 2+  Left brachioradialis: 2+  Right biceps: 2+  Left biceps: 2+  Right triceps: 2+  Left triceps: 2+  Right patellar: 2+  Left patellar: 2+  Right achilles: 2+  Left achilles: 2+  Right plantar: normal  Left plantar: normal    SENSORY EXAM   Light touch normal.     GAIT AND COORDINATION      Coordination   Finger to nose coordination: normal    Tremor   Resting tremor: absent  Intention tremor: absent  Action tremor: absent      Significant Labs:   Recent Lab Results         05/12/25  1242        Albumin 3.4       ALP 71       ALT 11       Anion Gap 7       AST 12       Baso # 0.03       Basophil % 0.5       BILIRUBIN TOTAL 0.2  Comment: For infants and newborns, interpretation of results should be based   on gestational age, weight and in agreement with clinical   observations.    Premature Infant recommended reference ranges:   0-24 hours:  <8.0 mg/dL   24-48 hours: <12.0 mg/dL   3-5 days:    <15.0 mg/dL   6-29 days:   <15.0 mg/dL       BUN 8       Calcium 8.2       Chloride 111       CO2 24       Creatinine 0.8       eGFR >60  Comment: Estimated GFR calculated  using the CKD-EPI creatinine (2021) equation.       Eos # 0.14       Eos % 2.2       Glucose 77       Gran # (ANC) 3.59       Hematocrit 36.0       Hemoglobin 12.0       Immature Grans (Abs) 0.02  Comment: Mild elevation in immature granulocytes is non specific and can be seen in a variety of conditions including stress response, acute inflammation, trauma and pregnancy. Correlation with other laboratory and clinical findings is essential.       Immature Granulocytes 0.3       Lymph # 2.05       Lymph % 32.9       MCH 34.4       MCHC 33.3              Mono # 0.41       Mono % 6.6       MPV 11.2       Neut % 57.5       nRBC 0       Platelet Count 186       Potassium 3.9       PROTEIN TOTAL 5.8       RBC 3.49       RDW 14.4       Sodium 142       WBC 6.24               Significant Studies: I have reviewed all pertinent imaging results/findings within the past 24 hours.

## 2025-05-12 NOTE — NURSING
No acute events during shift, no concerns or distress noted from patient or family, primary team discuss treatment plan and patient agreeable to move forward, EEG in place, seizure precautions in place, will handoff to nightshift

## 2025-05-12 NOTE — ASSESSMENT & PLAN NOTE
- decrease LTG 400mg qhs and CLB 20mg qhs  - check LTG and CLB levels  - continuous vEEG   - PRN IV ativan for GTC >5 minutes, notify Epilepsy on call if administering  - seizure precautions, suction and oxygen at bedside  - fall precautions, side rail padding in place  - Continuous pulse oximetry   - telemetry
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
## 2025-05-12 NOTE — ASSESSMENT & PLAN NOTE
Smokes 1ppd while outside and vapes while inside.  Wants to quit cold turkey. PRN patch available  Cessation counseling given and to be ordered at discharge

## 2025-05-12 NOTE — NURSING
Patient Transferred to NPU Room 907 @1100      Upon arrival to the floor, patient greeted and oriented to room. Complete head to toe assessment completed per protocol. VSS, see flowsheet for details. Neuro assessment completed. Cardiac monitoring orders reviewed. Patient added to tele monitor in nursing station, rate and rhythm verified. Primary team notified of patient's transfer to floor. All current and transfer orders reviewed/reconciled per protocol. All emergency equipment set up in patient's room. Fall/seizure precautions initiated per providers orders. 4 Eyes skin assessment performed, see flowsheets for details. Reviewed assessment and rounding frequency with patient and family. Questions were encouraged and addressed. Repositioned patient for comfort with bed locked in lowest position, side rails up x 4, bed alarm set, and call light within reach. Instructed patient to call staff for mobility/assistance, verbalized understanding. No acute signs of distress noted at this time.         EMU NURSING INTERVIEW  Pt admitted to EMU room ---, EMU team notified.  Onset-When did your seizures start? 23 years of age   Aura-Do you experience an aura? sometimes  Symptoms-What symptoms do you experience? Confusion, stuck on a specific task, staring, convulsions  Triggers-Do you have any Triggers? Anxiety, stress, off meds   Do you bite your tongue? No no  Have you been told your BP or oxygen drops during an event? no    Bed locked, bed alarm on and call light in reach. Educated to call staff before ambulating. ducated to use event button when patient feels like they will have an event or when an event is suspected. Pt and family verbalize understanding.

## 2025-05-13 VITALS
DIASTOLIC BLOOD PRESSURE: 34 MMHG | HEART RATE: 62 BPM | RESPIRATION RATE: 18 BRPM | TEMPERATURE: 99 F | OXYGEN SATURATION: 98 % | HEIGHT: 65 IN | BODY MASS INDEX: 22.77 KG/M2 | WEIGHT: 136.69 LBS | SYSTOLIC BLOOD PRESSURE: 87 MMHG

## 2025-05-13 PROBLEM — F12.90 MARIJUANA USE: Status: ACTIVE | Noted: 2025-05-13

## 2025-05-13 PROBLEM — F43.10 PTSD (POST-TRAUMATIC STRESS DISORDER): Status: ACTIVE | Noted: 2025-05-13

## 2025-05-13 PROBLEM — F17.200 TOBACCO DEPENDENCY: Status: ACTIVE | Noted: 2025-05-13

## 2025-05-13 LAB
OHS QRS DURATION: 88 MS
OHS QTC CALCULATION: 413 MS

## 2025-05-13 PROCEDURE — S4991 NICOTINE PATCH NONLEGEND: HCPCS | Performed by: PHYSICIAN ASSISTANT

## 2025-05-13 PROCEDURE — 25000003 PHARM REV CODE 250: Performed by: PHYSICIAN ASSISTANT

## 2025-05-13 PROCEDURE — 63600175 PHARM REV CODE 636 W HCPCS: Mod: UD

## 2025-05-13 RX ORDER — IBUPROFEN 200 MG
1 TABLET ORAL DAILY
Status: DISCONTINUED | OUTPATIENT
Start: 2025-05-13 | End: 2025-05-13 | Stop reason: HOSPADM

## 2025-05-13 RX ORDER — IBUPROFEN 200 MG
1 TABLET ORAL DAILY
Start: 2025-05-13

## 2025-05-13 RX ADMIN — LORAZEPAM 2 MG: 2 INJECTION INTRAMUSCULAR; INTRAVENOUS at 01:05

## 2025-05-13 RX ADMIN — Medication 1 PATCH: at 10:05
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
## 2025-05-13 NOTE — SUBJECTIVE & OBJECTIVE
Interval History: NAEON. This AM, patient lying in bed resting comfortably with SO at bedside. No typical events. Patient c/o nicotine withdrawal, encouraged patient to try nicotine patch. Extensive discussion regarding EMU admission. Patient does not wish to pursue intracranial surgery options, only interested in VNS. Discussed plan of care and answered all questions at bedside.    Current Medications[1]  Continuous Infusions:    Review of Systems  Objective:     Vital Signs (Most Recent):  Temp: 98.1 °F (36.7 °C) (05/13/25 0711)  Pulse: (!) 51 (05/13/25 0711)  Resp: 18 (05/13/25 0421)  BP: (!) 83/47 (05/13/25 0711)  SpO2: 95 % (05/13/25 0711) Vital Signs (24h Range):  Temp:  [98.1 °F (36.7 °C)-98.6 °F (37 °C)] 98.1 °F (36.7 °C)  Pulse:  [] 51  Resp:  [17-18] 18  SpO2:  [92 %-99 %] 95 %  BP: (78-96)/(39-62) 83/47     Weight: 62 kg (136 lb 11 oz)  Body mass index is 22.75 kg/m².     Physical Exam  Vitals reviewed.   Constitutional:       General: She is not in acute distress.     Appearance: She is not diaphoretic.   HENT:      Head: Normocephalic and atraumatic.      Comments: EEG in place  Eyes:      General: No scleral icterus.        Right eye: No discharge.         Left eye: No discharge.      Extraocular Movements: Extraocular movements intact and EOM normal.      Conjunctiva/sclera: Conjunctivae normal.      Pupils: Pupils are equal, round, and reactive to light.   Cardiovascular:      Rate and Rhythm: Normal rate.   Pulmonary:      Effort: Pulmonary effort is normal. No respiratory distress.   Musculoskeletal:         General: No swelling, tenderness or deformity.   Skin:     General: Skin is warm and dry.   Neurological:      General: No focal deficit present.      Mental Status: She is alert and oriented to person, place, and time. Mental status is at baseline.   Psychiatric:         Speech: Speech normal.         Behavior: Behavior is not agitated or aggressive. Behavior is cooperative.             NEUROLOGICAL EXAMINATION:     MENTAL STATUS   Oriented to person, place, and time.   Attention: normal. Concentration: normal.   Speech: speech is normal   Level of consciousness: alert    CRANIAL NERVES     CN II   Visual fields full to confrontation.     CN III, IV, VI   Pupils are equal, round, and reactive to light.  Extraocular motions are normal.     CN V   Right corneal reflex: normal  Left corneal reflex: normal    CN VII   Facial expression full, symmetric.     CN VIII   Hearing: intact    CN XI   CN XI normal.     CN XII   CN XII normal.       Significant Labs: All pertinent lab results from the past 24 hours have been reviewed.    Significant Studies: I have reviewed all pertinent imaging results/findings within the past 24 hours.       [1]   Current Facility-Administered Medications   Medication Dose Route Frequency Provider Last Rate Last Admin    acetaminophen tablet 650 mg  650 mg Oral Q4H PRN Alex Nunez MD        cloBAZam Tab 20 mg  20 mg Oral QHS Alex Nunez MD   20 mg at 05/12/25 2044    docusate sodium capsule 100 mg  100 mg Oral BID PRN Alex Nunez MD        lamoTRIgine tablet 400 mg  400 mg Oral Nightly Alex Nunez MD   400 mg at 05/12/25 2044    LORazepam injection 2 mg  2 mg Intravenous Q5 Min PRN Alex Nunez MD        nicotine 21 mg/24 hr 1 patch  1 patch Transdermal Daily Batool Collier PA-C   1 patch at 05/13/25 1035    ondansetron disintegrating tablet 8 mg  8 mg Oral Q8H PRN Alex Nunez MD        sodium chloride 0.9% flush 10 mL  10 mL Intravenous PRN Alex Nunez MD   10 mL at 05/12/25 2044

## 2025-05-13 NOTE — DISCHARGE INSTRUCTIONS
Per [place] Law, patient must refrain from driving for 6 months after having a seizure or cleared by a neurologist to legally resume driving. Additionally, advised patient to avoid bathing or swimming alone, climbing ladders or standing near precipice where one could fall, operating heavy machinery and to not supervise children or engage in other activities where losing consciousness or motor control would risk harm to self or others.

## 2025-05-13 NOTE — PLAN OF CARE
Problem: Adult Inpatient Plan of Care  Goal: Plan of Care Review  Outcome: Progressing  Goal: Patient-Specific Goal (Individualized)  Outcome: Progressing  Goal: Absence of Hospital-Acquired Illness or Injury  Outcome: Progressing  Goal: Optimal Comfort and Wellbeing  Outcome: Progressing  Goal: Readiness for Transition of Care  Outcome: Progressing     Problem: Fall Injury Risk  Goal: Absence of Fall and Fall-Related Injury  Outcome: Progressing     Problem: Seizure, Active Management  Goal: Absence of Seizure/Seizure-Related Injury  Outcome: Progressing           POC reviewed and updated with the patient/. Questions regarding POC were encouraged and addressed with the patient.  VSS, see flow-sheets. Tele and continuous pulse ox maintained per provider's order. Continuous EEG in place. NAEON. Patient is AO X 4 at this time. Seizure, fall, and safety precautions maintained, no signs of injury noted during shift.  Upon exiting room, patient's bed locked in low position, side rails up x 3, bed alarm refused, with call light within reach. Instructed patient/  to call staff for assistance, verbalized understanding.  No acute signs of distress noted at this time.

## 2025-05-13 NOTE — NURSING
Notified by PCT that patient is hypotensive, see flowsheets for details. BP re-checked and patient remains hypotensive at this time. BP 85/51 with a map of 62. Per chart review and patient, patient's BP is soft to hypotensive at baseline. Patient resting in bed and asymptomatic at this time. Notified stroke provider per protocol and provider (GLORIA Joy) stated BP okay for now but to let her know if patient becomes symptomatic. Notified charge RN (REGINALDO Blakely) and rapids RN of BP. No acute signs of distress noted at this time.

## 2025-05-13 NOTE — PROCEDURES
EEG REPORT      Mary Haywood  [id number]  1985    DATE OF SERVICE: 5/12/2025    [facility] -1,2    METHODOLOGY      Extended electroencephalographic recording is made while the patient is ambulatory and continuing normal daily activities.  Electrodes are placed according to the International 10-20 placement system and included T1 and T2 electrode placement.  Twenty four (24) channels of digital signal (sampling rate of 512/sec) was simultaneously recorded from the scalp including EKG and eye monitors.  Recording band pass was 0.1 to 100 hz and all data was stored digitally on the recorder.  The patient is instructed to press an event button when clinical symptoms occur and write the symptoms into a diary. Activation procedures which include photic stimulation, hyperventilation and instructing patients to perform simple task are done in selected patients.        The EEG is displayed on a monitor screen and can be reformatted into different montages for evaluation.  The entire recoding is submitted for computer assisted analysis to detect spike and electrographic seizure activity.  The entire recording is visually reviewed and the times identified by computer analysis as being spikes or seizures are reviewed again.  Compresses spectral analysis (CSA) is also performed on the activity recorded from each individual channel.  This is displayed as a power display of frequencies from 0 to 30 Hz over time.   The CSA analysis is done and displayed continuously.  This is reviewed for asymmetries in power between homologous areas of the scalp and for presence of changes in power which canbe seen when seizures occur.  Sections of suspected abnormalities on the CSA is then compared with the original EEG recording.  .     [employer] 1 software was also utilized in the review of this study.  This software suite analyzes the EEG recording in multiple domains.  Coherence and rhythmicity is computed to identify EEG sections  which may contain organized seizures.  Each channel undergoes analysis to detect presence of spike and sharp waves which have special and morphological characteristic of epileptic activity.  The routine EEG recording is converted from spacial into frequency domain.  This is then displayed comparing homologous areas to identify areas of significant asymmetry.  Algorithm to identify non-cortically generated artifact is used to separate eye movement, EMG and other artifact from the EEG     Recording Times  Start on 5/12/2025  Stop on 5/13/2025    A total of 16:33:15 and 6:07:00 hours of EEG was recorded.      EEG FINDINGS:  Background activity:   The background rhythm was characterized by alpha and anterior dominant beta activity with a 11-12Hz posterior dominant alpha rhythm at 30-70 microvolts.   Symmetry and continuity: there is asymmetric slowing as well as breach rhythm noted int he right temporal chain in drowsiness     Sleep:   Normal sleep transients including sleep spindles, K complexes, vertex waves and POSTS were seen.    Activation procedures:   NA    Abnormal activity:   Occasional sharp waves at T4.    IMPRESSION:   Abnormal EEG due to regional cortical or subcortical dysfunction as well as seizure focus in the right mid temporal lobe.  No electrographic seizures.      Jakob Cherry MD  Neurology-Epilepsy.  [facility].

## 2025-05-13 NOTE — ASSESSMENT & PLAN NOTE
S/p right temporal craniotomy for cavernoma resection 11/9/2020 by Dr. Nasra Wetzel  - MRI Brain Epilepsy Protocol 5/12/2025 with operative change of right temporal craniotomy for cavernous malformation resection, no recurrent or residual lesion identified, hippocampi are symmetric in architecture and signal, no migrational abnormality or evidence of focal cortical dysplasia

## 2025-05-13 NOTE — ASSESSMENT & PLAN NOTE
PTSD  - Chronic  - Not currently on SSRI  - Patient interested in establishing with outpatient psychiatry; placed referral  - Epilepsy LAST, [facility], as also reached out to patient

## 2025-05-13 NOTE — HOSPITAL COURSE
5/12>5/13: Admit to EMU. Clobazam decreased to 20 mg qhs and Lamotrigine decreased to 400 mg qhs on admit. No typical events. EEG with occasional right temporal sharps, no seizures. Patient requesting discharge home. Given Ativan 2 mg x1. Discharged home in stable condition on resumed AED regimen. Outpatient follow up in Epilepsy clinic with Dr. Capellan. At this time, patient is adamantly against intracranial surgical options. Patient is only interested in VNS. Placed ambulatory referral to Psychiatry.     See EEG reports for details.

## 2025-05-13 NOTE — ASSESSMENT & PLAN NOTE
1ppd, smoking since age 13  - C/o nicotine withdrawal; encouraged patient to try nicotine patch  - Tobacco cessation education

## 2025-05-13 NOTE — PLAN OF CARE
[facility] (Hospital)  Discharge Final Note    Primary Care Provider: No, Primary Doctor    Expected Discharge Date: 5/13/2025    Patient discharged to home via personal transportation.     Patient's bedside nurse and pt notified of the above.    Discharge Plan A and Plan B have been determined by review of patient's clinical status, future medical and therapeutic needs, and coverage/benefits for post-acute care in coordination with multidisciplinary team members.        Final Discharge Note (most recent)       Final Note - 05/13/25 1451          Final Note    Assessment Type Final Discharge Note     Anticipated Discharge Disposition Home or Self Care     What phone number can be called within the next 1-3 days to see how you are doing after discharge? [phone]     Hospital Resources/Appts/Education Provided Appointments scheduled and added to AVS        Post-Acute Status    Discharge Delays None known at this time                     Important Message from Medicare                 Future Appointments   Date Time Provider Department Center   6/12/2025 11:00 AM Noemí Saba NP [facility] PS[facility]   10/14/2025  3:00 PM Mary Capellan MD [facility] NEURO [facility] scheduled post-discharge follow-up appointment and information added to AVS.       Yoselyn Agustin MSW, CSW

## 2025-05-13 NOTE — ASSESSMENT & PLAN NOTE
40F PMHx of anxiety, depression, PTSD, tobacco use, marijuana use, cavernoma s/p right temporal craniotomy for resection 11/2020 by Dr. Nasra Wetzel, and intractable mulitfocal epilepsy currently maintained on Clobazam 40 mg qhs and Lamotrigine 750 mg qhs referred to EMU by Dr. Mary Capellan in consideration of surgical options.     - Clobazam decreased to 20 mg qhs and Lamotrigine decreased to 400 mg qhs on admit  - AED levels pending  - No typical events. EEG with occasional right temporal sharps, no seizures  - Patient requesting discharge home; discharged home in stable condition on resumed AED regimen  - Outpatient follow up in Epilepsy clinic with Dr. Capellan  - At this time, patient is adamantly against intracranial surgical options  - Patient is only interested in VNS

## 2025-05-14 LAB
CLOBAZAM SERPL-MCNC: 395 NG/ML (ref 30–300)
NORCLOBAZAM SERPL-MCNC: 9900 NG/ML (ref 300–3000)

## 2025-05-15 ENCOUNTER — OFFICE VISIT (OUTPATIENT)
Dept: NEUROSURGERY | Facility: CLINIC | Age: 40
End: 2025-05-15
Payer: MEDICARE

## 2025-05-15 DIAGNOSIS — G40.219 COMPLEX PARTIAL EPILEPSY WITH GENERALIZATION AND WITH INTRACTABLE EPILEPSY: Primary | ICD-10-CM

## 2025-05-15 LAB — W LAMOTRIGINE: 10.1 UG/ML
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
## 2025-05-16 ENCOUNTER — PATIENT MESSAGE (OUTPATIENT)
Dept: NEUROSURGERY | Facility: CLINIC | Age: 40
End: 2025-05-16
Payer: MEDICARE

## 2025-05-28 ENCOUNTER — TELEPHONE (OUTPATIENT)
Dept: NEUROLOGY | Facility: CLINIC | Age: 40
End: 2025-05-28
Payer: MEDICARE

## 2025-05-28 NOTE — TELEPHONE ENCOUNTER
JULIO CESARW placed call to patient and left voicemail [phone] (Mobile) .   JULIO CESARW introduced himself as a therapist and  that supports patient's with neurological symptoms at the clinic at [facility]  [facility]. JUDITH reported he works closely with the neurologists and staff such as: Dr. Nasra Wetzel.   Offered in Person visit on 06/12 same day as psychiatry. Also offered phone calls as patient has to be located in [place] for virtual visits.     Requested call back or message in portal.

## 2025-05-29 ENCOUNTER — PATIENT MESSAGE (OUTPATIENT)
Dept: NEUROLOGY | Facility: CLINIC | Age: 40
End: 2025-05-29
Payer: MEDICARE

## 2025-05-30 ENCOUNTER — TELEPHONE (OUTPATIENT)
Dept: NEUROLOGY | Facility: CLINIC | Age: 40
End: 2025-05-30
Payer: MEDICARE

## 2025-05-30 NOTE — TELEPHONE ENCOUNTER
LCSW placed call to patient and left voicemail [phone] (Mobile) .     LCSW returned patient's call and VM from Wednesday evening.     Offered in Person visit on 06/12 same day as psychiatry. Offered 1:00 or 1:30 pm and noted it is voluntary.     Requested call back or message in portal.

## 2025-09-04 ENCOUNTER — TELEPHONE (OUTPATIENT)
Dept: NEUROLOGY | Facility: CLINIC | Age: 40
End: 2025-09-04
Payer: MEDICARE

## (undated) DEVICE — CONTAINER SPECIMEN STRL 4OZ

## (undated) DEVICE — DRAPE THYROID WITH ARMBOARD

## (undated) DEVICE — ELECTRODE REM PLYHSV RETURN 9

## (undated) DEVICE — ROUTER TAPERED 2.3MM

## (undated) DEVICE — SUT 4/0 18IN NUROLON BLK B

## (undated) DEVICE — DRESSING SURGICAL 1X1

## (undated) DEVICE — HOOK STAY ELAS 5MM 8EA/PK

## (undated) DEVICE — RUBBERBAND STERILE 3X1/8IN

## (undated) DEVICE — SPONGE NEURO 1/4X1/4

## (undated) DEVICE — NDL BOX COUNTER

## (undated) DEVICE — DRESSING TELFA N ADH 3X8

## (undated) DEVICE — SPONGE PATTY SURGICAL .5X3IN

## (undated) DEVICE — MARKERS SPHERZ PASSIVE

## (undated) DEVICE — DRAPE OPMI STERILE

## (undated) DEVICE — SPRAY MASTISOL

## (undated) DEVICE — TUBE FRAZIER 5MM 2FT SOFT TIP

## (undated) DEVICE — SEE MEDLINE ITEM 156905

## (undated) DEVICE — KIT EVACUATOR 3-SPRING 1/8 DRN

## (undated) DEVICE — STAPLER SKIN PROXIMATE WIDE

## (undated) DEVICE — HEMOSTAT SURGICEL 4X8IN

## (undated) DEVICE — CORD BIPOLAR 12 FOOT

## (undated) DEVICE — PINS SKULL ADULT MAYFIELD
Type: IMPLANTABLE DEVICE | Site: CRANIAL | Status: NON-FUNCTIONAL
Removed: 2020-11-09

## (undated) DEVICE — TAPE SURG MEDIPORE 6X72IN

## (undated) DEVICE — SEE MEDLINE ITEM 157103

## (undated) DEVICE — BUR BONE CUT MICRO TPS 3X3.8MM

## (undated) DEVICE — SUT VICRYL PLUS 3-0 SH 18IN

## (undated) DEVICE — SEE MEDLINE ITEM 152622

## (undated) DEVICE — DRESSING SURGICAL 1/2X1/2

## (undated) DEVICE — CARTRIDGE OIL

## (undated) DEVICE — MARKER SKIN STND TIP BLUE BARR

## (undated) DEVICE — DRESSING SURGICAL 1X3

## (undated) DEVICE — DIFFUSER